# Patient Record
Sex: FEMALE | Race: WHITE | NOT HISPANIC OR LATINO | Employment: STUDENT | URBAN - METROPOLITAN AREA
[De-identification: names, ages, dates, MRNs, and addresses within clinical notes are randomized per-mention and may not be internally consistent; named-entity substitution may affect disease eponyms.]

---
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
## 2018-01-03 ENCOUNTER — OFFICE VISIT (OUTPATIENT)
Dept: GASTROENTEROLOGY | Facility: CLINIC | Age: 18
End: 2018-01-03
Payer: COMMERCIAL

## 2018-01-03 VITALS
SYSTOLIC BLOOD PRESSURE: 126 MMHG | DIASTOLIC BLOOD PRESSURE: 85 MMHG | HEART RATE: 73 BPM | WEIGHT: 157.63 LBS | BODY MASS INDEX: 26.26 KG/M2 | HEIGHT: 65 IN

## 2018-01-03 DIAGNOSIS — K52.9 PROCTOCOLITIS WITHOUT COMPLICATION: Primary | ICD-10-CM

## 2018-01-03 PROCEDURE — 99214 OFFICE O/P EST MOD 30 MIN: CPT | Mod: S$GLB,,, | Performed by: INTERNAL MEDICINE

## 2018-01-03 PROCEDURE — 99999 PR PBB SHADOW E&M-EST. PATIENT-LVL III: CPT | Mod: PBBFAC,,, | Performed by: INTERNAL MEDICINE

## 2018-01-03 NOTE — PATIENT INSTRUCTIONS
Ulcerative Colitis  You have been diagnosed with ulcerative colitis. Ulcerative colitis is a chronic condition that causes inflammation and ulcers in the rectum and colon. It is a form of inflammatory bowel disease (IBD). The disease is usually diagnosed by a special procedure called a colonoscopy. The symptoms usually develop over time. There is no medicine that can cure ulcerative colitis. The goal of treatment is to reduce the symptoms, and cause a remission.  Symptoms of ulcerative colitis include:  · Abdominal cramps and pain  · Diarrhea, usually bloody  · Rectal bleeding  · Rectal pain  · Fever  · Decreased appetite and weight loss  · Low energy  · Inflammation outside of the colon can occur and can cause pain or swelling in places like the eyes, skin, and joints  Home care  No one knows what exactly causes IBD. The goal is to control and relieve the symptoms, and prevent complications, so you can lead a full and active life. No medicine can cure the disease, but in some cases, surgery to remove the whole colon can be curative. However, surgery causes other side effects and so medicines are often preferred. Discuss your options with your healthcare provider.  Diet  Your diet did not cause your condition, but it can affect it. Unfortunately, no one diet that works for everyone, so you have to experiment. Below are some recommendations, but what works for you may be different. Keep a food log to figure out what you are sensitive to.  · Eat more slowly. Eat smaller amounts at a time, but more often. Remember, you can always eat more, but can't eat less once you've eaten too much.  · High-fiber foods are complicated. While they may help constipation, they can make bloating, cramping, gas, and diarrhea worse.  · Eat less sugar.  · Try avoiding dairy products if you feel you are sensitive to lactose.  · Try cutting out foods that are high in fat and fatty meats.  · You can control bloating and passing excess gas.  "Be careful with "gassy" vegetables and fruits like beans, cabbage, broccoli, and cauliflower.  · Be careful of carbonated beverages and fruit juices. They can make bloating and diarrhea worse.  · Caffeine, alcohol, and stimulants may make symptoms worse.  Lifestyle  Although stress doesn't cause IBD, it is a factor in flare-ups, and how you feel and react to your condition.  · Look for things that seem to make your symptoms worse, such as stress and emotions.  · Counseling can help you deal with stress. So can self-help measure like exercise, yoga, and meditation.  · Depression can be a part of this illness and antidepressant medicine may be prescribed. This may actually help with diarrhea, constipation, and cramping, as well as symptoms of depression.  · Smoking can make symptoms worse.  · Lack of sleep can make symptoms seem worse.  · Alcohol use can make symptoms worse.  Medicines  Your healthcare provider may prescribe medicines. Take them as directed. In most situations, lifelong medicine is necessary. For acute flares, additional prescription medicines can be prescribed. Call your provider if you need these.  · Ask your healthcare provider before taking any medicines for diarrhea.  · Avoid anti-inflammatory medicines like ibuprofen or naproxen.  · Consider nutritional supplements. This is especially true if the diarrhea is prolonged, or you aren't eating or are losing weight.  Follow-up care  Follow up with your healthcare provider, or as advised. Tell your provider if you lose more than 5 pounds over 3 to 6 months, and you aren't trying to lose weight.  If a stool sample was taken, or cultures were done, you will be told if they are positive, or if your treatment needs to be changed. You can call as directed for results.  If X-rays were done, a radiologist will look at them. You will be told if you need a change in treatment  It is very important to tell your doctor if you intend to get pregnant, or find out " you are pregnant. You will need to discuss your disease, medicines, and plan as early as possible and preferably before you conceive.  Call 911  Call 911 if any of these occur:  · Trouble breathing  · Confusion  · Very drowsy or trouble awakening  · Fainting or loss of consciousness  · Rapid heart rate  · Chest pain  When to seek medical advice  Call your healthcare provider right away if any of these occur:  · Bleeding from your rectum  · Frequent diarrhea or abdominal pain that's not controlled by your medicine  · Bloody diarrhea  · Fever of 100.4ºF (38ºC) or higher, or as directed by your health care provider  · Persistent nausea or repeated vomiting   Date Last Reviewed: 12/30/2015  © 1137-3492 [employer]. [address] [place] [address], [place] [address]. All rights reserved. This information is not intended as a substitute for professional medical care. Always follow your healthcare professional's instructions.

## 2018-01-03 NOTE — PROGRESS NOTES
Subjective:      Patient ID: Elvia Carr is a 17 y.o. female.    Chief Complaint: Ulcerative Colitis    HPI:   17-year-old  returns for GI follow-up.   Prior history of distal ulcerative colitis in April 2017.  Was initially treated with Rowasa in a preserved.  Symptoms have resolved completely.  Is now taking a presumptive only.  I advised her she could take Rowasa on an as needed basis if she has flares.  She has no digestive complaints.  Back in May she had follow-up labs drawn through [facility].  Doesn't do not appear to be available in Epic at this time.  However just prior to her diagnosis CBC was normal.  Advised her that we will provide refills on an as-needed basis.  She is currently in graduate school in [place].        Review of patient's allergies indicates:  No Known Allergies  Past Medical History:   Diagnosis Date    Asthma     Systemic lupus erythematosus     UC (ulcerative colitis) 04/2017     Past Surgical History:   Procedure Laterality Date    COLONOSCOPY N/A 4/17/2017    Procedure: COLONOSCOPY;  Surgeon: Drake Arnold MD;  Location: [facility];  Service: Endoscopy;  Laterality: N/A;    NONE       Family History   Problem Relation Age of Onset    Osteoarthritis Mother     Migraines Mother     Heart failure Maternal Grandfather     Breast cancer Neg Hx     Colon cancer Neg Hx     Ovarian cancer Neg Hx      Social History     Social History    Marital status: Single     Spouse name: N/A    Number of children: 0    Years of education: N/A     Occupational History    Not on file.     Social History Main Topics    Smoking status: Never Smoker    Smokeless tobacco: Never Used    Alcohol use Yes    Drug use: No    Sexual activity: Yes     Partners: Male     Birth control/ protection: None, Implant     Other Topics Concern    Not on file     Social History Narrative    Exercises regularly.  Plays basketball/volleyball.      Diet is fair/good.      Satisfied  "with weight.    She does drink at least 1/2 gallon water daily.    She drinks 0 coffee/tea/caffeine-containing soft drinks daily.    Total sleep time at night is 7 hours.    She does wear seat belts.    Hobbies include none.           Review of Systems:  Constitutional: Negative for appetite change.   HENT: Negative for trouble swallowing.   Eyes: Negative for photophobia.   Respiratory: Negative for cough and shortness of breath.   Cardiovascular: Negative for palpitations.   Gastrointestinal: See HPI for details.  Genitourinary: Negative for frequency and hematuria.   Skin: Negative for rash.   Neurological: Negative for weakness and headaches.   Hematological: Negative.   Psychiatric/Behavioral: Negative for suicidal ideas and behavioral problems.     Objective:     /85 (BP Location: Right arm, Patient Position: Sitting)   Pulse 73   Ht 5' 5" (1.651 m)   Wt 71.5 kg (157 lb 10.1 oz)   BMI 26.23 kg/m²     Physical Exam:  Eyes: Pupils are equal, round, and reactive to light.   Neck: Supple. No mass  Cardiovascular: Regular rhythm . No murmur   Pulmonary/Chest: Lungs clear   Abdominal: Soft. No mass palpated. Nontender, no guarding. Positive bowel sounds   Musculoskeletal: No deformity. No edema.   Psychiatric: Alert and oriented    Assessment:     1. Proctocolitis without complication      Plan:     Elvia was seen today for ulcerative colitis.    Diagnoses and all orders for this visit:    Proctocolitis without complication      Plan:  Continue current medications  Routine follow-up    "

## 2018-03-15 ENCOUNTER — TELEPHONE (OUTPATIENT)
Dept: GASTROENTEROLOGY | Facility: CLINIC | Age: 18
End: 2018-03-15

## 2018-03-15 RX ORDER — MESALAMINE 1.2 G/1
2.4 TABLET, DELAYED RELEASE ORAL ONCE
Qty: 60 TABLET | Refills: 6 | Status: SHIPPED | OUTPATIENT
Start: 2018-03-15 | End: 2018-03-15

## 2018-03-15 NOTE — TELEPHONE ENCOUNTER
We can try Lialda and I have sent this in.  I also have a rx card for apriso if she wants to try that first.  We could mail or she could .     I will send the Lialda and she can see which was is most affordable. ( I do not have any cards for the lialda)

## 2018-03-15 NOTE — TELEPHONE ENCOUNTER
----- Message from Kristine Roblero MA sent at 3/15/2018  3:24 PM CDT -----  Contact: [phone]/self  Can you please call in a different medicine for this patient? Dr. Choi is out until Monday. Thanks  ----- Message -----  From: Claribel Napier  Sent: 3/15/2018   3:19 PM  To: Steph Guy Staff ([place])    Patient states her insurance no longer covers APRISO 0.375 gram Cp24 so she needs an alternative. [address] Please advise.

## 2018-03-15 NOTE — TELEPHONE ENCOUNTER
----- Message from Claribel Napier sent at 3/15/2018  3:19 PM CDT -----  Contact: [phone]/self  Patient states her insurance no longer covers APRISO 0.375 gram Cp24 so she needs an alternative. [address] Please advise.

## 2018-03-15 NOTE — TELEPHONE ENCOUNTER
Spoke with patient in regards to NP message. Patient states that she will come by the office tomorrow to  and savings card.

## 2018-03-19 RX ORDER — ALBUTEROL SULFATE 90 UG/1
2 AEROSOL, METERED RESPIRATORY (INHALATION) EVERY 6 HOURS PRN
Qty: 36 G | Refills: 5 | Status: SHIPPED | OUTPATIENT
Start: 2018-03-19 | End: 2021-04-20

## 2018-06-08 ENCOUNTER — PATIENT MESSAGE (OUTPATIENT)
Dept: OBSTETRICS AND GYNECOLOGY | Facility: CLINIC | Age: 18
End: 2018-06-08

## 2018-06-08 ENCOUNTER — PATIENT MESSAGE (OUTPATIENT)
Dept: FAMILY MEDICINE | Facility: CLINIC | Age: 18
End: 2018-06-08

## 2018-06-15 ENCOUNTER — PATIENT MESSAGE (OUTPATIENT)
Dept: FAMILY MEDICINE | Facility: CLINIC | Age: 18
End: 2018-06-15

## 2018-06-15 ENCOUNTER — LAB VISIT (OUTPATIENT)
Dept: LAB | Facility: HOSPITAL | Age: 18
End: 2018-06-15
Attending: FAMILY MEDICINE
Payer: COMMERCIAL

## 2018-06-15 ENCOUNTER — OFFICE VISIT (OUTPATIENT)
Dept: FAMILY MEDICINE | Facility: CLINIC | Age: 18
End: 2018-06-15
Attending: FAMILY MEDICINE
Payer: COMMERCIAL

## 2018-06-15 VITALS
BODY MASS INDEX: 27.51 KG/M2 | SYSTOLIC BLOOD PRESSURE: 110 MMHG | HEIGHT: 65 IN | DIASTOLIC BLOOD PRESSURE: 80 MMHG | WEIGHT: 165.13 LBS | OXYGEN SATURATION: 97 % | HEART RATE: 80 BPM

## 2018-06-15 DIAGNOSIS — R63.5 WEIGHT GAIN, ABNORMAL: Primary | ICD-10-CM

## 2018-06-15 DIAGNOSIS — R63.5 WEIGHT GAIN, ABNORMAL: ICD-10-CM

## 2018-06-15 DIAGNOSIS — Z30.017 INSERTION OF IMPLANTABLE SUBDERMAL CONTRACEPTIVE: ICD-10-CM

## 2018-06-15 LAB
ALBUMIN SERPL BCP-MCNC: 4.3 G/DL
ALP SERPL-CCNC: 45 U/L
ALT SERPL W/O P-5'-P-CCNC: 17 U/L
ANION GAP SERPL CALC-SCNC: 10 MMOL/L
AST SERPL-CCNC: 21 U/L
BASOPHILS # BLD AUTO: 0.05 K/UL
BASOPHILS NFR BLD: 0.7 %
BILIRUB SERPL-MCNC: 0.6 MG/DL
BUN SERPL-MCNC: 6 MG/DL
CALCIUM SERPL-MCNC: 9.7 MG/DL
CHLORIDE SERPL-SCNC: 110 MMOL/L
CHOLEST SERPL-MCNC: 132 MG/DL
CHOLEST/HDLC SERPL: 2.7 {RATIO}
CO2 SERPL-SCNC: 21 MMOL/L
CREAT SERPL-MCNC: 0.7 MG/DL
DIFFERENTIAL METHOD: ABNORMAL
EOSINOPHIL # BLD AUTO: 0.7 K/UL
EOSINOPHIL NFR BLD: 9.2 %
ERYTHROCYTE [DISTWIDTH] IN BLOOD BY AUTOMATED COUNT: 12.1 %
EST. GFR  (AFRICAN AMERICAN): >60 ML/MIN/1.73 M^2
EST. GFR  (NON AFRICAN AMERICAN): >60 ML/MIN/1.73 M^2
GLUCOSE SERPL-MCNC: 91 MG/DL
HCT VFR BLD AUTO: 39.9 %
HDLC SERPL-MCNC: 49 MG/DL
HDLC SERPL: 37.1 %
HGB BLD-MCNC: 13.3 G/DL
IMM GRANULOCYTES # BLD AUTO: 0.02 K/UL
IMM GRANULOCYTES NFR BLD AUTO: 0.3 %
INSULIN COLLECTION INTERVAL: NORMAL
INSULIN SERPL-ACNC: 13.2 UU/ML
LDLC SERPL CALC-MCNC: 71.2 MG/DL
LYMPHOCYTES # BLD AUTO: 2.6 K/UL
LYMPHOCYTES NFR BLD: 36.3 %
MCH RBC QN AUTO: 29.4 PG
MCHC RBC AUTO-ENTMCNC: 33.3 G/DL
MCV RBC AUTO: 88 FL
MONOCYTES # BLD AUTO: 0.4 K/UL
MONOCYTES NFR BLD: 6.1 %
NEUTROPHILS # BLD AUTO: 3.4 K/UL
NEUTROPHILS NFR BLD: 47.4 %
NONHDLC SERPL-MCNC: 83 MG/DL
NRBC BLD-RTO: 0 /100 WBC
PLATELET # BLD AUTO: 236 K/UL
PMV BLD AUTO: 9.9 FL
POTASSIUM SERPL-SCNC: 4.5 MMOL/L
PROT SERPL-MCNC: 7 G/DL
RBC # BLD AUTO: 4.52 M/UL
SODIUM SERPL-SCNC: 141 MMOL/L
T4 FREE SERPL-MCNC: 0.87 NG/DL
TRIGL SERPL-MCNC: 59 MG/DL
TSH SERPL DL<=0.005 MIU/L-ACNC: 1.54 UIU/ML
WBC # BLD AUTO: 7.17 K/UL

## 2018-06-15 PROCEDURE — 84439 ASSAY OF FREE THYROXINE: CPT

## 2018-06-15 PROCEDURE — 84443 ASSAY THYROID STIM HORMONE: CPT

## 2018-06-15 PROCEDURE — 83525 ASSAY OF INSULIN: CPT

## 2018-06-15 PROCEDURE — 80061 LIPID PANEL: CPT

## 2018-06-15 PROCEDURE — 80053 COMPREHEN METABOLIC PANEL: CPT

## 2018-06-15 PROCEDURE — 99214 OFFICE O/P EST MOD 30 MIN: CPT | Mod: S$GLB,,, | Performed by: FAMILY MEDICINE

## 2018-06-15 PROCEDURE — 3008F BODY MASS INDEX DOCD: CPT | Mod: CPTII,S$GLB,, | Performed by: FAMILY MEDICINE

## 2018-06-15 PROCEDURE — 85025 COMPLETE CBC W/AUTO DIFF WBC: CPT

## 2018-06-15 PROCEDURE — 36415 COLL VENOUS BLD VENIPUNCTURE: CPT | Mod: PO

## 2018-06-15 PROCEDURE — 99999 PR PBB SHADOW E&M-EST. PATIENT-LVL III: CPT | Mod: PBBFAC,,, | Performed by: FAMILY MEDICINE

## 2018-06-15 NOTE — PROGRESS NOTES
"Subjective:       Patient ID: Elvia Carr is a 17 y.o. female.    Chief Complaint: Follow-up (thyroid testing )    HPI    The patient presents with concern about ~ 25# weight gain over the past 2 years.  This coincides with beginning contraceptive use with Nexplanon.  She is considering discontinuing.     She exercises 3-5 times weekly; her diet is "pretty good."  She is asking for a "thyroid check."      Patient Active Problem List   Diagnosis    Insertion of implantable subdermal contraceptive    Weight gain, abnormal    Ulcerative proctitis with rectal bleeding    Proctocolitis without complication       Current Outpatient Prescriptions:     albuterol (PROAIR HFA) 90 mcg/actuation inhaler, Inhale 2 puffs into the lungs every 6 (six) hours as needed for Wheezing., Disp: 36 g, Rfl: 5    APRISO 0.375 gram Cp24, TK 4 CS PO ONCE D, Disp: 120 capsule, Rfl: 6    citalopram (CELEXA) 40 MG tablet, Take 1 tablet (40 mg total) by mouth once daily., Disp: 30 tablet, Rfl: 5    estradiol (ESTRACE) 2 MG tablet, Take 1 tablet (2 mg total) by mouth once daily., Disp: 30 tablet, Rfl: 2    etonogestrel (NEXPLANON) 68 mg Impl, by Subdermal route., Disp: , Rfl:     multivitamin capsule, Take 1 capsule by mouth once daily., Disp: , Rfl:     The following portions of the patient's history were reviewed and updated as appropriate: allergies, past family history, past medical history, past social history and past surgical history    Review of Systems   Constitutional: Positive for unexpected weight change. Negative for activity change.   HENT: Negative for hearing loss, rhinorrhea and trouble swallowing.    Eyes: Negative for discharge and visual disturbance.   Respiratory: Negative for chest tightness and wheezing.    Cardiovascular: Negative for chest pain and palpitations.   Gastrointestinal: Negative for blood in stool, constipation, diarrhea and vomiting.   Endocrine: Negative for polydipsia and polyuria. "   Genitourinary: Positive for menstrual problem. Negative for difficulty urinating, dysuria and hematuria.   Musculoskeletal: Positive for arthralgias. Negative for joint swelling and neck pain.   Neurological: Negative for weakness and headaches.   Psychiatric/Behavioral: Positive for dysphoric mood. Negative for confusion.       Objective:      Physical Exam   Constitutional: She is oriented to person, place, and time. She appears well-developed and well-nourished. She is cooperative.   HENT:   Head: Normocephalic and atraumatic.   Nose: Nose normal.   Mouth/Throat: Oropharynx is clear and moist and mucous membranes are normal.   Eyes: Conjunctivae are normal. No scleral icterus.   Neck: Neck supple. No JVD present. Carotid bruit is not present. No thyromegaly present.   Cardiovascular: Normal rate, regular rhythm, normal heart sounds and normal pulses.  Exam reveals no gallop and no friction rub.    No murmur heard.  Pulmonary/Chest: Effort normal and breath sounds normal. She has no wheezes. She has no rhonchi. She has no rales.   Abdominal: Soft. Bowel sounds are normal. She exhibits no distension and no mass. There is no splenomegaly or hepatomegaly. There is no tenderness.   Musculoskeletal: Normal range of motion. She exhibits no edema or tenderness.   Lymphadenopathy:     She has no cervical adenopathy.     She has no axillary adenopathy.   Neurological: She is alert and oriented to person, place, and time. She has normal strength and normal reflexes. No cranial nerve deficit or sensory deficit.   Skin: Skin is warm and dry.   Psychiatric: She has a normal mood and affect. Her speech is normal.   Vitals reviewed.        Assessment:       1. Weight gain, abnormal    2. Insertion of implantable subdermal contraceptive          Plan:       Labs (see Orders).  Reassured patient.  Continue vegetarian diet and exercise regimen.  We will call the patient with results & make further recommendations at that  "time.      "This note will not be shared with the patient."  "

## 2018-06-18 ENCOUNTER — PATIENT MESSAGE (OUTPATIENT)
Dept: FAMILY MEDICINE | Facility: CLINIC | Age: 18
End: 2018-06-18

## 2018-06-18 ENCOUNTER — OFFICE VISIT (OUTPATIENT)
Dept: OBSTETRICS AND GYNECOLOGY | Facility: CLINIC | Age: 18
End: 2018-06-18
Payer: COMMERCIAL

## 2018-06-18 VITALS — HEIGHT: 64 IN | WEIGHT: 165.38 LBS | BODY MASS INDEX: 28.24 KG/M2

## 2018-06-18 DIAGNOSIS — Z30.46 ENCOUNTER FOR NEXPLANON REMOVAL: Primary | ICD-10-CM

## 2018-06-18 PROCEDURE — 99999 PR PBB SHADOW E&M-EST. PATIENT-LVL II: CPT | Mod: PBBFAC,,, | Performed by: OBSTETRICS & GYNECOLOGY

## 2018-06-18 PROCEDURE — 99499 UNLISTED E&M SERVICE: CPT | Mod: S$GLB,,, | Performed by: OBSTETRICS & GYNECOLOGY

## 2018-06-18 PROCEDURE — 88300 SURGICAL PATH GROSS: CPT | Performed by: PATHOLOGY

## 2018-06-18 PROCEDURE — 88300 SURGICAL PATH GROSS: CPT | Mod: 26,,, | Performed by: PATHOLOGY

## 2018-06-18 PROCEDURE — 11982 REMOVE DRUG IMPLANT DEVICE: CPT | Mod: S$GLB,,, | Performed by: OBSTETRICS & GYNECOLOGY

## 2018-06-18 NOTE — PROGRESS NOTES
NEXPLANON REMOVAL:    Elvia Carr is a 17 y.o. female  presents for Nexplanon removal because is unappy with side effects. WILL RX FRANCES INSTEAD FOR GOOD CYCLE CONTROL   LAST VISIT 10/2017:  TOO EARLY PAP.  HAS HAD BTB WITH NEXPLANON EVERY 8 DAYS LASTING 5 DAYS OVER THE PAST 2 MONTHS COINCIDENT WITH STARTING COLLEGE AT [employer], PRE-VET, AND ++ STRESS. PRIOR TO THIS HAS HAD MINIMAL BLEEDING AND CRAMPING HAS RESOLVED WITH NEXPLANON UP TO THIS POINT; UC WAS DIAGNOSED IN April, BUT HAS NOT HAD SIGNIFICANT INCREASE IN GI SYMPTOMS.   TEMPORARY INCREASE IN CELEXA DOSAGE OVER THE NEXT 5-6 MONTHS AND ADVISED PT TO DISCUSS WITH DR ALLEN PCP.  DISCUSSED STRESS MANAGEMENT TECHNIQUES, AND WILL RX ESTRADIOL 2 MG DAILY IN ADDITION TO HER NEXPLANON IN ATTEMPT TO CONTROL SX.    LAST VISIT :  OTHER THAN BTB - WANTS ANOTHER CONTRACEPTIVE, INTERESTED IN NEXPLANON.  MENSES JUST ENDED.  ALSO HAS CONTINUED RLQ PAIN, WORSE WITH MENSES - WILL F/U USG TO CHECK.  WANTS GARDASIL - ONLY HAD ONE OF PRIOR ORDERED  LAST VISIT 3/2015:  CONT RLQ PAIN, SHE THINKS IS ASSOCIATED WITH HER RIGHT OVARY, WANTS CHECKED - HAS HAPPENED MOST MONTHS OVER THE PAST YEAR, USUALLY WITH MENSES. ALSO INTERESTED IN OCP; WOULD LIKE STD SCREENFOR [facility] OF MIND  LAST VISIT 2014:  NEW PATIENT, NEEDS GARDASIL AND REF BY PEDS. MENARCHE AGE 9; MENSES LAST 5 DAYS, 3 ARE HEAVY. OCCAS RIGHT LQ PAIN WITH MENSES, BETTER WITH ADVIL, MIDOL - DISCUSSED STD SCREENING     PRE-NEXPLANON REMOVAL COUNSELING:  The patient was advised of minimal risks of bleeding and pain and she agrees to proceed.    EXAM:  Nexplanon palpable by palpation or imaging.  The end closest to the elbow was marked.    PROCEDURE:  A time out was performed.  The patient was placed in same position as for insertion.  The area was prepped with antiseptic.  1 cc of 1% Xylocaine with epinephrine was injected just underneath end of implant closest to the elbow.  Downward pressure was placed  on the end of the implant closest to the axilla.  Using sterile technique, a 2 mm incision was made in longitudinal direction of arm at tip of the implant closest to the elbow.  The implant was gently pushed toward the incision until tip was visible.  The implant was grasped with a sterile mosquito forceps and gently removed.  The encapsulated implant was gently removed from the capsule with sharp or blunt dissection.  Tip of implant was still not visible after gently pushing it towards incision, so sterile forceps were gently inserted into the incision and the implant was grasped and removed.  Forceps were used to flip the implant over and a second pair of sterile forceps were  used to localize the implant and dissect the fibrotic capsule and remove it.  The entire 4 cm implant was removed.  The incision site was closed with steri strips and a small adhesive bandage and then pressure bandage was placed over insertion site.    The patient tolerated the procedure well.    ASSESSMENT:  Contraceptive Management / Removal Nexplanon    POST IMPLANON REMOVAL COUNSELING:  Expect period-like flow to occur after Implanon removal and periods to return to pre-Implanon  pattern.  Manage post Nexplanon arm pain with Tylenol or Rx per MedCard.  Keep arm elevated, apply intermittent ice packs to decrease pain and bruising for 24 hrs.  May remove bandage in 24 hours.  Nexplanon removal danger signs (worsening pain at incision site, bleeding through  bandage, redness and/or pus drainage at incision site).    POST NEXPLANON REMOVAL CONTRACEPTION:    FOLLOW-UP: with me for annual gyn exam or prn.

## 2018-06-19 ENCOUNTER — PATIENT MESSAGE (OUTPATIENT)
Dept: OBSTETRICS AND GYNECOLOGY | Facility: CLINIC | Age: 18
End: 2018-06-19

## 2018-06-19 ENCOUNTER — TELEPHONE (OUTPATIENT)
Dept: OBSTETRICS AND GYNECOLOGY | Facility: CLINIC | Age: 18
End: 2018-06-19

## 2018-06-19 DIAGNOSIS — Z30.017 INSERTION OF IMPLANTABLE SUBDERMAL CONTRACEPTIVE: ICD-10-CM

## 2018-06-19 RX ORDER — DROSPIRENONE AND ETHINYL ESTRADIOL 0.03MG-3MG
1 KIT ORAL DAILY
Qty: 90 TABLET | Refills: 3 | Status: SHIPPED | OUTPATIENT
Start: 2018-06-19 | End: 2019-05-06

## 2018-06-19 NOTE — TELEPHONE ENCOUNTER
MESSAGE TO PATIENT:  I just sent a script for Jenifer to your pharmacy - it has a great side-effect profile, with mild, regular bleeding, good acne control, and few negative effects such as weight gain or breast tenderness.  Recent commercials allude to suits brought in regard to the risk of blood clots while taking Jenifer, but the best studies do not show risks that are greater than other similar pills.  Risks are small, with an extra blood clot in about 1/10,000 patients.  You can start the pills today, or the Sunday after your next period begins.  You won't be covered for birth control until you've taken a full month of pills, but as always, I recommend condoms to prevent STDs.  Please contact us if you have any questions; I've sent a prescription to your pharmacy on file.

## 2018-06-19 NOTE — TELEPHONE ENCOUNTER
Patient has changed her mind and would like some type of birth control called in .. Pharmacy has bee updated.

## 2019-01-03 ENCOUNTER — OFFICE VISIT (OUTPATIENT)
Dept: GASTROENTEROLOGY | Facility: CLINIC | Age: 19
End: 2019-01-03
Payer: COMMERCIAL

## 2019-01-03 ENCOUNTER — LAB VISIT (OUTPATIENT)
Dept: LAB | Facility: HOSPITAL | Age: 19
End: 2019-01-03
Attending: INTERNAL MEDICINE
Payer: COMMERCIAL

## 2019-01-03 VITALS
HEART RATE: 77 BPM | HEIGHT: 64 IN | DIASTOLIC BLOOD PRESSURE: 84 MMHG | WEIGHT: 147 LBS | BODY MASS INDEX: 25.1 KG/M2 | SYSTOLIC BLOOD PRESSURE: 122 MMHG

## 2019-01-03 DIAGNOSIS — R10.84 ABDOMINAL PAIN, GENERALIZED: ICD-10-CM

## 2019-01-03 DIAGNOSIS — K51.211 ULCERATIVE PROCTITIS WITH RECTAL BLEEDING: Chronic | ICD-10-CM

## 2019-01-03 DIAGNOSIS — K51.211 ULCERATIVE PROCTITIS WITH RECTAL BLEEDING: Primary | Chronic | ICD-10-CM

## 2019-01-03 PROBLEM — R63.5 WEIGHT GAIN, ABNORMAL: Status: RESOLVED | Noted: 2017-04-03 | Resolved: 2019-01-03

## 2019-01-03 PROBLEM — K52.9: Chronic | Status: RESOLVED | Noted: 2017-04-28 | Resolved: 2019-01-03

## 2019-01-03 LAB
ALBUMIN SERPL BCP-MCNC: 4.1 G/DL
ALP SERPL-CCNC: 41 U/L
ALT SERPL W/O P-5'-P-CCNC: 22 U/L
ANION GAP SERPL CALC-SCNC: 8 MMOL/L
AST SERPL-CCNC: 20 U/L
BASOPHILS # BLD AUTO: 0.02 K/UL
BASOPHILS NFR BLD: 0.2 %
BILIRUB SERPL-MCNC: 0.4 MG/DL
BUN SERPL-MCNC: 10 MG/DL
CALCIUM SERPL-MCNC: 9.6 MG/DL
CHLORIDE SERPL-SCNC: 106 MMOL/L
CO2 SERPL-SCNC: 24 MMOL/L
CREAT SERPL-MCNC: 0.7 MG/DL
CRP SERPL-MCNC: 1.1 MG/L
DIFFERENTIAL METHOD: NORMAL
EOSINOPHIL # BLD AUTO: 0.1 K/UL
EOSINOPHIL NFR BLD: 1 %
ERYTHROCYTE [DISTWIDTH] IN BLOOD BY AUTOMATED COUNT: 12 %
ERYTHROCYTE [SEDIMENTATION RATE] IN BLOOD BY WESTERGREN METHOD: 8 MM/HR
EST. GFR  (AFRICAN AMERICAN): >60 ML/MIN/1.73 M^2
EST. GFR  (NON AFRICAN AMERICAN): >60 ML/MIN/1.73 M^2
GLUCOSE SERPL-MCNC: 98 MG/DL
HCT VFR BLD AUTO: 39.9 %
HGB BLD-MCNC: 13.2 G/DL
LYMPHOCYTES # BLD AUTO: 2.7 K/UL
LYMPHOCYTES NFR BLD: 31.6 %
MCH RBC QN AUTO: 29.1 PG
MCHC RBC AUTO-ENTMCNC: 33.1 G/DL
MCV RBC AUTO: 88 FL
MONOCYTES # BLD AUTO: 0.6 K/UL
MONOCYTES NFR BLD: 6.5 %
NEUTROPHILS # BLD AUTO: 5.3 K/UL
NEUTROPHILS NFR BLD: 60.6 %
PLATELET # BLD AUTO: 246 K/UL
PMV BLD AUTO: 9.6 FL
POTASSIUM SERPL-SCNC: 4 MMOL/L
PROT SERPL-MCNC: 7.5 G/DL
RBC # BLD AUTO: 4.54 M/UL
SODIUM SERPL-SCNC: 138 MMOL/L
WBC # BLD AUTO: 8.68 K/UL

## 2019-01-03 PROCEDURE — 86706 HEP B SURFACE ANTIBODY: CPT

## 2019-01-03 PROCEDURE — 99999 PR PBB SHADOW E&M-EST. PATIENT-LVL III: CPT | Mod: PBBFAC,,, | Performed by: INTERNAL MEDICINE

## 2019-01-03 PROCEDURE — 85652 RBC SED RATE AUTOMATED: CPT

## 2019-01-03 PROCEDURE — 86140 C-REACTIVE PROTEIN: CPT

## 2019-01-03 PROCEDURE — 80053 COMPREHEN METABOLIC PANEL: CPT

## 2019-01-03 PROCEDURE — 87340 HEPATITIS B SURFACE AG IA: CPT

## 2019-01-03 PROCEDURE — 99999 PR PBB SHADOW E&M-EST. PATIENT-LVL III: ICD-10-PCS | Mod: PBBFAC,,, | Performed by: INTERNAL MEDICINE

## 2019-01-03 PROCEDURE — 85025 COMPLETE CBC W/AUTO DIFF WBC: CPT

## 2019-01-03 PROCEDURE — 99214 PR OFFICE/OUTPT VISIT, EST, LEVL IV, 30-39 MIN: ICD-10-PCS | Mod: S$GLB,,, | Performed by: INTERNAL MEDICINE

## 2019-01-03 PROCEDURE — 3008F PR BODY MASS INDEX (BMI) DOCUMENTED: ICD-10-PCS | Mod: CPTII,S$GLB,, | Performed by: INTERNAL MEDICINE

## 2019-01-03 PROCEDURE — 86704 HEP B CORE ANTIBODY TOTAL: CPT

## 2019-01-03 PROCEDURE — 99214 OFFICE O/P EST MOD 30 MIN: CPT | Mod: S$GLB,,, | Performed by: INTERNAL MEDICINE

## 2019-01-03 PROCEDURE — 3008F BODY MASS INDEX DOCD: CPT | Mod: CPTII,S$GLB,, | Performed by: INTERNAL MEDICINE

## 2019-01-03 RX ORDER — POLYETHYLENE GLYCOL 3350, SODIUM SULFATE ANHYDROUS, SODIUM BICARBONATE, SODIUM CHLORIDE, POTASSIUM CHLORIDE 236; 22.74; 6.74; 5.86; 2.97 G/4L; G/4L; G/4L; G/4L; G/4L
POWDER, FOR SOLUTION ORAL ONCE
Qty: 4000 ML | Refills: 0 | Status: SHIPPED | OUTPATIENT
Start: 2019-01-03 | End: 2019-01-04

## 2019-01-03 NOTE — PROGRESS NOTES
"Subjective:       Patient ID: Elvia Carr is a 18 y.o. female.    Chief Complaint: Abdominal Pain and Change in bowel habits    19 yo F dx with UC by Dr. Arnold 4/2017 here for f/u.  She has been taking Apriso as directed since diagnosis.  She states that she was doing well until 9/2018 when she began to again have cramping abdominal pain, fecal urgency, mucoid stools consisting of 3-4 small BM per day.  She denies nocturnal symptoms or fever.  She states she has lost 20# since July, but she has been trying to lose weight.  She is transferring from a college in [place] to [employer] which starts 1/9/18.  She had symptoms for 6 months prior to diagnosis.  She has not had meds other than Apriso.      Review of Systems   Constitutional: Negative for appetite change.   Respiratory: Negative for chest tightness, shortness of breath and wheezing.    Cardiovascular: Negative for chest pain, palpitations and leg swelling.       Objective:       /84 (BP Location: Right arm, Patient Position: Sitting)   Pulse 77   Ht 5' 4" (1.626 m)   Wt 66.7 kg (147 lb)   BMI 25.23 kg/m²     Physical Exam   Constitutional: She appears well-developed and well-nourished.   Abdominal: Soft. Bowel sounds are normal. She exhibits no distension. There is no tenderness. There is no guarding.       Assessment:       1. Ulcerative proctitis with rectal bleeding    2. Abdominal pain, generalized        Plan:       Ulcerative proctitis with rectal bleeding; Abdominal pain, generalized  -     Calprotectin; Future; Expected date: 01/03/2019  -     CBC auto differential; Future; Expected date: 01/03/2019  -     Comprehensive metabolic panel; Future; Expected date: 01/03/2019  -     C-reactive protein; Future; Expected date: 01/03/2019  -     Sedimentation rate; Future; Expected date: 01/03/2019  -     Quantiferon Gold TB; Future; Expected date: 01/03/2019  -     Hepatitis B surface antigen; Future; Expected date: 01/03/2019  -     " Hepatitis B core antibody, total; Future; Expected date: 01/03/2019  -     Hepatitis B surface antibody; Future; Expected date: 01/03/2019  -     Case request GI: COLONOSCOPY  -     sod picosulf-mag ox-citric ac (PREPOPIK) 10 mg-3.5 gram-12 gram PwPk; Take 1 Package by mouth once. Use as directed on prep instructions given to you from the office for 1 dose  Dispense: 2 each; Refill: 0  -     Clostridium difficile EIA; Future; Expected date: 01/03/2019  -     Continue Apriso for now  -     Will plan for colonoscopy when she is out of school on MLK day, but I will also get her the name and contact information for [facility] in  as well since she will be in school there.

## 2019-01-03 NOTE — PATIENT INSTRUCTIONS
PREPOPIK Instructions    You are scheduled for a colonoscopy with Dr. Cook on Monday, January 21 at [facility] located at [address] [place] [address].  Check in at the admit desk, first floor of the hospital (which is the building on the left).     You will receive a call 2-3 days before your colonoscopy to tell you the time to arrive.  If you have not received a call by the day before your procedure, call the Endoscopy Lab at [phone].    To ensure that your test is accurate and complete, you MUST follow these instructions listed below.  If you have any questions, please call our office at [phone].  Plan on being at the hospital for your procedure for 3-4 hours.    1.  Follow a CLEAR LIQUID DIET for the entire day before your scheduled colonoscopy.  This means no solid food the entire day starting when you wake.  You may have as much of the clear liquids as you want throughout the day.   CLEAR LIQUID DIET:   - Avoid Red, Orange, Purple, and/or Blue food coloring   - NO DAIRY   - You can have:  Coffee with sugar (no creamer), tea, water, soda, apple or white grape juice, chicken or beef broth/bouillon (no meat, noodles, or veggies), green/yellow popsicles, green/yellow Jell-O, lemonade.    2.  AT 5 pm the evening before your colonoscopy, FILL THE DOSING CUP (provided inside the Prepopik box) WITH COOL WATER TO THE LOWER LINE. POUR PACKET #1 INTO CONTAINER AND STIR FOR 3 MINUTES TO MIX WELL. (it is normal for the cup to feel warm as the medicine dissolves). DRINK THE ENTIRE MIXTURE. THEN DRINK FIVE (5) DOSING CUPS OF WATER TO THE UPPER LINE OVER THE NEXT FIVE (5) HOURS.     3.  The endoscopy department will call you 2 days before your colonoscopy to tell you the exact time to arrive, AND to tell you the exact time to drink the 2nd portion of your prep (which will be FIVE HOURS BEFORE YOUR ARRIVAL TIME).  At this time given to you, FILL THE DOSING CUP (provided inside the Prepopik box) WITH COOL  WATER TO THE LOWER LINE. POUR PACKET #1 INTO CONTAINER AND STIR FOR 3 MINUTES TO MIX WELL. (it is normal for the cup to feel warm as the medicine dissolves). DRINK THE ENTIRE MIXTURE. THEN DRINK THREE (3) DOSING CUPS OF WATER TO THE UPPER LINE OVER THE NEXT ONE (1) HOUR. Once this is complete, you may not have ANYTHING else by mouth!    4.  You must have someone with you to DRIVE YOU HOME since you will be receiving IV sedation for the colonoscopy.    5.  It is ok to take your heart, blood pressure, and seizure medications in the morning of your test with a SIP of water.  Hold other medications until after your procedure.  Do NOT have anything else to eat or drink the morning of your colonoscopy.  It is ok to brush your teeth.    6.  If you are on blood thinners THAT YOU HAVE BEEN INSTRUCTED TO HOLD BY YOUR DOCTOR FOR THIS PROCEDURE, then do NOT take this the morning of your colonoscopy.  Do NOT stop these medications on your own, they must be approved to be held by your doctor.  Your colonoscopy can NOT be done if you are on these medications.  Examples of blood thinners include: Coumadin, Aggrenox, Plavix, Pradaxa, Reapro, Pletal, Xarelto, Ticagrelor, Brilinta, Eliquis, and high dose aspirin (325 mg).  You do not have to stop baby aspirin 81 mg.    7.  IF YOU ARE DIABETIC:  NO INSULIN OR ORAL MEDICATIONS THE MORNING OF THE COLONOSCOPY.  TAKE ONLY HALF THE DOSE OF YOUR INSULIN THE DAY BEFORE THE COLONOSCOPY.  DO NOT TAKE ANY ORAL DIABETIC MEDICATIONS THE DAY BEFORE THE COLONOSCOPY.  IF YOU ARE AN INSULIN DEPENDENT DIABETIC WITH UNSTABLE BLOOD SUGARDS, NOTIFY YOUR PRIMARY CARE PHYSICIAN FOR INSTRUCTIONS.

## 2019-01-04 ENCOUNTER — LAB VISIT (OUTPATIENT)
Dept: LAB | Facility: HOSPITAL | Age: 19
End: 2019-01-04
Attending: INTERNAL MEDICINE
Payer: COMMERCIAL

## 2019-01-04 DIAGNOSIS — K51.211 ULCERATIVE PROCTITIS WITH RECTAL BLEEDING: Chronic | ICD-10-CM

## 2019-01-04 LAB
C DIFF GDH STL QL: NEGATIVE
C DIFF TOX A+B STL QL IA: NEGATIVE
HBV CORE AB SERPL QL IA: NEGATIVE
HBV SURFACE AB SER-ACNC: NEGATIVE M[IU]/ML
HBV SURFACE AG SERPL QL IA: NEGATIVE

## 2019-01-04 PROCEDURE — 87449 NOS EACH ORGANISM AG IA: CPT

## 2019-01-04 PROCEDURE — 83993 ASSAY FOR CALPROTECTIN FECAL: CPT

## 2019-01-10 LAB — CALPROTECTIN STL-MCNT: <15.6 MCG/G

## 2019-01-17 ENCOUNTER — TELEPHONE (OUTPATIENT)
Dept: ENDOSCOPY | Facility: HOSPITAL | Age: 19
End: 2019-01-17

## 2019-01-17 NOTE — TELEPHONE ENCOUNTER
Left message instructing patient to call dept @ [phone] between 8am-4pm.    Arrival time  @1330  (2nd portion of prep @)0730  (NPO @ 0830)

## 2019-01-18 ENCOUNTER — PATIENT MESSAGE (OUTPATIENT)
Dept: GASTROENTEROLOGY | Facility: CLINIC | Age: 19
End: 2019-01-18

## 2019-01-21 ENCOUNTER — PATIENT MESSAGE (OUTPATIENT)
Dept: GASTROENTEROLOGY | Facility: CLINIC | Age: 19
End: 2019-01-21

## 2019-01-25 ENCOUNTER — OFFICE VISIT (OUTPATIENT)
Dept: GASTROENTEROLOGY | Facility: CLINIC | Age: 19
End: 2019-01-25
Payer: COMMERCIAL

## 2019-01-25 VITALS
HEART RATE: 82 BPM | SYSTOLIC BLOOD PRESSURE: 114 MMHG | WEIGHT: 146.38 LBS | DIASTOLIC BLOOD PRESSURE: 70 MMHG | HEIGHT: 64 IN | BODY MASS INDEX: 24.99 KG/M2

## 2019-01-25 DIAGNOSIS — K51.00 ULCERATIVE PANCOLITIS WITHOUT COMPLICATION: Primary | ICD-10-CM

## 2019-01-25 PROCEDURE — 99999 PR PBB SHADOW E&M-EST. PATIENT-LVL III: CPT | Mod: PBBFAC,,, | Performed by: NURSE PRACTITIONER

## 2019-01-25 PROCEDURE — 99999 PR PBB SHADOW E&M-EST. PATIENT-LVL III: ICD-10-PCS | Mod: PBBFAC,,, | Performed by: NURSE PRACTITIONER

## 2019-01-25 PROCEDURE — 99213 PR OFFICE/OUTPT VISIT, EST, LEVL III, 20-29 MIN: ICD-10-PCS | Mod: S$GLB,,, | Performed by: NURSE PRACTITIONER

## 2019-01-25 PROCEDURE — 3008F PR BODY MASS INDEX (BMI) DOCUMENTED: ICD-10-PCS | Mod: CPTII,S$GLB,, | Performed by: NURSE PRACTITIONER

## 2019-01-25 PROCEDURE — 99213 OFFICE O/P EST LOW 20 MIN: CPT | Mod: S$GLB,,, | Performed by: NURSE PRACTITIONER

## 2019-01-25 PROCEDURE — 3008F BODY MASS INDEX DOCD: CPT | Mod: CPTII,S$GLB,, | Performed by: NURSE PRACTITIONER

## 2019-01-27 NOTE — PROGRESS NOTES
Clinic Consult:  [facility] Consultation Note    Reason for Consult:  The encounter diagnosis was Ulcerative pancolitis without complication.    PCP: Jad Parnell Jr       HPI:  This is a 18 y.o. female here for discussion of above  Pt states that she was diagnosed with UC in 2017 and managed by Dr. Christensen in [place].  She has been on oral therapy without any significant improvement in her symptoms.    She continues with abdominal pain and diarrhea.    She was seen by Dr. Cook in early January and a colonoscopy was ordered however, pt is unable to complete in  due to school requirements.  She is requesting the colonoscopy be completed here in  but she wishes to continue her medical management with Dr. Cook.       Review of Systems   Constitutional: Negative for chills, fever, malaise/fatigue and weight loss.   Respiratory: Negative for cough.    Cardiovascular: Negative for chest pain.   Gastrointestinal:        Per HPI   Musculoskeletal: Negative for myalgias.   Skin: Negative for itching and rash.   Neurological: Negative for headaches.   Psychiatric/Behavioral: The patient is not nervous/anxious.        Medical History:   Past Medical History:   Diagnosis Date    Asthma     Systemic lupus erythematosus     UC (ulcerative colitis) 04/2017       Surgical History:  Past Surgical History:   Procedure Laterality Date    COLONOSCOPY N/A 4/17/2017    [facility]    INSERTION OF CONTRACEPTIVE CAPSULE  05/2016    NONE         Family History:   Family History   Problem Relation Age of Onset    Osteoarthritis Mother     Migraines Mother     Heart failure Maternal Grandfather     Breast cancer Neg Hx     Colon cancer Neg Hx     Ovarian cancer Neg Hx        Social History:   Social History     Tobacco Use    Smoking status: Never Smoker    Smokeless tobacco: Never Used   Substance Use Topics    Alcohol use: Yes     Alcohol/week: 0.6 - 1.2 oz  "    Types: 1 - 2 Shots of liquor per week    Drug use: No       Allergies: Reviewed    Home Medications:   Current Outpatient Medications on File Prior to Visit   Medication Sig Dispense Refill    albuterol (PROAIR HFA) 90 mcg/actuation inhaler Inhale 2 puffs into the lungs every 6 (six) hours as needed for Wheezing. 36 g 5    APRISO 0.375 gram Cp24 TK 4 CS PO ONCE D 120 capsule 6    drospirenone-ethinyl estradiol (FRANCES, 28,) 3-0.03 mg per tablet Take 1 tablet by mouth once daily. 90 tablet 3    multivitamin capsule Take 1 capsule by mouth once daily.       No current facility-administered medications on file prior to visit.        Physical Exam:  Vital Signs:  /70   Pulse 82   Ht 5' 4" (1.626 m)   Wt 66.4 kg (146 lb 6.2 oz)   BMI 25.13 kg/m²   Body mass index is 25.13 kg/m².  Physical Exam   Constitutional: She is oriented to person, place, and time. She appears well-developed and well-nourished.   Eyes: No scleral icterus.   Neck: Normal range of motion.   Cardiovascular: Normal rate and regular rhythm.   Pulmonary/Chest: Effort normal and breath sounds normal.   Abdominal: Soft. Bowel sounds are normal. She exhibits no distension. There is no tenderness.   Musculoskeletal: Normal range of motion.   Neurological: She is alert and oriented to person, place, and time.   Skin: Skin is warm and dry.   Psychiatric: She has a normal mood and affect.   Vitals reviewed.      Labs: Pertinent labs reviewed.    Assessment:  1. Ulcerative pancolitis without complication         Recommendations:  Ulcerative pancolitis without complication  - Will plan for colonoscopy with Golytely prep that she previously received.   - Further management to be completed in [place] by Dr. Cook  - pt to continue with current medications.  -     Case request GI: COLONOSCOPY          Follow up to be determined by results of procedure.        Thank you so much for allowing me to participate in the care of Elvia Gill" Lilly Jolley, FNP-C

## 2019-01-29 ENCOUNTER — TELEPHONE (OUTPATIENT)
Dept: GASTROENTEROLOGY | Facility: CLINIC | Age: 19
End: 2019-01-29

## 2019-01-29 ENCOUNTER — PATIENT MESSAGE (OUTPATIENT)
Dept: GASTROENTEROLOGY | Facility: CLINIC | Age: 19
End: 2019-01-29

## 2019-01-29 NOTE — TELEPHONE ENCOUNTER
Duplicate. Patient saw Roberta Jolley and will have colonoscopy in [place]. Please schedule a follow up with Dr. Paredes in March. Thanks.

## 2019-02-07 DIAGNOSIS — K51.30 ULCERATIVE RECTOSIGMOIDITIS WITHOUT COMPLICATION: ICD-10-CM

## 2019-02-07 RX ORDER — MESALAMINE 375 MG/1
CAPSULE, EXTENDED RELEASE ORAL
Qty: 120 CAPSULE | Refills: 0 | OUTPATIENT
Start: 2019-02-07

## 2019-05-06 ENCOUNTER — OFFICE VISIT (OUTPATIENT)
Dept: OBSTETRICS AND GYNECOLOGY | Facility: CLINIC | Age: 19
End: 2019-05-06
Payer: COMMERCIAL

## 2019-05-06 VITALS
SYSTOLIC BLOOD PRESSURE: 116 MMHG | WEIGHT: 144.81 LBS | DIASTOLIC BLOOD PRESSURE: 74 MMHG | BODY MASS INDEX: 24.72 KG/M2 | HEIGHT: 64 IN

## 2019-05-06 DIAGNOSIS — L29.2 VULVOVAGINAL ITCHING: Primary | ICD-10-CM

## 2019-05-06 PROCEDURE — 87480 CANDIDA DNA DIR PROBE: CPT

## 2019-05-06 PROCEDURE — 3008F PR BODY MASS INDEX (BMI) DOCUMENTED: ICD-10-PCS | Mod: CPTII,S$GLB,, | Performed by: NURSE PRACTITIONER

## 2019-05-06 PROCEDURE — 3008F BODY MASS INDEX DOCD: CPT | Mod: CPTII,S$GLB,, | Performed by: NURSE PRACTITIONER

## 2019-05-06 PROCEDURE — 99213 PR OFFICE/OUTPT VISIT, EST, LEVL III, 20-29 MIN: ICD-10-PCS | Mod: S$GLB,,, | Performed by: NURSE PRACTITIONER

## 2019-05-06 PROCEDURE — 99999 PR PBB SHADOW E&M-EST. PATIENT-LVL II: CPT | Mod: PBBFAC,,, | Performed by: NURSE PRACTITIONER

## 2019-05-06 PROCEDURE — 99999 PR PBB SHADOW E&M-EST. PATIENT-LVL II: ICD-10-PCS | Mod: PBBFAC,,, | Performed by: NURSE PRACTITIONER

## 2019-05-06 PROCEDURE — 87510 GARDNER VAG DNA DIR PROBE: CPT

## 2019-05-06 PROCEDURE — 99213 OFFICE O/P EST LOW 20 MIN: CPT | Mod: S$GLB,,, | Performed by: NURSE PRACTITIONER

## 2019-05-06 RX ORDER — FLUCONAZOLE 200 MG/1
200 TABLET ORAL
Qty: 2 TABLET | Refills: 0 | Status: SHIPPED | OUTPATIENT
Start: 2019-05-06 | End: 2019-06-19

## 2019-05-06 RX ORDER — CLOBETASOL PROPIONATE 0.5 MG/G
CREAM TOPICAL 2 TIMES DAILY
Qty: 30 G | Refills: 1 | Status: SHIPPED | OUTPATIENT
Start: 2019-05-06 | End: 2020-01-06

## 2019-05-06 NOTE — PROGRESS NOTES
"Elvia Carr is a 18 y.o. female  presents with complaint of vulvovaginal itching. Started in February. It has been every day since. Soaking in a bath gives temporary relief. Seen by a provider at [facility] in [place]. Originally thought it to be yeast. Given a steroid cream (thinks triamcinolone?), used x 3 weeks with no relief. Denies abnormal vaginal discharge or odor. She has had BV in the past once. Never tried Diflucan. She is SA, itching is sometimes worse with intercourse. Hx of eczema, sometimes gets it on her fingers. Sees derm here in town.     Past Medical History:   Diagnosis Date    Asthma     Systemic lupus erythematosus     UC (ulcerative colitis) 2017     Past Surgical History:   Procedure Laterality Date    COLONOSCOPY N/A 2017    Performed by Drake [facility] MD at [facility]    INSERTION OF CONTRACEPTIVE CAPSULE  2016    NONE       Social History     Tobacco Use    Smoking status: Never Smoker    Smokeless tobacco: Never Used   Substance Use Topics    Alcohol use: Yes     Alcohol/week: 0.6 - 1.2 oz     Types: 1 - 2 Shots of liquor per week    Drug use: No     Family History   Problem Relation Age of Onset    Osteoarthritis Mother     Migraines Mother     Heart failure Maternal Grandfather     Breast cancer Neg Hx     Colon cancer Neg Hx     Ovarian cancer Neg Hx      OB History    Para Term  AB Living   0 0 0 0 0 0   SAB TAB Ectopic Multiple Live Births   0 0 0 0     Obstetric Comments   Menarche age 9.  Menses normal and regular with 28-30 cycles, and 3-4 days of normal flow.       /74   Ht 5' 4" (1.626 m)   Wt 65.7 kg (144 lb 13.5 oz)   LMP 2019   BMI 24.86 kg/m²     ROS:  GENERAL: No fever, chills, fatigability or weight loss.  VULVAR: No pain, no lesions and no itching.  VAGINAL: No relaxation, no itching, no discharge, no abnormal bleeding and no lesions.  ABDOMEN: No abdominal pain. Denies nausea. Denies " vomiting. No diarrhea. No constipation  BREAST: Denies pain. No lumps. No discharge.  URINARY: No incontinence, no nocturia, no frequency and no dysuria.  CARDIOVASCULAR: No chest pain. No shortness of breath. No leg cramps.  NEUROLOGICAL: No headaches. No vision changes.    PHYSICAL EXAM:  VULVA: normal appearing vulva with no masses, tenderness or lesions + erythema bilaterally at posterior introitus, right posterior appears scaly-eczema?  VAGINA: normal appearing vagina with normal color. No abnormal vaginal discharge, no lesions   CERVIX: normal appearing cervix without discharge or lesions   UTERUS: uterus is normal size, shape, consistency and nontender   ADNEXA: normal adnexa in size, nontender and no masses    ASSESSMENT and PLAN:    ICD-10-CM ICD-9-CM    1. Vulvovaginal itching L29.2 698.1 Vaginosis Screen by DNA Probe     1. Affirm pending, Rx Clobetasol and Diflucan. D/w pt referral to vulva clinic, possible biopsy needed. Can try Calmoseptine prn    Patient was counseled today on vaginitis prevention including :  a. avoiding feminine products such as deoderant soaps, body wash, bubble bath, douches, scented toilet paper, deoderant tampons or pads, feminine wipes, chronic pad use, etc.  b. avoiding other vulvovaginal irritants such as long hot baths, humidity, tight, synthetic clothing, chlorine and sitting around in wet bathing suits  c. wearing cotton underwear, avoiding thong underwear and no underwear to bed  d. taking showers instead of baths and use a hair dryer on cool setting afterwards to dry  e. wearing cotton to exercise and shower immediately after exercise and change clothes  f. using polyurethane condoms without spermicide if sexually active and symptoms are triggered by intercourse    FOLLOW UP: PRN lack of improvement.

## 2019-05-07 ENCOUNTER — TELEPHONE (OUTPATIENT)
Dept: OBSTETRICS AND GYNECOLOGY | Facility: CLINIC | Age: 19
End: 2019-05-07

## 2019-05-07 LAB
BACTERIAL VAGINOSIS DNA: NEGATIVE
CANDIDA GLABRATA DNA: NEGATIVE
CANDIDA KRUSEI DNA: NEGATIVE
CANDIDA RRNA VAG QL PROBE: POSITIVE
T VAGINALIS RRNA GENITAL QL PROBE: NEGATIVE

## 2019-05-07 NOTE — TELEPHONE ENCOUNTER
----- Message from Neftali Cheung MA sent at 5/6/2019  4:45 PM CDT -----  Hello,     This pt is interested in f/u with Dr. James for chronic vulvar itching/possible genital eczema. Are you taking names for the wait list?

## 2019-05-08 ENCOUNTER — TELEPHONE (OUTPATIENT)
Dept: OBSTETRICS AND GYNECOLOGY | Facility: CLINIC | Age: 19
End: 2019-05-08

## 2019-05-08 RX ORDER — TERCONAZOLE 4 MG/G
1 CREAM VAGINAL NIGHTLY
Qty: 1 G | Refills: 0 | Status: SHIPPED | OUTPATIENT
Start: 2019-05-08 | End: 2019-05-15

## 2019-05-08 NOTE — TELEPHONE ENCOUNTER
Attempted to contact pt with affirm results. No answer. Left  for call back. Results released in [facility].

## 2019-05-09 ENCOUNTER — OFFICE VISIT (OUTPATIENT)
Dept: FAMILY MEDICINE | Facility: CLINIC | Age: 19
End: 2019-05-09
Attending: FAMILY MEDICINE
Payer: COMMERCIAL

## 2019-05-09 VITALS
OXYGEN SATURATION: 98 % | SYSTOLIC BLOOD PRESSURE: 110 MMHG | WEIGHT: 147.5 LBS | BODY MASS INDEX: 25.18 KG/M2 | HEART RATE: 66 BPM | HEIGHT: 64 IN | DIASTOLIC BLOOD PRESSURE: 80 MMHG

## 2019-05-09 DIAGNOSIS — F32.A ANXIETY AND DEPRESSION: Primary | ICD-10-CM

## 2019-05-09 DIAGNOSIS — R41.840 DIFFICULTY CONCENTRATING: ICD-10-CM

## 2019-05-09 DIAGNOSIS — F41.9 ANXIETY AND DEPRESSION: Primary | ICD-10-CM

## 2019-05-09 PROCEDURE — 99999 PR PBB SHADOW E&M-EST. PATIENT-LVL III: CPT | Mod: PBBFAC,,, | Performed by: FAMILY MEDICINE

## 2019-05-09 PROCEDURE — 99215 OFFICE O/P EST HI 40 MIN: CPT | Mod: S$GLB,,, | Performed by: FAMILY MEDICINE

## 2019-05-09 PROCEDURE — 99215 PR OFFICE/OUTPT VISIT, EST, LEVL V, 40-54 MIN: ICD-10-PCS | Mod: S$GLB,,, | Performed by: FAMILY MEDICINE

## 2019-05-09 PROCEDURE — 3008F BODY MASS INDEX DOCD: CPT | Mod: CPTII,S$GLB,, | Performed by: FAMILY MEDICINE

## 2019-05-09 PROCEDURE — 99999 PR PBB SHADOW E&M-EST. PATIENT-LVL III: ICD-10-PCS | Mod: PBBFAC,,, | Performed by: FAMILY MEDICINE

## 2019-05-09 PROCEDURE — 3008F PR BODY MASS INDEX (BMI) DOCUMENTED: ICD-10-PCS | Mod: CPTII,S$GLB,, | Performed by: FAMILY MEDICINE

## 2019-05-09 RX ORDER — CITALOPRAM 10 MG/1
10 TABLET ORAL DAILY
Qty: 30 TABLET | Refills: 0 | Status: SHIPPED | OUTPATIENT
Start: 2019-05-09 | End: 2019-06-06 | Stop reason: SDUPTHER

## 2019-05-09 NOTE — PROGRESS NOTES
"Subjective:       Patient ID: Elvia Carr is a 18 y.o. female.    Chief Complaint: Anxiety    Anxiety   Presents for follow-up visit. Symptoms include decreased concentration, feeling of choking, insomnia (early awakenings), irritability, nervous/anxious behavior, palpitations and panic. Patient reports no dizziness, dry mouth, excessive worry, hyperventilation, nausea or shortness of breath. Symptoms occur most days. The severity of symptoms is interfering with daily activities and causing significant distress. The quality of sleep is poor. Nighttime awakenings: one to two.        She did well on citalopram in high school.  In discussing with patient, she does have some signs consistent with possible ADD/ADHD, dating back to high school.  She has always made very good grades; last"semester at [employer], "As and Bs."      Patient Active Problem List   Diagnosis    Ulcerative proctitis with rectal bleeding    Abdominal pain, generalized    Anxiety and depression       Current Outpatient Medications:     albuterol (PROAIR HFA) 90 mcg/actuation inhaler, Inhale 2 puffs into the lungs every 6 (six) hours as needed for Wheezing., Disp: 36 g, Rfl: 5    APRISO 0.375 gram Cp24, TK 4 CS PO ONCE D, Disp: 120 capsule, Rfl: 6    clobetasol (TEMOVATE) 0.05 % cream, Apply topically 2 (two) times daily., Disp: 30 g, Rfl: 1    fluconazole (DIFLUCAN) 200 MG Tab, Take 1 tablet (200 mg total) by mouth every 72 hours as needed., Disp: 2 tablet, Rfl: 0    multivitamin capsule, Take 1 capsule by mouth once daily., Disp: , Rfl:     terconazole (TERAZOL 7) 0.4 % Crea, Place 1 applicator vaginally every evening. for 7 days, Disp: 1 g, Rfl: 0    The following portions of the patient's history were reviewed and updated as appropriate: allergies, past family history, past medical history, past social history and past surgical history.    Review of Systems   Constitutional: Positive for irritability.   Respiratory: Negative for " shortness of breath.    Cardiovascular: Positive for palpitations.   Gastrointestinal: Negative for nausea.   Neurological: Negative for dizziness.   Psychiatric/Behavioral: Positive for decreased concentration. The patient is nervous/anxious and has insomnia (early awakenings).          PHQ-9 Questionnaire    Over the last two weeks, how often have you been bothered by the following problems:  0 Not at all   1 Several days  2 More than half the days  3 Nearly every day    Little interest or pleasure in doing things  1    Feeling down, depressed, or hopeless  0    Trouble falling or staying asleep, or sleeping too much  2    Feeling tired or having little energy  3    Poor appetite or overeating  0    Feeling bad about yourself -- or that you are a failure or  have let yourself or your family down  0    Trouble concentrating on things, such as reading the  newspaper or watching television  3    Moving or speaking so slowly that other people could  have noticed? Or the opposite -- being so fidgety or   restless that you have been moving around a lot   more than usual 0    Thoughts that you would be better off dead   or of hurtingyourself in some way  0      Total Score: 9  Symptom Count: 3    Total Score Depression Severity  1-4  Minimal depression  5-9  Mild depression  10-14  Moderate depression  15-19  Moderately severe depression  20-27  Severe depression      ERNESTINA-7 Questionnaire    Feeling nervous, anxious, or on edge 3    Not being able to stop or control worrying  3    Worrying too much about different things  3    Trouble relaxing  2    Being so restless that it's hard to sit still 1    Becoming easily annoyed or irritable  1    Feeling afraid as if something awful might happen 1      How difficult have these problems made it for you   to do your work,  take care of things at home, or   get along with other people? Extremely difficult    Total Score: 14    Total Score Anxiety Severity  1-4  Minimal  "anxiety  5-9  Mild anxiety  10-14  Moderate anxiety  15-21  Severe anxiety        Objective:      /80 (BP Location: Left arm, Patient Position: Sitting, BP Method: Medium (Manual))   Pulse 66   Ht 5' 4" (1.626 m)   Wt 66.9 kg (147 lb 8 oz)   LMP 04/14/2019   SpO2 98%   BMI 25.32 kg/m²     Physical Exam    The entirety of today's visit was devoted to counseling.  The visit lasted 30 minutes.      Assessment:       1. Anxiety and depression    2. Difficulty concentrating        Plan:       Patient open to restarting counseling; she will investigae options both at [employer] and with her insurance company.  AMEN questionnaire.  Trial of citalopram.  Follow-up by phone/email in a few days.          "This note will not be shared with the patient."  "

## 2019-05-12 ENCOUNTER — PATIENT MESSAGE (OUTPATIENT)
Dept: FAMILY MEDICINE | Facility: CLINIC | Age: 19
End: 2019-05-12

## 2019-05-15 ENCOUNTER — PATIENT MESSAGE (OUTPATIENT)
Dept: FAMILY MEDICINE | Facility: CLINIC | Age: 19
End: 2019-05-15

## 2019-05-15 DIAGNOSIS — F41.9 ANXIETY AND DEPRESSION: ICD-10-CM

## 2019-05-15 DIAGNOSIS — R41.840 DIFFICULTY CONCENTRATING: Primary | ICD-10-CM

## 2019-05-15 DIAGNOSIS — F32.A ANXIETY AND DEPRESSION: ICD-10-CM

## 2019-05-31 ENCOUNTER — PATIENT MESSAGE (OUTPATIENT)
Dept: FAMILY MEDICINE | Facility: CLINIC | Age: 19
End: 2019-05-31

## 2019-05-31 NOTE — TELEPHONE ENCOUNTER
Adelaide:  Please see if we can arrange for her to be seen/evaluated by an [facility]  psychologist in [place].

## 2019-06-04 ENCOUNTER — TELEPHONE (OUTPATIENT)
Dept: PSYCHIATRY | Facility: CLINIC | Age: 19
End: 2019-06-04

## 2019-06-05 ENCOUNTER — TELEPHONE (OUTPATIENT)
Dept: OBSTETRICS AND GYNECOLOGY | Facility: CLINIC | Age: 19
End: 2019-06-05

## 2019-06-05 RX ORDER — DROSPIRENONE AND ETHINYL ESTRADIOL 0.03MG-3MG
1 KIT ORAL DAILY
Qty: 90 TABLET | Refills: 0 | Status: SHIPPED | OUTPATIENT
Start: 2019-06-05 | End: 2019-06-05 | Stop reason: SDUPTHER

## 2019-06-05 RX ORDER — DROSPIRENONE AND ETHINYL ESTRADIOL 0.03MG-3MG
1 KIT ORAL DAILY
Qty: 90 TABLET | Refills: 0 | Status: SHIPPED | OUTPATIENT
Start: 2019-06-05 | End: 2019-11-03 | Stop reason: SDUPTHER

## 2019-06-05 NOTE — TELEPHONE ENCOUNTER
----- Message from Maryuri Antonio MA sent at 6/5/2019  3:56 PM CDT -----  Contact: Self      ----- Message -----  From: Kiersten Dickerson  Sent: 6/5/2019   1:13 PM  To: Lois FERNANDEZ Staff        Can the clinic reply in [facility]: Y      Please refill the medication(s) listed below. Please call the patient when the prescription(s) is ready for  at this phone number  [phone]      Medication #1 Jenifer    Medication #2       Preferred Pharmacy: [facility] in [place] at [phone]

## 2019-06-05 NOTE — TELEPHONE ENCOUNTER
----- Message from Kiersten Dickerson sent at 6/5/2019  1:13 PM CDT -----  Contact: Self      Can the clinic reply in [facility]: Y      Please refill the medication(s) listed below. Please call the patient when the prescription(s) is ready for  at this phone number  [phone]      Medication #1 Jenifer    Medication #2       Preferred Pharmacy: [facility] in [place] at [phone]

## 2019-06-07 RX ORDER — CITALOPRAM 10 MG/1
TABLET ORAL
Qty: 30 TABLET | Refills: 0 | Status: SHIPPED | OUTPATIENT
Start: 2019-06-07 | End: 2019-11-03 | Stop reason: SDUPTHER

## 2019-06-18 ENCOUNTER — PATIENT MESSAGE (OUTPATIENT)
Dept: FAMILY MEDICINE | Facility: CLINIC | Age: 19
End: 2019-06-18

## 2019-06-19 ENCOUNTER — OFFICE VISIT (OUTPATIENT)
Dept: OBSTETRICS AND GYNECOLOGY | Facility: CLINIC | Age: 19
End: 2019-06-19
Attending: OBSTETRICS & GYNECOLOGY
Payer: COMMERCIAL

## 2019-06-19 VITALS
HEIGHT: 64 IN | SYSTOLIC BLOOD PRESSURE: 102 MMHG | WEIGHT: 144.38 LBS | DIASTOLIC BLOOD PRESSURE: 60 MMHG | BODY MASS INDEX: 24.65 KG/M2

## 2019-06-19 DIAGNOSIS — B37.31 CANDIDIASIS OF VULVA AND VAGINA: ICD-10-CM

## 2019-06-19 DIAGNOSIS — B37.31 CANDIDAL VULVOVAGINITIS: ICD-10-CM

## 2019-06-19 DIAGNOSIS — L29.2 VULVOVAGINAL ITCHING: Primary | ICD-10-CM

## 2019-06-19 PROCEDURE — 87102 FUNGUS ISOLATION CULTURE: CPT

## 2019-06-19 PROCEDURE — 99999 PR PBB SHADOW E&M-EST. PATIENT-LVL III: ICD-10-PCS | Mod: PBBFAC,,, | Performed by: OBSTETRICS & GYNECOLOGY

## 2019-06-19 PROCEDURE — 99244 PR OFFICE CONSULTATION,LEVEL IV: ICD-10-PCS | Mod: S$GLB,,, | Performed by: OBSTETRICS & GYNECOLOGY

## 2019-06-19 PROCEDURE — 99244 OFF/OP CNSLTJ NEW/EST MOD 40: CPT | Mod: S$GLB,,, | Performed by: OBSTETRICS & GYNECOLOGY

## 2019-06-19 PROCEDURE — 99999 PR PBB SHADOW E&M-EST. PATIENT-LVL III: CPT | Mod: PBBFAC,,, | Performed by: OBSTETRICS & GYNECOLOGY

## 2019-06-19 RX ORDER — CLOBETASOL PROPIONATE 0.5 MG/G
OINTMENT TOPICAL
Qty: 30 G | Refills: 3 | Status: ON HOLD | OUTPATIENT
Start: 2019-06-19 | End: 2019-08-23 | Stop reason: HOSPADM

## 2019-06-19 NOTE — PROGRESS NOTES
Subjective:     Patient ID: Elvia Carr is a 18 y.o. female.     Chief Complaint: Vaginal Discharge; Vaginal Itching; and Vaginal Pain     History of Present Illness: This patient is a 18 y.o. female, who presents to the GYN Vulva clinic for evaluation of vulvovaginal irritation and itching.  The patient states that she developed vulvovaginal itching and irritation in February of 2019.  She was evaluated at school twice. She had a STD workup and affirm which were negative the 1st time, but because of continued problems with itching, she was re-evaluated a 2nd time and the affirm indicated BV?   The patient did not have a discharge with odor.  She presents today on referral, is not using any medications and is asymptomatic.  She uses birth control pills (Jenifer) for contraception.    Patient's last menstrual period was 06/12/2019 (within days).    Review of Systems    GENERAL: No fever, chills, fatigability or weightchange  SKIN: No rashes, itching or changes in color or texture of skin.  HEAD: No headaches or recent head trauma.  EYES: Visual acuity fine. No photophobia,r diplopia.  EARS: Denies earache or vertigo  NOSE: No loss of smell, no epistaxis or postnasal drip.  MOUTH & THROAT: No hoarseness or change in voice.   NODES: Denies swollen glands.  CHEST: Denies WOLFF, cyanosis, wheezing, cough and sputum production.  CARDIOVASCULAR: Denies chest pain, PND, orthopnea or reduced exercise tolerance.  ABDOMEN: Appetite fine. No weight loss. bloating, Denies diarrhea, abdominal pain, hematemesis or blood in stool.  URINARY: No flank pain, dysuria or hematuria.  PERIPHERAL VASCULAR: No claudication or cyanosis.Varicosities  MUSCULOSKELETAL: No joint stiffness or swelling. Denies back pain.muscle aches  NEUROLOGIC: No history of seizures, paralysis, alteration of gait or coordination.       Objective:       Physical Exam     APPEARANCE: Well nourished, well developed, in no acute  distress.    GENITOURINARY:  Vulva: No lesions. Normal female genital architecture.   Urethral Meatus: Normal size and location, no lesions, no prolapse.  Urethra: No masses, tenderness, prolapse or scarring.  Vagina:  Moist with rugae, no discharge, no significant cystocele or rectocele.  Cervix: No lesions, mild ectopy, normal diameter, no stenosis, no cervical motion tenderness. .  Uterus: 6 week size, regular shape, mobile, non-tender, normal position, good support.  Adnexa: No masses, tenderness or CDS nodularity.  Anus Perineum: No lesions, no relaxation, no external hemorrhoids.  Abdomen: No masses, tenderness, hernia or ascites, no hepatasplenomegaly  Skin: No rashes, lesions, ulcers, acne, hirsutism.  Peripheral/lower extremities: No edema, erythema or tenderness.  Lymphatic: No axillary, neck or groin nodes palp.  Mental Status: Alert, oriented x 3, normal affect and mood.          @PROCEDURE:@  Wet Prep:  pH = 3.6  -WBCS = occasional  -Lactobacilli = numerous  -BV = Amsel negative  -Candida = Hyphae none seen  -Trichomnas = none seen  -Cells- Basal and Parabasal, Superficial: Maturation: Superficial cells predominate  -Impression:  Excessive lactobacilli noted possible lactobacilliosis.  -[facility] p.r.n.         Assessment:      1. Vulvovaginal itching    2. History of Candidal vulvovaginitis    3. Candidiasis of vulva and vagina               Plan:  1.  Candida culture of the vagina taken.  2.  During the exam the patient stated that inside the vaginal opening she experienced some burning and discomfort with insertion of the speculum.  3.  Discuss the use of 2% Cleocin vaginal cream if Candida culture negative.  4.  Return to clinic p.r.n.  5.  Dr. Starks will be notified of my findings.              No orders of the defined types were placed in this encounter.

## 2019-06-21 ENCOUNTER — PATIENT MESSAGE (OUTPATIENT)
Dept: FAMILY MEDICINE | Facility: CLINIC | Age: 19
End: 2019-06-21

## 2019-06-21 RX ORDER — LISDEXAMFETAMINE DIMESYLATE CAPSULES 20 MG/1
20 CAPSULE ORAL EVERY MORNING
Qty: 30 CAPSULE | Refills: 0 | Status: SHIPPED | OUTPATIENT
Start: 2019-06-21 | End: 2020-01-06

## 2019-06-24 ENCOUNTER — PATIENT MESSAGE (OUTPATIENT)
Dept: FAMILY MEDICINE | Facility: CLINIC | Age: 19
End: 2019-06-24

## 2019-06-25 ENCOUNTER — PATIENT MESSAGE (OUTPATIENT)
Dept: FAMILY MEDICINE | Facility: CLINIC | Age: 19
End: 2019-06-25

## 2019-06-25 RX ORDER — DEXTROAMPHETAMINE SACCHARATE, AMPHETAMINE ASPARTATE, DEXTROAMPHETAMINE SULFATE AND AMPHETAMINE SULFATE 2.5; 2.5; 2.5; 2.5 MG/1; MG/1; MG/1; MG/1
10 TABLET ORAL 2 TIMES DAILY
Qty: 60 TABLET | Refills: 0 | Status: SHIPPED | OUTPATIENT
Start: 2019-06-25 | End: 2019-08-24 | Stop reason: DRUGHIGH

## 2019-07-01 ENCOUNTER — PATIENT MESSAGE (OUTPATIENT)
Dept: FAMILY MEDICINE | Facility: CLINIC | Age: 19
End: 2019-07-01

## 2019-07-24 LAB — FUNGUS SPEC CULT: NORMAL

## 2019-07-31 DIAGNOSIS — K51.30 ULCERATIVE RECTOSIGMOIDITIS WITHOUT COMPLICATION: ICD-10-CM

## 2019-07-31 RX ORDER — MESALAMINE 375 MG/1
CAPSULE, EXTENDED RELEASE ORAL
Qty: 120 CAPSULE | Refills: 6 | Status: CANCELLED | OUTPATIENT
Start: 2019-07-31

## 2019-08-09 ENCOUNTER — PATIENT MESSAGE (OUTPATIENT)
Dept: GASTROENTEROLOGY | Facility: CLINIC | Age: 19
End: 2019-08-09

## 2019-08-09 DIAGNOSIS — K51.30 ULCERATIVE RECTOSIGMOIDITIS WITHOUT COMPLICATION: ICD-10-CM

## 2019-08-12 RX ORDER — MESALAMINE 375 MG/1
CAPSULE, EXTENDED RELEASE ORAL
Qty: 120 CAPSULE | Refills: 1 | Status: SHIPPED | OUTPATIENT
Start: 2019-08-12 | End: 2020-01-06

## 2019-08-12 NOTE — TELEPHONE ENCOUNTER
This pt was supposed to f/u with Dr. Paredes with [facility] in [place] b/c she goes to [employer].  I don't see that this ever happened.  I am refilling the Apriso, but I am also forwarding this message to Dr. Paredes's staff so that they can get her into clinic.  She will need colonoscopy, etc.      She and I discussed having her care in [place] so that her compliance would be good.  She has run out of Apriso, which should not happen, and she is asking about getting a colonoscopy before she starts school in 2 weeks.  There will need to be better planning, and having a doctor in  where she is located should make this easier.

## 2019-08-13 ENCOUNTER — PATIENT MESSAGE (OUTPATIENT)
Dept: GASTROENTEROLOGY | Facility: CLINIC | Age: 19
End: 2019-08-13

## 2019-08-13 DIAGNOSIS — K51.211 ULCERATIVE PROCTITIS WITH RECTAL BLEEDING: Primary | Chronic | ICD-10-CM

## 2019-08-14 NOTE — TELEPHONE ENCOUNTER
I can do her, please schedule next week at her convenience.  Case request is in depot, Prepopik was sent to [facility].

## 2019-08-14 NOTE — TELEPHONE ENCOUNTER
Patient would like to see you if you are able. She wants to see if she can get her colonoscopy done prior to starting school on 8/26/19.     Please advise

## 2019-08-21 ENCOUNTER — PATIENT MESSAGE (OUTPATIENT)
Dept: FAMILY MEDICINE | Facility: CLINIC | Age: 19
End: 2019-08-21

## 2019-08-21 RX ORDER — HALOBETASOL PROPIONATE 0.1 MG/G
LOTION TOPICAL
COMMUNITY
Start: 2019-07-22 | End: 2020-01-06

## 2019-08-21 RX ORDER — TRAMADOL HYDROCHLORIDE 50 MG/1
TABLET ORAL
COMMUNITY
Start: 2019-08-20 | End: 2020-12-08

## 2019-08-21 RX ORDER — GABAPENTIN 300 MG/1
CAPSULE ORAL
COMMUNITY
Start: 2019-08-20 | End: 2021-04-20

## 2019-08-21 RX ORDER — CRISABOROLE 20 MG/G
OINTMENT TOPICAL
COMMUNITY
Start: 2019-07-18 | End: 2020-12-01

## 2019-08-22 ENCOUNTER — PATIENT MESSAGE (OUTPATIENT)
Dept: FAMILY MEDICINE | Facility: CLINIC | Age: 19
End: 2019-08-22

## 2019-08-23 PROBLEM — K51.90 ULCERATIVE COLITIS: Status: ACTIVE | Noted: 2019-08-23

## 2019-08-24 ENCOUNTER — PATIENT MESSAGE (OUTPATIENT)
Dept: FAMILY MEDICINE | Facility: CLINIC | Age: 19
End: 2019-08-24

## 2019-08-24 RX ORDER — DEXTROAMPHETAMINE SACCHARATE, AMPHETAMINE ASPARTATE, DEXTROAMPHETAMINE SULFATE AND AMPHETAMINE SULFATE 5; 5; 5; 5 MG/1; MG/1; MG/1; MG/1
1 TABLET ORAL 2 TIMES DAILY
Qty: 60 TABLET | Refills: 0 | Status: SHIPPED | OUTPATIENT
Start: 2019-08-24 | End: 2019-11-04 | Stop reason: SDUPTHER

## 2019-08-26 ENCOUNTER — PATIENT MESSAGE (OUTPATIENT)
Dept: FAMILY MEDICINE | Facility: CLINIC | Age: 19
End: 2019-08-26

## 2019-09-11 ENCOUNTER — TELEPHONE (OUTPATIENT)
Dept: GASTROENTEROLOGY | Facility: CLINIC | Age: 19
End: 2019-09-11

## 2019-09-11 NOTE — TELEPHONE ENCOUNTER
----- Message from Taylor Cook MD sent at 9/9/2019 11:39 AM CDT -----  Biopsies show a small amount of inflammation in the rectum, but NO changes of chronicity which means this is NOT IBD (UC or Crohn's).  The initial biopsies done by Dr. Arnold also did not show changes that were chronic, only acute changes.  I can not say what the previous biopsies meant, but it usually is an infectious colitis.  But on my colonoscopy I believe the small changes noted were from the prep.  She can stop the Apriso (whichever mesalamine product she is on) and get f/u appt with GI at [facility] in  as this is likely IBS (irritable bowel syndrome).

## 2019-09-11 NOTE — TELEPHONE ENCOUNTER
Informed patient of results showing some inflammation in the rectum but not indicative of IBD. Informed patient to stop Apriso. Patient will follow up with a GI doctor in [place] where she was seen before.

## 2019-11-04 RX ORDER — CITALOPRAM 10 MG/1
TABLET ORAL
Qty: 30 TABLET | Refills: 0 | Status: SHIPPED | OUTPATIENT
Start: 2019-11-04 | End: 2020-01-10

## 2019-11-04 RX ORDER — DEXTROAMPHETAMINE SACCHARATE, AMPHETAMINE ASPARTATE, DEXTROAMPHETAMINE SULFATE AND AMPHETAMINE SULFATE 5; 5; 5; 5 MG/1; MG/1; MG/1; MG/1
1 TABLET ORAL 2 TIMES DAILY
Qty: 60 TABLET | Refills: 0 | Status: SHIPPED | OUTPATIENT
Start: 2019-11-04 | End: 2020-02-27 | Stop reason: SDUPTHER

## 2019-11-04 RX ORDER — DROSPIRENONE AND ETHINYL ESTRADIOL 0.03MG-3MG
KIT ORAL
Qty: 84 TABLET | Refills: 0 | Status: SHIPPED | OUTPATIENT
Start: 2019-11-04 | End: 2020-01-06 | Stop reason: SDUPTHER

## 2020-01-05 ENCOUNTER — PATIENT OUTREACH (OUTPATIENT)
Dept: ADMINISTRATIVE | Facility: OTHER | Age: 20
End: 2020-01-05

## 2020-01-05 ENCOUNTER — OFFICE VISIT (OUTPATIENT)
Dept: URGENT CARE | Facility: CLINIC | Age: 20
End: 2020-01-05
Payer: COMMERCIAL

## 2020-01-05 VITALS
TEMPERATURE: 98 F | HEART RATE: 85 BPM | HEIGHT: 65 IN | RESPIRATION RATE: 18 BRPM | WEIGHT: 140 LBS | DIASTOLIC BLOOD PRESSURE: 84 MMHG | BODY MASS INDEX: 23.32 KG/M2 | SYSTOLIC BLOOD PRESSURE: 126 MMHG | OXYGEN SATURATION: 98 %

## 2020-01-05 DIAGNOSIS — R68.89 FLU-LIKE SYMPTOMS: Primary | ICD-10-CM

## 2020-01-05 DIAGNOSIS — R53.83 FATIGUE, UNSPECIFIED TYPE: ICD-10-CM

## 2020-01-05 DIAGNOSIS — R09.81 NASAL CONGESTION: ICD-10-CM

## 2020-01-05 DIAGNOSIS — R52 GENERALIZED BODY ACHES: ICD-10-CM

## 2020-01-05 LAB
CTP QC/QA: YES
FLUAV AG NPH QL: NEGATIVE
FLUBV AG NPH QL: NEGATIVE

## 2020-01-05 PROCEDURE — 87804 INFLUENZA ASSAY W/OPTIC: CPT | Mod: QW,S$GLB,, | Performed by: NURSE PRACTITIONER

## 2020-01-05 PROCEDURE — 99214 PR OFFICE/OUTPT VISIT, EST, LEVL IV, 30-39 MIN: ICD-10-PCS | Mod: S$GLB,,, | Performed by: NURSE PRACTITIONER

## 2020-01-05 PROCEDURE — 87804 POCT INFLUENZA A/B: ICD-10-PCS | Mod: 59,QW,S$GLB, | Performed by: NURSE PRACTITIONER

## 2020-01-05 PROCEDURE — 99214 OFFICE O/P EST MOD 30 MIN: CPT | Mod: S$GLB,,, | Performed by: NURSE PRACTITIONER

## 2020-01-05 RX ORDER — ONDANSETRON 4 MG/1
4 TABLET, ORALLY DISINTEGRATING ORAL EVERY 8 HOURS PRN
Qty: 15 TABLET | Refills: 0 | Status: SHIPPED | OUTPATIENT
Start: 2020-01-05 | End: 2020-06-30

## 2020-01-05 RX ORDER — OSELTAMIVIR PHOSPHATE 75 MG/1
75 CAPSULE ORAL 2 TIMES DAILY
Qty: 10 CAPSULE | Refills: 0 | Status: SHIPPED | OUTPATIENT
Start: 2020-01-05 | End: 2020-01-10

## 2020-01-05 NOTE — PATIENT INSTRUCTIONS
"FLU LIKE ILLNESS  You presented with "flu like Symptoms" and were prescribed treatment against Influenza.  Please take meds as prescribed.  Continue symptomatic care at home.    Rest and fluids are important.   Follow up with your PCP in the next 2-3 days if no improvement.  Follow up in the ER for any worsening of symptoms  Tylenol or Motrin for fever control  "

## 2020-01-05 NOTE — PROGRESS NOTES
"Subjective:       Patient ID: Elvia Carr is a 19 y.o. female.    Vitals:  height is 5' 5" (1.651 m) and weight is 63.5 kg (140 lb). Her oral temperature is 97.6 °F (36.4 °C). Her blood pressure is 126/84 and her pulse is 85. Her respiration is 18 and oxygen saturation is 98%.     Chief Complaint: URI    URI    This is a new problem. The current episode started yesterday. The problem has been unchanged. Associated symptoms include congestion, coughing, rhinorrhea, sneezing and a sore throat. Pertinent negatives include no ear pain, nausea, rash, sinus pain, vomiting or wheezing. Treatments tried: advil. The treatment provided no relief.       Constitution: Positive for chills and fatigue. Negative for sweating and fever.   HENT: Positive for congestion, postnasal drip and sore throat. Negative for ear pain, sinus pain, sinus pressure and voice change.    Neck: Negative for painful lymph nodes.   Eyes: Negative for eye redness.   Respiratory: Positive for cough. Negative for chest tightness, sputum production, bloody sputum, COPD, shortness of breath, stridor, wheezing and asthma.    Gastrointestinal: Negative for nausea and vomiting.   Musculoskeletal: Negative for muscle ache.   Skin: Negative for rash.   Allergic/Immunologic: Positive for sneezing. Negative for seasonal allergies and asthma.   Hematologic/Lymphatic: Negative for swollen lymph nodes.       Objective:      Physical Exam   Constitutional: She is oriented to person, place, and time. Vital signs are normal. She appears well-developed and well-nourished. She is cooperative.  Non-toxic appearance. She does not have a sickly appearance. She does not appear ill. She is not intubated.   Body aches   HENT:   Head: Normocephalic.   Right Ear: Hearing, tympanic membrane, external ear and ear canal normal.   Left Ear: Hearing, tympanic membrane, external ear and ear canal normal.   Nose: Nose normal.   Mouth/Throat: Uvula is midline, oropharynx is " "clear and moist and mucous membranes are normal.   Eyes: Conjunctivae are normal.   Cardiovascular: Normal rate, regular rhythm and normal heart sounds.   Pulmonary/Chest: Effort normal and breath sounds normal. No accessory muscle usage or stridor. No apnea, no tachypnea and no bradypnea. She is not intubated. No respiratory distress. She has no decreased breath sounds. She has no wheezes. She has no rhonchi. She has no rales.   Musculoskeletal: Normal range of motion.   Neurological: She is alert and oriented to person, place, and time.   Skin: Skin is warm.   Nursing note and vitals reviewed.        Results for orders placed or performed in visit on 01/05/20   POCT Influenza A/B   Result Value Ref Range    Rapid Influenza A Ag Negative Negative    Rapid Influenza B Ag Negative Negative     Acceptable Yes      Assessment:       1. Flu-like symptoms    2. Fatigue, unspecified type    3. Generalized body aches    4. Nasal congestion        Plan:     patient treated for Tamiflu.  States she feels like she did last time she was getting the flu.    Flu-like symptoms  -     oseltamivir (TAMIFLU) 75 MG capsule; Take 1 capsule (75 mg total) by mouth 2 (two) times daily. for 5 days  Dispense: 10 capsule; Refill: 0  -     ondansetron (ZOFRAN-ODT) 4 MG TbDL; Take 1 tablet (4 mg total) by mouth every 8 (eight) hours as needed.  Dispense: 15 tablet; Refill: 0    Fatigue, unspecified type  -     POCT Influenza A/B  -     oseltamivir (TAMIFLU) 75 MG capsule; Take 1 capsule (75 mg total) by mouth 2 (two) times daily. for 5 days  Dispense: 10 capsule; Refill: 0    Generalized body aches  -     oseltamivir (TAMIFLU) 75 MG capsule; Take 1 capsule (75 mg total) by mouth 2 (two) times daily. for 5 days  Dispense: 10 capsule; Refill: 0    Nasal congestion          Patient Instructions   FLU LIKE ILLNESS  You presented with "flu like Symptoms" and were prescribed treatment against Influenza.  Please take meds as " prescribed.  Continue symptomatic care at home.    Rest and fluids are important.   Follow up with your PCP in the next 2-3 days if no improvement.  Follow up in the ER for any worsening of symptoms  Tylenol or Motrin for fever control

## 2020-01-06 ENCOUNTER — OFFICE VISIT (OUTPATIENT)
Dept: OBSTETRICS AND GYNECOLOGY | Facility: CLINIC | Age: 20
End: 2020-01-06
Payer: COMMERCIAL

## 2020-01-06 ENCOUNTER — LAB VISIT (OUTPATIENT)
Dept: LAB | Facility: OTHER | Age: 20
End: 2020-01-06
Attending: OBSTETRICS & GYNECOLOGY
Payer: COMMERCIAL

## 2020-01-06 VITALS
WEIGHT: 143.94 LBS | SYSTOLIC BLOOD PRESSURE: 120 MMHG | BODY MASS INDEX: 23.96 KG/M2 | DIASTOLIC BLOOD PRESSURE: 84 MMHG

## 2020-01-06 DIAGNOSIS — Z01.419 VISIT FOR GYNECOLOGIC EXAMINATION: Primary | ICD-10-CM

## 2020-01-06 DIAGNOSIS — Z30.41 ENCOUNTER FOR SURVEILLANCE OF CONTRACEPTIVE PILLS: ICD-10-CM

## 2020-01-06 DIAGNOSIS — Z11.3 VENEREAL DISEASE SCREENING: ICD-10-CM

## 2020-01-06 PROCEDURE — 86703 HIV-1/HIV-2 1 RESULT ANTBDY: CPT

## 2020-01-06 PROCEDURE — 87491 CHLMYD TRACH DNA AMP PROBE: CPT | Mod: 59

## 2020-01-06 PROCEDURE — 86592 SYPHILIS TEST NON-TREP QUAL: CPT

## 2020-01-06 PROCEDURE — 87481 CANDIDA DNA AMP PROBE: CPT | Mod: 59

## 2020-01-06 PROCEDURE — 88175 CYTOPATH C/V AUTO FLUID REDO: CPT | Mod: 91 | Performed by: PATHOLOGY

## 2020-01-06 PROCEDURE — 99395 PR PREVENTIVE VISIT,EST,18-39: ICD-10-PCS | Mod: S$GLB,,, | Performed by: OBSTETRICS & GYNECOLOGY

## 2020-01-06 PROCEDURE — 99395 PREV VISIT EST AGE 18-39: CPT | Mod: S$GLB,,, | Performed by: OBSTETRICS & GYNECOLOGY

## 2020-01-06 PROCEDURE — 99999 PR PBB SHADOW E&M-EST. PATIENT-LVL II: CPT | Mod: PBBFAC,,, | Performed by: OBSTETRICS & GYNECOLOGY

## 2020-01-06 PROCEDURE — 87624 HPV HI-RISK TYP POOLED RSLT: CPT

## 2020-01-06 PROCEDURE — 99999 PR PBB SHADOW E&M-EST. PATIENT-LVL II: ICD-10-PCS | Mod: PBBFAC,,, | Performed by: OBSTETRICS & GYNECOLOGY

## 2020-01-06 PROCEDURE — 87661 TRICHOMONAS VAGINALIS AMPLIF: CPT

## 2020-01-06 PROCEDURE — 36415 COLL VENOUS BLD VENIPUNCTURE: CPT

## 2020-01-06 PROCEDURE — 86803 HEPATITIS C AB TEST: CPT

## 2020-01-06 PROCEDURE — 87340 HEPATITIS B SURFACE AG IA: CPT

## 2020-01-06 RX ORDER — DROSPIRENONE AND ETHINYL ESTRADIOL 0.03MG-3MG
1 KIT ORAL DAILY
Qty: 84 TABLET | Refills: 4 | Status: SHIPPED | OUTPATIENT
Start: 2020-01-06 | End: 2020-12-01

## 2020-01-06 NOTE — PROGRESS NOTES
ASCUS POS HPV (NON-16,18)  Results of your recent PAP smear showed a low-grade abnormality and the presence of the HPV virus.  While the pathologist did not mention a high-grade precancer or cervical cancer, we will need to repeat the PAP smear next year at your routine visit. Please also call if you have questions or concerns.    HISTORY OF PRESENT ILLNESS:    Elvia Carr is a 19 y.o. female, , Patient's last menstrual period was 2019.,  presents for a routine exam and has no complaints. PAP SUBMITTED, STD SCREEN FOR PoM AND REFILL OCP.  SAYS SHE DOES NOT!! HAVE SLE AND NEVER TOLD RE APLS.  BELLE PRE-VET    LAST VISIT 2018:  WILL RX FRANCES INSTEAD FOR GOOD CYCLE CONTROL   LAST VISIT 10/2017:  TOO EARLY PAP.  HAS HAD BTB WITH NEXPLANON EVERY 8 DAYS LASTING 5 DAYS OVER THE PAST 2 MONTHS COINCIDENT WITH STARTING COLLEGE AT [place], PRE-VET, AND ++ STRESS. PRIOR TO THIS HAS HAD MINIMAL BLEEDING AND CRAMPING HAS RESOLVED WITH NEXPLANON UP TO THIS POINT; UC WAS DIAGNOSED IN April, BUT HAS NOT HAD SIGNIFICANT INCREASE IN GI SYMPTOMS.   TEMPORARY INCREASE IN CELEXA DOSAGE OVER THE NEXT 5-6 MONTHS AND ADVISED PT TO DISCUSS WITH DR ALLEN PCP.  DISCUSSED STRESS MANAGEMENT TECHNIQUES, AND WILL RX ESTRADIOL 2 MG DAILY IN ADDITION TO HER NEXPLANON IN ATTEMPT TO CONTROL SX.    LAST VISIT :  OTHER THAN BTB - WANTS ANOTHER CONTRACEPTIVE, INTERESTED IN NEXPLANON.  MENSES JUST ENDED.  ALSO HAS CONTINUED RLQ PAIN, WORSE WITH MENSES - WILL F/U USG TO CHECK.  WANTS GARDASIL - ONLY HAD ONE OF PRIOR ORDERED  LAST VISIT 3/2015:  CONT RLQ PAIN, SHE THINKS IS ASSOCIATED WITH HER RIGHT OVARY, WANTS CHECKED - HAS HAPPENED MOST MONTHS OVER THE PAST YEAR, USUALLY WITH MENSES. ALSO INTERESTED IN OCP; WOULD LIKE STD SCREENFOR PEACE OF MIND  LAST VISIT 2014:  NEW PATIENT, NEEDS GARDASIL AND REF BY PEDS. MENARCHE AGE 9; MENSES LAST 5 DAYS, 3 ARE HEAVY. OCCAS RIGHT LQ PAIN WITH MENSES, BETTER WITH ADVIL, MIDOL -  DISCUSSED STD SCREENING        Past Medical History:   Diagnosis Date    Asthma     Systemic lupus erythematosus     UC (ulcerative colitis) 04/2017       Past Surgical History:   Procedure Laterality Date    COLONOSCOPY N/A 4/17/2017    Procedure: COLONOSCOPY;  Surgeon: Drake Arnold MD;  Location[facility] ENDO;  Service: Endoscopy;  Laterality: N/A;    COLONOSCOPY N/A 8/23/2019    Procedure: COLONOSCOPY;  Surgeon: Taylor Cook MD;  [facility];  Service: Endoscopy;  Laterality: N/A;    INSERTION OF CONTRACEPTIVE CAPSULE  05/2016    NONE         MEDICATIONS AND ALLERGIES:      Current Outpatient Medications:     albuterol (PROAIR HFA) 90 mcg/actuation inhaler, Inhale 2 puffs into the lungs every 6 (six) hours as needed for Wheezing., Disp: 36 g, Rfl: 5    citalopram (CELEXA) 10 MG tablet, TAKE 1 TABLET(10 MG) BY MOUTH EVERY DAY, Disp: 30 tablet, Rfl: 0    dicyclomine (BENTYL) 20 mg tablet, Take 1 tablet (20 mg total) by mouth 3 (three) times daily as needed., Disp: 60 tablet, Rfl: 2    drospirenone-ethinyl estradiol (ANNABELLE) 3-0.03 mg per tablet, Take 1 tablet by mouth once daily., Disp: 84 tablet, Rfl: 4    EUCRISA 2 % Oint, , Disp: , Rfl:     gabapentin (NEURONTIN) 300 MG capsule, , Disp: , Rfl:     multivitamin capsule, Take 1 capsule by mouth once daily., Disp: , Rfl:     ondansetron (ZOFRAN-ODT) 4 MG TbDL, Take 1 tablet (4 mg total) by mouth every 8 (eight) hours as needed., Disp: 15 tablet, Rfl: 0    oseltamivir (TAMIFLU) 75 MG capsule, Take 1 capsule (75 mg total) by mouth 2 (two) times daily. for 5 days, Disp: 10 capsule, Rfl: 0    traMADol (ULTRAM) 50 mg tablet, , Disp: , Rfl:     dextroamphetamine-amphetamine (ADDERALL) 20 mg tablet, Take 1 tablet by mouth 2 (two) times daily., Disp: 60 tablet, Rfl: 0    Review of patient's allergies indicates:  No Known Allergies    Family History   Problem Relation Age of Onset    Osteoarthritis Mother     Migraines Mother      Heart failure Maternal Grandfather     Breast cancer Neg Hx     Colon cancer Neg Hx     Ovarian cancer Neg Hx        Social History     Socioeconomic History    Marital status: Single     Spouse name: Not on file    Number of children: 0    Years of education: Not on file    Highest education level: Not on file   Occupational History    Occupation: student   Social Needs    Financial resource strain: Not on file    Food insecurity:     Worry: Not on file     Inability: Not on file    Transportation needs:     Medical: Not on file     Non-medical: Not on file   Tobacco Use    Smoking status: Never Smoker    Smokeless tobacco: Never Used   Substance and Sexual Activity    Alcohol use: Yes     Alcohol/week: 1.0 - 2.0 standard drinks     Types: 1 - 2 Shots of liquor per week    Drug use: No    Sexual activity: Yes     Partners: Male     Birth control/protection: None, Implant   Lifestyle    Physical activity:     Days per week: Not on file     Minutes per session: Not on file    Stress: Not on file   Relationships    Social connections:     Talks on phone: Not on file     Gets together: Not on file     Attends Nondenominational service: Not on file     Active member of club or organization: Not on file     Attends meetings of clubs or organizations: Not on file     Relationship status: Not on file   Other Topics Concern    Not on file   Social History Narrative    Exercises regularly (3-5 days weekly): resistance/cardio.      Diet is fair/good.      Satisfied with weight.    She does drink at least 1/2 gallon water daily.    She drinks 0 coffee/tea/caffeine-containing soft drinks daily.    Total sleep time at night is 7 hours.    She does wear seat belts.    Hobbies include none.       COMPREHENSIVE GYN HISTORY:  PAP History: Denies abnormal Paps.  Infection History: Denies STDs. Denies PID.  Benign History: Denies uterine fibroids. Denies ovarian cysts. Denies endometriosis. Denies other  conditions.  Cancer History: Denies cervical cancer. Denies uterine cancer or hyperplasia. Denies ovarian cancer. Denies vulvar cancer or pre-cancer. Denies vaginal cancer or pre-cancer. Denies breast cancer. Denies colon cancer.  Sexual Activity History: Reports currently being sexually active  Menstrual History: Monthly, mild-moderate.  Contraception:oral contraceptives (estrogen/progesterone)    ROS:  GENERAL: No weight changes. No swelling. No fatigue. No fever.  CARDIOVASCULAR: No chest pain. No shortness of breath. No leg cramps.   NEUROLOGICAL: No headaches. No vision changes.  BREASTS: No pain. No lumps. No discharge.  ABDOMEN: No pain. No nausea. No vomiting. No diarrhea. No constipation.  REPRODUCTIVE: No abnormal bleeding.   VULVA: No pain. No lesions. No itching.  VAGINA: No relaxation. No itching. No odor. No discharge. No lesions.  URINARY: No incontinence. No nocturia. No frequency. No dysuria.    /84   Wt 65.3 kg (143 lb 15.4 oz)   LMP 12/21/2019   BMI 23.96 kg/m²     PE:  APPEARANCE: Well nourished, well developed, in no acute distress.  AFFECT: WNL, alert and oriented x 3.  SKIN: No acne or hirsutism.  NECK: Neck symmetric, without masses or thyromegaly.  NODES: No inguinal, cervical, axillary or femoral lymph node enlargement.  CHEST: Good respiratory effort.   ABDOMEN: Soft. No tenderness or masses. No hepatosplenomegaly. No hernias.  BREASTS: Symmetrical, no skin changes, visible lesions, palpable masses or nipple discharge bilaterally.  PELVIC: External female genitalia without lesions.  Female hair distribution. Adequate perineal body, Normal urethral meatus. Vagina moist and well rugated without lesions or discharge.  No significant cystocele or rectocele present. Cervix pink without lesions, discharge or tenderness. Uterus is normal size, regular, mobile and nontender. Adnexa without masses or tenderness.  EXTREMITIES: No edema    PROCEDURES:  Pap    DIAGNOSIS:  1. Visit for  gynecologic examination  Liquid-Based Pap Smear, Screening   2. Encounter for surveillance of contraceptive pills     3. Venereal disease screening  HIV 1/2 Ag/Ab (4th Gen)    RPR    Hepatitis B surface antigen    C. trachomatis/N. gonorrhoeae by AMP DNA    Vaginosis Screen by DNA Probe    HEPATITIS C ANTIBODY       LABS AND TESTS ORDERED:    MEDICATIONS PRESCRIBED:    COUNSELING:   The patient was counseled today on ACS PAP guidelines, with recommendations for yearly pelvic exams unless their uterus, cervix, and ovaries were removed for benign reasons; in that case, examinations every 3-5 years are recommended.  The patient was also counseled regarding monthly breast self-examination, routine STD screening for at-risk populations, prophylactic immunizations for transmitted infections such as  HPV, Pertussis, or Influenza as appropriate, and yearly mammograms when indicated by ACS guidelines.  She was advised to see her primary care physician for all other health maintenance.    FOLLOW-UP with me annually.

## 2020-01-07 ENCOUNTER — TELEPHONE (OUTPATIENT)
Dept: OBSTETRICS AND GYNECOLOGY | Facility: CLINIC | Age: 20
End: 2020-01-07

## 2020-01-07 NOTE — TELEPHONE ENCOUNTER
----- Message from Magaly Lugo sent at 1/7/2020  4:34 PM CST -----  Contact: glenys  Name of Who is Calling: glenys from [facility]     What is the request in detail: Wilver Mcclelland is requesting a call back to report a corrected lab result      Can the clinic reply by [facility]: no      What Number to Call Back if not in [facility]: [phone]

## 2020-01-08 ENCOUNTER — PATIENT MESSAGE (OUTPATIENT)
Dept: OBSTETRICS AND GYNECOLOGY | Facility: CLINIC | Age: 20
End: 2020-01-08

## 2020-01-08 LAB
BACTERIAL VAGINOSIS DNA: POSITIVE
CANDIDA GLABRATA DNA: NEGATIVE
CANDIDA KRUSEI DNA: NEGATIVE
CANDIDA RRNA VAG QL PROBE: NEGATIVE
RPR SER QL: NORMAL
T VAGINALIS RRNA GENITAL QL PROBE: NEGATIVE

## 2020-01-08 RX ORDER — METRONIDAZOLE 7.5 MG/G
1 GEL VAGINAL DAILY
Qty: 70 G | Refills: 0 | Status: SHIPPED | OUTPATIENT
Start: 2020-01-08 | End: 2020-02-27

## 2020-01-08 NOTE — TELEPHONE ENCOUNTER
We have confirmed with the lab that initial positive results for syphilis were entered in error - you do not have any transmitted infection.  Results of your cervical culture shows bacterial inflammation (BV) within the vagina, which is NOT a transmitted infection.  It will require treatment only if you have symptoms of vaginal irritation or burning, and your partner will not need treatment.  It is associated frequently with exposure to a perfume or additive to soaps, so I recommend you choose unscented products   I will send a prescription for Metrogel to your pharmacy on file.  Please contact me at the office if you have any questions or concerns.

## 2020-01-10 RX ORDER — CITALOPRAM 10 MG/1
TABLET ORAL
Qty: 30 TABLET | Refills: 0 | Status: SHIPPED | OUTPATIENT
Start: 2020-01-10 | End: 2020-06-30

## 2020-01-22 LAB
FINAL PATHOLOGIC DIAGNOSIS: ABNORMAL
Lab: ABNORMAL

## 2020-01-27 LAB
HPV HR 12 DNA SPEC QL NAA+PROBE: POSITIVE
HPV16 AG SPEC QL: NEGATIVE
HPV18 DNA SPEC QL NAA+PROBE: NEGATIVE

## 2020-01-28 ENCOUNTER — PATIENT MESSAGE (OUTPATIENT)
Dept: OBSTETRICS AND GYNECOLOGY | Facility: CLINIC | Age: 20
End: 2020-01-28

## 2020-01-28 ENCOUNTER — TELEPHONE (OUTPATIENT)
Dept: OBSTETRICS AND GYNECOLOGY | Facility: CLINIC | Age: 20
End: 2020-01-28

## 2020-01-28 NOTE — TELEPHONE ENCOUNTER
----- Message from Michelle Cintron sent at 1/28/2020 12:19 PM CST -----  Contact: KEVIN DUARTE [[id number]]   Name of Who is Calling:KEVIN DUARTE [[id number]]       What is the request in detail:KEVIN DUARTE [[id number]]  Is requesting a call back in regards to test results ...  Please contact to further discuss and advise      Can the clinic reply by [facility]: yes     [facility]:  [phone]

## 2020-02-27 RX ORDER — DEXTROAMPHETAMINE SACCHARATE, AMPHETAMINE ASPARTATE, DEXTROAMPHETAMINE SULFATE AND AMPHETAMINE SULFATE 5; 5; 5; 5 MG/1; MG/1; MG/1; MG/1
1 TABLET ORAL 2 TIMES DAILY
Qty: 60 TABLET | Refills: 0 | Status: SHIPPED | OUTPATIENT
Start: 2020-02-27 | End: 2020-06-30

## 2020-02-27 RX ORDER — METRONIDAZOLE 7.5 MG/G
1 GEL VAGINAL DAILY
Qty: 70 G | Refills: 0 | Status: SHIPPED | OUTPATIENT
Start: 2020-02-27 | End: 2020-03-03

## 2020-02-27 NOTE — TELEPHONE ENCOUNTER
Patient has not been seen in the office in 9 months.  Medication refilled, for 30 days only.  Last prescription was filled in November, 2019.    Please contact patient to schedule return visit with me as soon as possible.

## 2020-04-06 ENCOUNTER — PATIENT MESSAGE (OUTPATIENT)
Dept: GASTROENTEROLOGY | Facility: CLINIC | Age: 20
End: 2020-04-06

## 2020-04-17 ENCOUNTER — PATIENT MESSAGE (OUTPATIENT)
Dept: OBSTETRICS AND GYNECOLOGY | Facility: CLINIC | Age: 20
End: 2020-04-17

## 2020-04-21 ENCOUNTER — PATIENT MESSAGE (OUTPATIENT)
Dept: OBSTETRICS AND GYNECOLOGY | Facility: CLINIC | Age: 20
End: 2020-04-21

## 2020-04-21 ENCOUNTER — PATIENT OUTREACH (OUTPATIENT)
Dept: ADMINISTRATIVE | Facility: OTHER | Age: 20
End: 2020-04-21

## 2020-04-21 ENCOUNTER — OFFICE VISIT (OUTPATIENT)
Dept: OBSTETRICS AND GYNECOLOGY | Facility: CLINIC | Age: 20
End: 2020-04-21
Payer: COMMERCIAL

## 2020-04-21 DIAGNOSIS — N94.10 DYSPAREUNIA IN FEMALE: Primary | ICD-10-CM

## 2020-04-21 DIAGNOSIS — N76.0 RECURRENT VAGINITIS: ICD-10-CM

## 2020-04-21 PROCEDURE — 99213 OFFICE O/P EST LOW 20 MIN: CPT | Mod: 95,,, | Performed by: OBSTETRICS & GYNECOLOGY

## 2020-04-21 PROCEDURE — 99213 PR OFFICE/OUTPT VISIT, EST, LEVL III, 20-29 MIN: ICD-10-PCS | Mod: 95,,, | Performed by: OBSTETRICS & GYNECOLOGY

## 2020-04-21 RX ORDER — METRONIDAZOLE 500 MG/1
500 TABLET ORAL 2 TIMES DAILY
Qty: 10 TABLET | Refills: 1 | Status: SHIPPED | OUTPATIENT
Start: 2020-04-21 | End: 2020-04-26

## 2020-04-21 NOTE — PROGRESS NOTES
The patient location is: HOME  The chief complaint leading to consultation is: DYSPAREUNIA, RECURRENT BV  Visit type: audiovisual  Total time spent with patient: 10 MIN  Each patient to whom he or she provides medical services by telemedicine is:  (1) informed of the relationship between the physician and patient and the respective role of any other health care provider with respect to management of the patient; and (2) notified that he or she may decline to receive medical services by telemedicine and may withdraw from such care at any time.    HISTORY OF PRESENT ILLNESS:    Elvia Carr is a 19 y.o. female, , No LMP recorded.,  presents for a problem visit, complaining of PAINFUL INTERCOURSE OVER THE PAST 8 WEEKS THAT OCCURS WITH INITIAL PENETRATION, AND RECURRENT BV OVER THE PAST 6 MO.  HAS BEEN SEEN BY NP AT [facility] AND REPORTS NEG STD SCREENING.  REVIEWED TOPICAL ALLERGENS AND MAY BE ABLE TO CHANGE SOAP, TAMPON TYPE, LUBRICANT TO MORE HYPOALLERGENIC PRODUCTS.  SX WORSE AFTER MENSES, RESOLVE WITH METROGEL WITHOUT IRRITATION BUT RECUR SOON AFTERWARDS.  IF SX PERSIST AFTER COURSE OF ORAL METRONIDAZOLE, WILL NEED TO BE SEEN PROBLEM VISIT  SENT LIST OF TOPICAL ALLERGENS AND VAGINITIS PREVENTION TO SEE IF SHE CAN IDENTIFY OTHER POSSIBLE IRRITATING CONTACTS.  ONLINE STUDIES DUE TO COVID19 OUTBREAK AND QUARANTINE     LAST 2020:  PAP SUBMITTED, STD SCREEN FOR PoM AND REFILL OCP.  SAYS SHE DOES NOT!! HAVE SLE AND NEVER TOLD RE APLS.  BELLE PRE-VET  LAST VISIT 2018:  WILL RX FRANCES INSTEAD FOR GOOD CYCLE CONTROL   LAST VISIT 10/2017:  TOO EARLY PAP.  HAS HAD BTB WITH NEXPLANON EVERY 8 DAYS LASTING 5 DAYS OVER THE PAST 2 MONTHS COINCIDENT WITH STARTING COLLEGE AT [place], PRE-VET, AND ++ STRESS. PRIOR TO THIS HAS HAD MINIMAL BLEEDING AND CRAMPING HAS RESOLVED WITH NEXPLANON UP TO THIS POINT; UC WAS DIAGNOSED IN April, BUT HAS NOT HAD SIGNIFICANT INCREASE IN GI SYMPTOMS.   TEMPORARY  INCREASE IN CELEXA DOSAGE OVER THE NEXT 5-6 MONTHS AND ADVISED PT TO DISCUSS WITH DR ALLEN PCP.  DISCUSSED STRESS MANAGEMENT TECHNIQUES, AND WILL RX ESTRADIOL 2 MG DAILY IN ADDITION TO HER NEXPLANON IN ATTEMPT TO CONTROL SX.    LAST VISIT 2016:  OTHER THAN BTB - WANTS ANOTHER CONTRACEPTIVE, INTERESTED IN NEXPLANON.  MENSES JUST ENDED.  ALSO HAS CONTINUED RLQ PAIN, WORSE WITH MENSES - WILL F/U USG TO CHECK.  WANTS GARDASIL - ONLY HAD ONE OF PRIOR ORDERED  LAST VISIT 3/2015:  CONT RLQ PAIN, SHE THINKS IS ASSOCIATED WITH HER RIGHT OVARY, WANTS CHECKED - HAS HAPPENED MOST MONTHS OVER THE PAST YEAR, USUALLY WITH MENSES. ALSO INTERESTED IN OCP; WOULD LIKE STD SCREENFOR PEACE OF MIND  LAST VISIT 8/2014:  NEW PATIENT, NEEDS GARDASIL AND REF BY PEDS. MENARCHE AGE 9; MENSES LAST 5 DAYS, 3 ARE HEAVY. OCCAS RIGHT LQ PAIN WITH MENSES, BETTER WITH ADVIL, MIDOL - DISCUSSED STD SCREENING     Past Medical History:   Diagnosis Date    Asthma     UC (ulcerative colitis) 04/2017       Past Surgical History:   Procedure Laterality Date    COLONOSCOPY N/A 4/17/2017    Procedure: COLONOSCOPY;  Surgeon: Drake Arnold MD;  [facility];  Service: Endoscopy;  Laterality: N/A;    COLONOSCOPY N/A 8/23/2019    Procedure: COLONOSCOPY;  Surgeon: Taylor Cook MD;  Location: [facility];  Service: Endoscopy;  Laterality: N/A;    INSERTION OF CONTRACEPTIVE CAPSULE  05/2016    NONE         MEDICATIONS AND ALLERGIES:      Current Outpatient Medications:     albuterol (PROAIR HFA) 90 mcg/actuation inhaler, Inhale 2 puffs into the lungs every 6 (six) hours as needed for Wheezing., Disp: 36 g, Rfl: 5    citalopram (CELEXA) 10 MG tablet, TAKE 1 TABLET(10 MG) BY MOUTH EVERY DAY, Disp: 30 tablet, Rfl: 0    dextroamphetamine-amphetamine (ADDERALL) 20 mg tablet, Take 1 tablet by mouth 2 (two) times daily., Disp: 60 tablet, Rfl: 0    dicyclomine (BENTYL) 20 mg tablet, Take 1 tablet (20 mg total) by mouth 3 (three) times  daily as needed., Disp: 60 tablet, Rfl: 2    drospirenone-ethinyl estradiol (ANNABELLE) 3-0.03 mg per tablet, Take 1 tablet by mouth once daily., Disp: 84 tablet, Rfl: 4    EUCRISA 2 % Oint, , Disp: , Rfl:     gabapentin (NEURONTIN) 300 MG capsule, , Disp: , Rfl:     metroNIDAZOLE (FLAGYL) 500 MG tablet, Take 1 tablet (500 mg total) by mouth 2 (two) times daily. for 5 days, Disp: 10 tablet, Rfl: 1    multivitamin capsule, Take 1 capsule by mouth once daily., Disp: , Rfl:     ondansetron (ZOFRAN-ODT) 4 MG TbDL, Take 1 tablet (4 mg total) by mouth every 8 (eight) hours as needed., Disp: 15 tablet, Rfl: 0    traMADol (ULTRAM) 50 mg tablet, , Disp: , Rfl:     Review of patient's allergies indicates:  No Known Allergies    Family History   Problem Relation Age of Onset    Osteoarthritis Mother     Migraines Mother     Heart failure Maternal Grandfather     Breast cancer Neg Hx     Colon cancer Neg Hx     Ovarian cancer Neg Hx        Social History     Socioeconomic History    Marital status: Single     Spouse name: Not on file    Number of children: 0    Years of education: Not on file    Highest education level: Not on file   Occupational History    Occupation: student   Social Needs    Financial resource strain: Not on file    Food insecurity:     Worry: Not on file     Inability: Not on file    Transportation needs:     Medical: Not on file     Non-medical: Not on file   Tobacco Use    Smoking status: Never Smoker    Smokeless tobacco: Never Used   Substance and Sexual Activity    Alcohol use: Yes     Alcohol/week: 1.0 - 2.0 standard drinks     Types: 1 - 2 Shots of liquor per week    Drug use: No    Sexual activity: Yes     Partners: Male     Birth control/protection: None, Implant   Lifestyle    Physical activity:     Days per week: Not on file     Minutes per session: Not on file    Stress: Not on file   Relationships    Social connections:     Talks on phone: Not on file     Gets together:  Not on file     Attends Jehovah's witness service: Not on file     Active member of club or organization: Not on file     Attends meetings of clubs or organizations: Not on file     Relationship status: Not on file   Other Topics Concern    Not on file   Social History Narrative    Exercises regularly (3-5 days weekly): resistance/cardio.      Diet is fair/good.      Satisfied with weight.    She does drink at least 1/2 gallon water daily.    She drinks 0 coffee/tea/caffeine-containing soft drinks daily.    Total sleep time at night is 7 hours.    She does wear seat belts.    Hobbies include none.       COMPREHENSIVE GYN HISTORY:  PAP History: Denies abnormal Paps.  Infection History: Denies STDs. Denies PID.  Benign History: Denies uterine fibroids. Denies ovarian cysts. Denies endometriosis. Denies other conditions.  Cancer History: Denies cervical cancer. Denies uterine cancer or hyperplasia. Denies ovarian cancer. Denies vulvar cancer or pre-cancer. Denies vaginal cancer or pre-cancer. Denies breast cancer. Denies colon cancer.    ROS:  GENERAL: No weight changes. No swelling. No fatigue. No fever.  CARDIOVASCULAR: No chest pain. No shortness of breath. No leg cramps.   NEUROLOGICAL: No headaches. No vision changes.  BREASTS: No pain. No lumps. No discharge.  ABDOMEN: No pain. No nausea. No vomiting. No diarrhea. No constipation.  REPRODUCTIVE: No abnormal bleeding.   VULVA:  No lesions. No itching.  URINARY: No incontinence. No nocturia. No frequency. No dysuria.    There were no vitals taken for this visit.    PE:  APPEARANCE: Well nourished, well developed, in no acute distress.  AFFECT APPROPRIATE  BREATHING UNLABORED AND APPEARS WELL    PROCEDURES:    DIAGNOSIS:  1. Dyspareunia in female     2. Recurrent vaginitis         LABS AND TESTS ORDERED:    MEDICATIONS PRESCRIBED:    COUNSELING:  The patient was counseled on steps that she can take to avoid vulvar irritation and reduce contact with topical  allergens:    Clothing and Laundry  ? Wear all-white cotton underwear.   ? Do not wear pantyhose (wear thigh high or knee high hose instead).   ? Wear loose-fitting pants or skirts.   ? Remove wet bathing suits and exercise clothing promptly.   ? Use hypoallergenic, dermatologically approved detergent such as Tide Free, or All Free and Clear.  ? Double-rinse underwear and any other clothing that comes into contact with the vulva.   ? Do not use fabric softener on undergarments.  Hygiene  ? Use soft, white, unscented toilet paper.   ? Use lukewarm or cool sitz baths to relieve burning and irritation.   ? Avoid getting shampoo on the vulvar area.   ? Do not use bubble bath, feminine hygiene products, or any perfumed creams or soaps.   ? Wash the vulva with cool to lukewarm water only.  ? Rinse the vulva with water after urination.  ? Urinate before the bladder is full.   ? Prevent constipation by adding fiber to your diet (if necessary, use a psyllium product such as Metamucil) and drinking at least 8 glasses of water daily.  ? Use unscented menstrual pads and tampons. Do not wear panty liners daily.  Sexual intercourse  ? Use a lubricant that is water soluble, e.g., Astroglide or KY and unscented.  ? Ask your physician for a prescription for a topical anesthestic, e.g., Lidocaine gel 5%. (This may sting for the first 3-5 minutes after application.)  ? Apply ice or a frozen blue gel pack (lunch box size) wrapped in one layer of a hand towel to relieve burning after intercourse.   ? Urinate (to prevent infection) and rinse vulva with cool water after sexual intercourse.  ? Do not use contraceptive creams or spermicides.  Physical Activities  ? Avoid exercises that put direct pressure on the vulva such as bicycle riding and horseback riding.  ? Limit intense exercises that create a lot of friction in the vulvar area (try lower intensity exercises such as walking).  ? Use a frozen gel pack wrapped in a towel to relieve  symptoms after exercise.   ? Enroll in an exercise class such as yoga to learn stretching and relaxation exercises.  ? Don't swim in highly chlorinated pools.   ? Avoid the use of hot tubs.      FOLLOW-UP with me routine visit.

## 2020-05-04 ENCOUNTER — PATIENT MESSAGE (OUTPATIENT)
Dept: OBSTETRICS AND GYNECOLOGY | Facility: CLINIC | Age: 20
End: 2020-05-04

## 2020-05-19 ENCOUNTER — PATIENT OUTREACH (OUTPATIENT)
Dept: ADMINISTRATIVE | Facility: OTHER | Age: 20
End: 2020-05-19

## 2020-05-20 ENCOUNTER — OFFICE VISIT (OUTPATIENT)
Dept: OBSTETRICS AND GYNECOLOGY | Facility: CLINIC | Age: 20
End: 2020-05-20
Payer: COMMERCIAL

## 2020-05-20 VITALS
SYSTOLIC BLOOD PRESSURE: 122 MMHG | BODY MASS INDEX: 24.36 KG/M2 | WEIGHT: 146.19 LBS | HEIGHT: 65 IN | DIASTOLIC BLOOD PRESSURE: 82 MMHG

## 2020-05-20 DIAGNOSIS — N76.0 RECURRENT VAGINITIS: Primary | ICD-10-CM

## 2020-05-20 DIAGNOSIS — N90.89 FISSURE OF VULVA: ICD-10-CM

## 2020-05-20 PROCEDURE — 99213 OFFICE O/P EST LOW 20 MIN: CPT | Mod: S$GLB,,, | Performed by: OBSTETRICS & GYNECOLOGY

## 2020-05-20 PROCEDURE — 87661 TRICHOMONAS VAGINALIS AMPLIF: CPT

## 2020-05-20 PROCEDURE — 3008F BODY MASS INDEX DOCD: CPT | Mod: CPTII,S$GLB,, | Performed by: OBSTETRICS & GYNECOLOGY

## 2020-05-20 PROCEDURE — 99213 PR OFFICE/OUTPT VISIT, EST, LEVL III, 20-29 MIN: ICD-10-PCS | Mod: S$GLB,,, | Performed by: OBSTETRICS & GYNECOLOGY

## 2020-05-20 PROCEDURE — 99999 PR PBB SHADOW E&M-EST. PATIENT-LVL III: CPT | Mod: PBBFAC,,, | Performed by: OBSTETRICS & GYNECOLOGY

## 2020-05-20 PROCEDURE — 87481 CANDIDA DNA AMP PROBE: CPT | Mod: 59

## 2020-05-20 PROCEDURE — 3008F PR BODY MASS INDEX (BMI) DOCUMENTED: ICD-10-PCS | Mod: CPTII,S$GLB,, | Performed by: OBSTETRICS & GYNECOLOGY

## 2020-05-20 PROCEDURE — 99999 PR PBB SHADOW E&M-EST. PATIENT-LVL III: ICD-10-PCS | Mod: PBBFAC,,, | Performed by: OBSTETRICS & GYNECOLOGY

## 2020-05-20 RX ORDER — BETAMETHASONE VALERATE 1.2 MG/G
CREAM TOPICAL NIGHTLY
Qty: 45 G | Refills: 1 | Status: SHIPPED | OUTPATIENT
Start: 2020-05-20 | End: 2020-06-30

## 2020-05-20 NOTE — PROGRESS NOTES
HISTORY OF PRESENT ILLNESS:    Elvia Carr is a 19 y.o. female, , No LMP recorded.,  presents for a problem visit, complaining of DYSPAREUNIA, HAD RELIEF OF ITCHING S/P RECENT COURSE OF METRONIDAZOLE AND ELIMINATION OF TOPICAL ALLERGENS, BUT HAS SHARP PAIN WITH PENETRATION IN OPENING OF THE VAGINA THAT REMAINS.  ON EXAM SMALL FISSURE IN THE SKIN POSTERIORLY WITH SOME SURROUNDING ERYTHEMA, AND SOME WHITENING LATERALLY WITHIN THE VESTIBULE.  AFFIRM SUBMITTED AND WILL RX 2 WEEKS NIGHTLY VALISONE, APPLYING ZINC OXIDE TO AREAS THAT ARE PARTICULARLY SENSITIVE WHEN TOUCHED   HX OF RECURRENT BACTERIAL INFECTIONS, 2019 WAS SEEN BY [facility], NEG FUNGAL CULTURE AND RXN CLEOCIN VAG CREAM    LAST VISIT VIRTUAL 2020:  complaining of PAINFUL INTERCOURSE OVER THE PAST 8 WEEKS THAT OCCURS WITH INITIAL PENETRATION, AND RECURRENT BV OVER THE PAST 6 MO.  HAS BEEN SEEN BY NP AT [facility] AND REPORTS NEG STD SCREENING.  REVIEWED TOPICAL ALLERGENS AND MAY BE ABLE TO CHANGE SOAP, TAMPON TYPE, LUBRICANT TO MORE HYPOALLERGENIC PRODUCTS.  SX WORSE AFTER MENSES, RESOLVE WITH METROGEL WITHOUT IRRITATION BUT RECUR SOON AFTERWARDS.  IF SX PERSIST AFTER COURSE OF ORAL METRONIDAZOLE, WILL NEED TO BE SEEN PROBLEM VISIT  SENT LIST OF TOPICAL ALLERGENS AND VAGINITIS PREVENTION TO SEE IF SHE CAN IDENTIFY OTHER POSSIBLE IRRITATING CONTACTS.  ONLINE STUDIES DUE TO COVID19 OUTBREAK AND QUARANTINE   LAST 2020:  PAP SUBMITTED, STD SCREEN FOR PoM AND REFILL OCP.  SAYS SHE DOES NOT!! HAVE SLE AND NEVER TOLD RE APLS.  BELLE PRE-VET  LAST VISIT 2018:  WILL RX FRANCES INSTEAD FOR GOOD CYCLE CONTROL   LAST VISIT 10/2017:  TOO EARLY PAP.  HAS HAD BTB WITH NEXPLANON EVERY 8 DAYS LASTING 5 DAYS OVER THE PAST 2 MONTHS COINCIDENT WITH STARTING COLLEGE AT [employer], PRE-VET, AND ++ STRESS. PRIOR TO THIS HAS HAD MINIMAL BLEEDING AND CRAMPING HAS RESOLVED WITH NEXPLANON UP TO THIS POINT; UC WAS DIAGNOSED IN April, BUT HAS NOT  HAD SIGNIFICANT INCREASE IN GI SYMPTOMS.   TEMPORARY INCREASE IN CELEXA DOSAGE OVER THE NEXT 5-6 MONTHS AND ADVISED PT TO DISCUSS WITH DR ALLEN PCP.  DISCUSSED STRESS MANAGEMENT TECHNIQUES, AND WILL RX ESTRADIOL 2 MG DAILY IN ADDITION TO HER NEXPLANON IN ATTEMPT TO CONTROL SX.    LAST VISIT 2016:  OTHER THAN BTB - WANTS ANOTHER CONTRACEPTIVE, INTERESTED IN NEXPLANON.  MENSES JUST ENDED.  ALSO HAS CONTINUED RLQ PAIN, WORSE WITH MENSES - WILL F/U USG TO CHECK.  WANTS GARDASIL - ONLY HAD ONE OF PRIOR ORDERED  LAST VISIT 3/2015:  CONT RLQ PAIN, SHE THINKS IS ASSOCIATED WITH HER RIGHT OVARY, WANTS CHECKED - HAS HAPPENED MOST MONTHS OVER THE PAST YEAR, USUALLY WITH MENSES. ALSO INTERESTED IN OCP; WOULD LIKE STD SCREENFOR PEACE OF MIND  LAST VISIT 8/2014:  NEW PATIENT, NEEDS GARDASIL AND REF BY PEDS. MENARCHE AGE 9; MENSES LAST 5 DAYS, 3 ARE HEAVY. OCCAS RIGHT LQ PAIN WITH MENSES, BETTER WITH ADVIL, MIDOL - DISCUSSED STD SCREENING     Past Medical History:   Diagnosis Date    Asthma     UC (ulcerative colitis) 04/2017       Past Surgical History:   Procedure Laterality Date    COLONOSCOPY N/A 4/17/2017    Procedure: COLONOSCOPY;  Surgeon: Drake Arnold MD;  Location: [facility] ENDO;  Service: Endoscopy;  Laterality: N/A;    COLONOSCOPY N/A 8/23/2019    Procedure: COLONOSCOPY;  Surgeon: Taylor Cook MD;  Location: [facility] ENDO;  Service: Endoscopy;  Laterality: N/A;    INSERTION OF CONTRACEPTIVE CAPSULE  05/2016    NONE         MEDICATIONS AND ALLERGIES:      Current Outpatient Medications:     albuterol (PROAIR HFA) 90 mcg/actuation inhaler, Inhale 2 puffs into the lungs every 6 (six) hours as needed for Wheezing., Disp: 36 g, Rfl: 5    citalopram (CELEXA) 10 MG tablet, TAKE 1 TABLET(10 MG) BY MOUTH EVERY DAY, Disp: 30 tablet, Rfl: 0    dextroamphetamine-amphetamine (ADDERALL) 20 mg tablet, Take 1 tablet by mouth 2 (two) times daily., Disp: 60 tablet, Rfl: 0    dicyclomine (BENTYL) 20 mg tablet,  Take 1 tablet (20 mg total) by mouth 3 (three) times daily as needed., Disp: 60 tablet, Rfl: 2    drospirenone-ethinyl estradiol (ANNABELLE) 3-0.03 mg per tablet, Take 1 tablet by mouth once daily., Disp: 84 tablet, Rfl: 4    EUCRISA 2 % Oint, , Disp: , Rfl:     gabapentin (NEURONTIN) 300 MG capsule, , Disp: , Rfl:     multivitamin capsule, Take 1 capsule by mouth once daily., Disp: , Rfl:     ondansetron (ZOFRAN-ODT) 4 MG TbDL, Take 1 tablet (4 mg total) by mouth every 8 (eight) hours as needed., Disp: 15 tablet, Rfl: 0    traMADol (ULTRAM) 50 mg tablet, , Disp: , Rfl:     Review of patient's allergies indicates:  No Known Allergies    Family History   Problem Relation Age of Onset    Osteoarthritis Mother     Migraines Mother     Heart failure Maternal Grandfather     Breast cancer Neg Hx     Colon cancer Neg Hx     Ovarian cancer Neg Hx        Social History     Socioeconomic History    Marital status: Single     Spouse name: Not on file    Number of children: 0    Years of education: Not on file    Highest education level: Not on file   Occupational History    Occupation: student   Social Needs    Financial resource strain: Not on file    Food insecurity:     Worry: Not on file     Inability: Not on file    Transportation needs:     Medical: Not on file     Non-medical: Not on file   Tobacco Use    Smoking status: Never Smoker    Smokeless tobacco: Never Used   Substance and Sexual Activity    Alcohol use: Yes     Alcohol/week: 1.0 - 2.0 standard drinks     Types: 1 - 2 Shots of liquor per week    Drug use: No    Sexual activity: Yes     Partners: Male     Birth control/protection: None, Implant   Lifestyle    Physical activity:     Days per week: Not on file     Minutes per session: Not on file    Stress: Not on file   Relationships    Social connections:     Talks on phone: Not on file     Gets together: Not on file     Attends Hinduism service: Not on file     Active member of club or  organization: Not on file     Attends meetings of clubs or organizations: Not on file     Relationship status: Not on file   Other Topics Concern    Not on file   Social History Narrative    Exercises regularly (3-5 days weekly): resistance/cardio.      Diet is fair/good.      Satisfied with weight.    She does drink at least 1/2 gallon water daily.    She drinks 0 coffee/tea/caffeine-containing soft drinks daily.    Total sleep time at night is 7 hours.    She does wear seat belts.    Hobbies include none.       COMPREHENSIVE GYN HISTORY:  PAP History: Denies abnormal Paps.  Infection History: Denies STDs. Denies PID.  Benign History: Denies uterine fibroids. Denies ovarian cysts. Denies endometriosis. Denies other conditions.  Cancer History: Denies cervical cancer. Denies uterine cancer or hyperplasia. Denies ovarian cancer. Denies vulvar cancer or pre-cancer. Denies vaginal cancer or pre-cancer. Denies breast cancer. Denies colon cancer.    ROS:  GENERAL: No weight changes. No swelling. No fatigue. No fever.  CARDIOVASCULAR: No chest pain. No shortness of breath. No leg cramps.   NEUROLOGICAL: No headaches. No vision changes.  BREASTS: No pain. No lumps. No discharge.  ABDOMEN: No pain. No nausea. No vomiting. No diarrhea. No constipation.  REPRODUCTIVE: No abnormal bleeding.   VAGINA: No relaxation.   URINARY: No incontinence. No nocturia. No frequency. No dysuria.    There were no vitals taken for this visit.    PE:  APPEARANCE: Well nourished, well developed, in no acute distress.  ABDOMEN: Soft. No tenderness or masses. No hepatosplenomegaly. No hernias.  BREASTS, FUNDOSCOPIC, RECTAL DEFERRED  PELVIC: External female genitalia with SMALL FISSURE IN THE SKIN POSTERIORLY WITH SOME SURROUNDING ERYTHEMA, AND SOME WHITENING LATERALLY WITHIN THE VESTIBULE.  Female hair distribution. Adequate perineal body, Normal urethral meatus. Vagina moist and well rugated with SOME THICK YELLOW MUCUS discharge.  No  significant cystocele or rectocele present. Cervix pink without lesions, discharge or tenderness. Uterus is normal size, regular, mobile and nontender. Adnexa without masses or tenderness.  EXTREMITIES: No edema    PROCEDURES:    DIAGNOSIS:  No diagnosis found.    LABS AND TESTS ORDERED:    MEDICATIONS PRESCRIBED:    COUNSELING:   The patient was counseled AS ABOVE    FOLLOW-UP with me routine visit.       Answers for HPI/ROS submitted by the patient on 2020   Vaginal pain  Chronicity: new  Onset: more than 1 month ago  Frequency: intermittently  Progression since onset: waxing and waning  Pain severity: severe  Affected side: left  Pregnant now?: No  abdominal pain: No  anorexia: No  chills: No  discolored urine: No  dysuria: No  fever: No  frequency: No  nausea: No  painful intercourse: Yes  rash: No  urgency: No  vomiting: No  Please select the characteristics of your discharge: : normal  Aggravated by: activity, intercourse  treatments tried: antibiotics, anti-fungal cream, warm bath  Improvement on treatment: mild  Sexual activity: sexually active  Partner with STD symptoms: no  Birth control: oral contraceptives  Menstrual history: regular  STD: No  abdominal surgery: No   section: No  Ectopic pregnancy: No  Endometriosis: No  herpes simplex: No  gynecological surgery: No  menorrhagia: Yes  metrorrhagia: No  miscarriage: No  ovarian cysts: Yes  perineal abscess: No  PID: No  terminated pregnancy: No  vaginosis: Yes

## 2020-05-24 ENCOUNTER — PATIENT MESSAGE (OUTPATIENT)
Dept: OBSTETRICS AND GYNECOLOGY | Facility: CLINIC | Age: 20
End: 2020-05-24

## 2020-05-24 RX ORDER — FLUCONAZOLE 150 MG/1
150 TABLET ORAL ONCE
Qty: 1 TABLET | Refills: 1 | Status: SHIPPED | OUTPATIENT
Start: 2020-05-24 | End: 2020-05-24

## 2020-05-25 NOTE — TELEPHONE ENCOUNTER
Results of your cervical culture shows yeast within the vagina but no evidence of a transmitted infection.  It will require treatment only if you have symptoms of vaginal irritation or burning, and your partner will not need treatment.  It is associated frequently with exposure to a perfume or additive to soaps, so I recommend you continue to choose unscented products   I will send a prescription for Diflucan to your pharmacy on file, but you might consider starting with Monistat 3 suppositories for faster, more directed treatment.  Please contact me at the office if you have any questions or concerns.

## 2020-06-23 ENCOUNTER — TELEPHONE (OUTPATIENT)
Dept: OBSTETRICS AND GYNECOLOGY | Facility: CLINIC | Age: 20
End: 2020-06-23

## 2020-06-23 ENCOUNTER — PATIENT MESSAGE (OUTPATIENT)
Dept: OBSTETRICS AND GYNECOLOGY | Facility: CLINIC | Age: 20
End: 2020-06-23

## 2020-06-25 ENCOUNTER — PATIENT MESSAGE (OUTPATIENT)
Dept: FAMILY MEDICINE | Facility: CLINIC | Age: 20
End: 2020-06-25

## 2020-06-25 DIAGNOSIS — Z03.818 ENCOUNTER FOR OBSERVATION FOR SUSPECTED EXPOSURE TO OTHER BIOLOGICAL AGENTS RULED OUT: ICD-10-CM

## 2020-06-25 DIAGNOSIS — R06.7 SNEEZING: ICD-10-CM

## 2020-06-30 ENCOUNTER — OFFICE VISIT (OUTPATIENT)
Dept: OBSTETRICS AND GYNECOLOGY | Facility: CLINIC | Age: 20
End: 2020-06-30
Attending: OBSTETRICS & GYNECOLOGY
Payer: COMMERCIAL

## 2020-06-30 ENCOUNTER — LAB VISIT (OUTPATIENT)
Dept: INTERNAL MEDICINE | Facility: CLINIC | Age: 20
End: 2020-06-30
Payer: COMMERCIAL

## 2020-06-30 VITALS
WEIGHT: 151.88 LBS | DIASTOLIC BLOOD PRESSURE: 80 MMHG | BODY MASS INDEX: 25.93 KG/M2 | SYSTOLIC BLOOD PRESSURE: 120 MMHG | HEIGHT: 64 IN

## 2020-06-30 DIAGNOSIS — N76.0 RECURRENT VAGINITIS: Primary | ICD-10-CM

## 2020-06-30 DIAGNOSIS — K51.211 ULCERATIVE PROCTITIS WITH RECTAL BLEEDING: ICD-10-CM

## 2020-06-30 DIAGNOSIS — R06.7 SNEEZING: ICD-10-CM

## 2020-06-30 DIAGNOSIS — L29.2 VULVOVAGINAL ITCHING: ICD-10-CM

## 2020-06-30 DIAGNOSIS — Z03.818 ENCOUNTER FOR OBSERVATION FOR SUSPECTED EXPOSURE TO OTHER BIOLOGICAL AGENTS RULED OUT: ICD-10-CM

## 2020-06-30 DIAGNOSIS — N94.10 DYSPAREUNIA IN FEMALE: ICD-10-CM

## 2020-06-30 DIAGNOSIS — K51.013 ULCERATIVE PANCOLITIS WITH FISTULA: ICD-10-CM

## 2020-06-30 DIAGNOSIS — N90.89 FISSURE OF VULVA: ICD-10-CM

## 2020-06-30 PROCEDURE — 99999 PR PBB SHADOW E&M-EST. PATIENT-LVL III: CPT | Mod: PBBFAC,,, | Performed by: OBSTETRICS & GYNECOLOGY

## 2020-06-30 PROCEDURE — 99214 OFFICE O/P EST MOD 30 MIN: CPT | Mod: S$GLB,,, | Performed by: OBSTETRICS & GYNECOLOGY

## 2020-06-30 PROCEDURE — 99214 PR OFFICE/OUTPT VISIT, EST, LEVL IV, 30-39 MIN: ICD-10-PCS | Mod: S$GLB,,, | Performed by: OBSTETRICS & GYNECOLOGY

## 2020-06-30 PROCEDURE — 3008F PR BODY MASS INDEX (BMI) DOCUMENTED: ICD-10-PCS | Mod: CPTII,S$GLB,, | Performed by: OBSTETRICS & GYNECOLOGY

## 2020-06-30 PROCEDURE — 99999 PR PBB SHADOW E&M-EST. PATIENT-LVL III: ICD-10-PCS | Mod: PBBFAC,,, | Performed by: OBSTETRICS & GYNECOLOGY

## 2020-06-30 PROCEDURE — 3008F BODY MASS INDEX DOCD: CPT | Mod: CPTII,S$GLB,, | Performed by: OBSTETRICS & GYNECOLOGY

## 2020-06-30 PROCEDURE — 87102 FUNGUS ISOLATION CULTURE: CPT

## 2020-06-30 PROCEDURE — 87106 FUNGI IDENTIFICATION YEAST: CPT

## 2020-06-30 PROCEDURE — 87210 SMEAR WET MOUNT SALINE/INK: CPT | Mod: QW,S$GLB,, | Performed by: OBSTETRICS & GYNECOLOGY

## 2020-06-30 PROCEDURE — 87210 PR  SMEAR,STAIN,WET MNT,INTERP: ICD-10-PCS | Mod: QW,S$GLB,, | Performed by: OBSTETRICS & GYNECOLOGY

## 2020-06-30 PROCEDURE — U0003 INFECTIOUS AGENT DETECTION BY NUCLEIC ACID (DNA OR RNA); SEVERE ACUTE RESPIRATORY SYNDROME CORONAVIRUS 2 (SARS-COV-2) (CORONAVIRUS DISEASE [COVID-19]), AMPLIFIED PROBE TECHNIQUE, MAKING USE OF HIGH THROUGHPUT TECHNOLOGIES AS DESCRIBED BY CMS-2020-01-R: HCPCS

## 2020-06-30 RX ORDER — FLUOXETINE HYDROCHLORIDE 20 MG/1
CAPSULE ORAL
COMMUNITY
Start: 2020-06-26 | End: 2020-12-08

## 2020-06-30 RX ORDER — FLUCONAZOLE 200 MG/1
TABLET ORAL
Qty: 3 TABLET | Refills: 0 | Status: SHIPPED | OUTPATIENT
Start: 2020-06-30 | End: 2020-08-05

## 2020-06-30 RX ORDER — BETAMETHASONE VALERATE 1.2 MG/G
OINTMENT TOPICAL
Qty: 15 G | Refills: 1 | Status: SHIPPED | OUTPATIENT
Start: 2020-06-30 | End: 2020-07-01 | Stop reason: SDUPTHER

## 2020-06-30 NOTE — PROGRESS NOTES
Subjective:     Patient ID: Elvia Carr is a 19 y.o. female.     Chief Complaint: Vaginal Discharge, Vulvar Itch, and Dyspareunia     History of Present Illness: This patient is a 19 y.o. female, who presents to the GYN [facility] for evaluation of recurrent vulvovaginal irritation itching patient is on Jenifer for contraception her wet prep 1 year ago was negative for BV.  In May of 2020 she was treated for yeast and was given betamethasone ointment.  Recently, she has developed vulvovaginal irritation and dyspareunia dyspareunia.    Patient's last menstrual period was 06/05/2020 (approximate).    Review of Systems    GENERAL: No fever, chills, fatigability or weightchange  SKIN: No rashes, itching or changes in color or texture of skin.  HEAD: No headaches or recent head trauma.  EYES: Visual acuity fine. No photophobia,r diplopia.  EARS: Denies earache or vertigo  NOSE: No loss of smell, no epistaxis or postnasal drip.  MOUTH & THROAT: No hoarseness or change in voice.   NODES: Denies swollen glands.  CHEST: Denies WOLFF, cyanosis, wheezing, cough and sputum production.  CARDIOVASCULAR: Denies chest pain, PND, orthopnea or reduced exercise tolerance.  ABDOMEN: Appetite fine. No weight loss. bloating, Denies diarrhea, abdominal pain, hematemesis or blood in stool.  URINARY: No flank pain, dysuria or hematuria.  PERIPHERAL VASCULAR: No claudication or cyanosis.Varicosities  MUSCULOSKELETAL: No joint stiffness or swelling. Denies back pain.muscle aches  NEUROLOGIC: No history of seizures, paralysis, alteration of gait or coordination.       Objective:       Physical Exam     APPEARANCE: Well nourished, well developed, in no acute distress.    GENITOURINARY:  Vulva: No lesions. Normal female genital architecture.   Urethral Meatus: Normal size and location, no lesions, no prolapse.  Urethra: No masses, tenderness, prolapse or scarring.  Vagina:  Moist with rugae, thick, white discharge, no significant  cystocele or rectocele.  Cervix: No lesions, normal diameter, no stenosis, no cervical motion tenderness. .  Uterus: 6 week size, regular shape, mobile, non-tender, normal position, good support.  Adnexa: No masses, tenderness or CDS nodularity.  Anus Perineum: No lesions, no relaxation, no external hemorrhoids.  Abdomen: No masses, tenderness, hernia or ascites, no hepatasplenomegaly  Skin: No rashes, lesions, ulcers, acne, hirsutism.  Peripheral/lower extremities: No edema, erythema or tenderness.  Lymphatic: No axillary, neck or groin nodes palp.  Mental Status: Alert, oriented x 3, normal affect and mood.          @PROCEDURE:@  Wet Prep:  pH = 4.0  -WBCS = occasional  -Lactobacilli = present  -BV = Amsel negative  -Candida = Hyphae multiple hyphae noted.  -Trichomnas = none seen  -Cells- Basal and Parabasal, Superficial: Maturation:  Superficial cells predominate  -Impression:  Candida vulvovaginitis  -Treatment:  Betamethasone ointment, Diflucan 200 mg strength capsules 1 every 3rd day for 3 doses, boric acid 650 mg suppositories 1 nightly 3 weeks  -[facility] in 4 weeks         Assessment:      1. Recurrent vaginitis    2. Fissure of vulva    3. Dyspareunia in female    4. Vulvovaginal itching    5. Ulcerative proctitis with rectal bleeding    6. Ulcerative pancolitis               Plan:  1.  Candida vaginal culture taken today.  2.  Treatment will be Betamethasone ointment, Diflucan 200 mg strength capsules 1 every 3rd day for 3 doses, boric acid 650 mg suppositories 1 nightly 3 weeks  3.  Return to clinic in 4 weeks.                  No orders of the defined types were placed in this encounter.

## 2020-07-01 ENCOUNTER — OFFICE VISIT (OUTPATIENT)
Dept: DERMATOLOGY | Facility: CLINIC | Age: 20
End: 2020-07-01
Payer: COMMERCIAL

## 2020-07-01 DIAGNOSIS — N76.0 RECURRENT VAGINITIS: ICD-10-CM

## 2020-07-01 DIAGNOSIS — N90.89 FISSURE OF VULVA: ICD-10-CM

## 2020-07-01 DIAGNOSIS — L29.2 VULVOVAGINAL ITCHING: ICD-10-CM

## 2020-07-01 DIAGNOSIS — K51.013 ULCERATIVE PANCOLITIS WITH FISTULA: ICD-10-CM

## 2020-07-01 DIAGNOSIS — N94.10 DYSPAREUNIA IN FEMALE: ICD-10-CM

## 2020-07-01 DIAGNOSIS — K51.211 ULCERATIVE PROCTITIS WITH RECTAL BLEEDING: ICD-10-CM

## 2020-07-01 LAB — SARS-COV-2 RNA RESP QL NAA+PROBE: NOT DETECTED

## 2020-07-01 PROCEDURE — 99202 OFFICE O/P NEW SF 15 MIN: CPT | Mod: 95,,, | Performed by: STUDENT IN AN ORGANIZED HEALTH CARE EDUCATION/TRAINING PROGRAM

## 2020-07-01 PROCEDURE — 99202 PR OFFICE/OUTPT VISIT, NEW, LEVL II, 15-29 MIN: ICD-10-PCS | Mod: 95,,, | Performed by: STUDENT IN AN ORGANIZED HEALTH CARE EDUCATION/TRAINING PROGRAM

## 2020-07-01 RX ORDER — BETAMETHASONE VALERATE 1.2 MG/G
OINTMENT TOPICAL
Qty: 45 G | Refills: 1 | Status: SHIPPED | OUTPATIENT
Start: 2020-07-01 | End: 2020-12-01

## 2020-07-01 NOTE — PROGRESS NOTES
The patient location is: home  The chief complaint leading to consultation is: hand eczema    Visit type: audiovisual    Face to Face time with patient: 15mins  15 minutes of total time spent on the encounter, which includes face to face time and non-face to face time preparing to see the patient (eg, review of tests), Obtaining and/or reviewing separately obtained history, Documenting clinical information in the electronic or other health record, Independently interpreting results (not separately reported) and communicating results to the patient/family/caregiver, or Care coordination (not separately reported).         Each patient to whom he or she provides medical services by telemedicine is:  (1) informed of the relationship between the physician and patient and the respective role of any other health care provider with respect to management of the patient; and (2) notified that he or she may decline to receive medical services by telemedicine and may withdraw from such care at any time.    Notes: Patient presents for evaluation of hand eczema/dyshidrotic eczema. Previously seen by outside dermatologist and treated with Eucrisa for past 1 year. Initially symptoms were controlled, but now with persistent blisters, redness, itching, and peeling of the hands. Not currently using anything for her symptoms. Admits to frequently washing hands.     ROS: Otherwise negative     PE: Const/Neuro - AAOX3, NAD            Skin - vesicles, erythema, and fissuring noted of the lateral and medial aspects of fingers of both hands.     A/P: 1) Dyshidrotic eczema - will try a course of betamethasone 0.1% ointment to the affected areas on the hands. Can alternate with Eucrisa ointment. Counseled patient on gentle skin care regimen, including need for sensitive soaps/detergents, as well as need for frequent use of sensitize moisturizers.

## 2020-07-23 RX ORDER — DEXTROAMPHETAMINE SACCHARATE, AMPHETAMINE ASPARTATE, DEXTROAMPHETAMINE SULFATE AND AMPHETAMINE SULFATE 5; 5; 5; 5 MG/1; MG/1; MG/1; MG/1
1 TABLET ORAL 2 TIMES DAILY
Qty: 60 TABLET | Refills: 0 | OUTPATIENT
Start: 2020-07-23 | End: 2020-08-22

## 2020-07-28 ENCOUNTER — OFFICE VISIT (OUTPATIENT)
Dept: OBSTETRICS AND GYNECOLOGY | Facility: CLINIC | Age: 20
End: 2020-07-28
Attending: OBSTETRICS & GYNECOLOGY
Payer: COMMERCIAL

## 2020-07-28 VITALS
SYSTOLIC BLOOD PRESSURE: 124 MMHG | BODY MASS INDEX: 26.2 KG/M2 | WEIGHT: 153.44 LBS | DIASTOLIC BLOOD PRESSURE: 70 MMHG | HEIGHT: 64 IN

## 2020-07-28 DIAGNOSIS — K58.9 IRRITABLE BOWEL SYNDROME, UNSPECIFIED TYPE: ICD-10-CM

## 2020-07-28 DIAGNOSIS — B37.31 CANDIDAL VULVOVAGINITIS: Primary | ICD-10-CM

## 2020-07-28 DIAGNOSIS — N76.0 RECURRENT VAGINITIS: ICD-10-CM

## 2020-07-28 DIAGNOSIS — N94.10 DYSPAREUNIA IN FEMALE: ICD-10-CM

## 2020-07-28 DIAGNOSIS — L29.2 VULVOVAGINAL ITCHING: ICD-10-CM

## 2020-07-28 PROCEDURE — 87210 PR  SMEAR,STAIN,WET MNT,INTERP: ICD-10-PCS | Mod: QW,S$GLB,, | Performed by: OBSTETRICS & GYNECOLOGY

## 2020-07-28 PROCEDURE — 3008F PR BODY MASS INDEX (BMI) DOCUMENTED: ICD-10-PCS | Mod: CPTII,S$GLB,, | Performed by: OBSTETRICS & GYNECOLOGY

## 2020-07-28 PROCEDURE — 99214 PR OFFICE/OUTPT VISIT, EST, LEVL IV, 30-39 MIN: ICD-10-PCS | Mod: S$GLB,,, | Performed by: OBSTETRICS & GYNECOLOGY

## 2020-07-28 PROCEDURE — 87102 FUNGUS ISOLATION CULTURE: CPT

## 2020-07-28 PROCEDURE — 99999 PR PBB SHADOW E&M-EST. PATIENT-LVL III: CPT | Mod: PBBFAC,,, | Performed by: OBSTETRICS & GYNECOLOGY

## 2020-07-28 PROCEDURE — 87210 SMEAR WET MOUNT SALINE/INK: CPT | Mod: QW,S$GLB,, | Performed by: OBSTETRICS & GYNECOLOGY

## 2020-07-28 PROCEDURE — 99999 PR PBB SHADOW E&M-EST. PATIENT-LVL III: ICD-10-PCS | Mod: PBBFAC,,, | Performed by: OBSTETRICS & GYNECOLOGY

## 2020-07-28 PROCEDURE — 87491 CHLMYD TRACH DNA AMP PROBE: CPT

## 2020-07-28 PROCEDURE — 3008F BODY MASS INDEX DOCD: CPT | Mod: CPTII,S$GLB,, | Performed by: OBSTETRICS & GYNECOLOGY

## 2020-07-28 PROCEDURE — 99214 OFFICE O/P EST MOD 30 MIN: CPT | Mod: S$GLB,,, | Performed by: OBSTETRICS & GYNECOLOGY

## 2020-07-28 NOTE — PROGRESS NOTES
Subjective:     Patient ID: Elvia Carr is a 19 y.o. female.     Chief Complaint: Vulvar Itch (For JOSE MANUEL today, +albicans, tx diflucan and boric acid) and Vaginal Discharge     History of Present Illness: This patient is a 19 y.o. female, who presents to the GYN [facility] clinic for re-evaluation of recurrent vulvovaginal irritation and itching.  She uses Jenifer for contraception.   In May of 2020, she was treated for yeast and was given betamethasone ointment to help itching.  Recently, she developed vulvovaginal irritation and dyspareunia.  In June she was seen in the clinic and her wet prep indicated Candida the culture showed Ines albicans she was treated with Diflucan and boric acid suppositories.  Her symptoms have improved but she complains of a discharge and continues to have dyspareunia.     Patient's last menstrual period was 07/05/2020.    Review of Systems    GENERAL: No fever, chills, fatigability or weightchange  SKIN: No rashes, itching or changes in color or texture of skin.  HEAD: No headaches or recent head trauma.  EYES: Visual acuity fine. No photophobia,r diplopia.  EARS: Denies earache or vertigo  NOSE: No loss of smell, no epistaxis or postnasal drip.  MOUTH & THROAT: No hoarseness or change in voice.   NODES: Denies swollen glands.  CHEST: Denies WOLFF, cyanosis, wheezing, cough and sputum production.  CARDIOVASCULAR: Denies chest pain, PND, orthopnea or reduced exercise tolerance.  ABDOMEN: Appetite fine. No weight loss. bloating, Denies diarrhea, abdominal pain, hematemesis or blood in stool.  URINARY: No flank pain, dysuria or hematuria.  PERIPHERAL VASCULAR: No claudication or cyanosis.Varicosities  MUSCULOSKELETAL: No joint stiffness or swelling. Denies back pain.muscle aches  NEUROLOGIC: No history of seizures, paralysis, alteration of gait or coordination.       Objective:       Physical Exam     APPEARANCE: Well nourished, well developed, in no acute  distress.    GENITOURINARY:  Vulva: No lesions. Normal female genital architecture.   Urethral Meatus: Normal size and location, no lesions, no prolapse.  Urethra: No masses, tenderness, prolapse or scarring.  Vagina:  Moist with rugae, no discharge, no significant cystocele or rectocele.  Cervix: No lesions, normal diameter, no stenosis, cervical motion tenderness noted.  Uterus: 6 week size, regular shape, mobile, tender, normal position, good support.  Adnexa: No masses, tenderness or CDS nodularity.  Anus Perineum: No lesions, no relaxation, no external hemorrhoids.  Abdomen: No masses, tenderness, hernia or ascites, no hepatasplenomegaly  Skin: No rashes, lesions, ulcers, acne, hirsutism.  Peripheral/lower extremities: No edema, erythema or tenderness.  Lymphatic: No axillary, neck or groin nodes palp.  Mental Status: Alert, oriented x 3, normal affect and mood.          @PROCEDURE:@  Wet Prep:  pH = 3.6  -WBCS = present  -Lactobacilli = present  -BV = Amsel negative  -Candida = Hyphae none seen  -Trichomnas = none seen  -Cells- Basal and Parabasal, Superficial: Maturation: m superficial cells predominate  -Impression:  Negative wet prep  -Treatment:  None indicated  -[facility] in p.r.n.         Assessment:      1. History of Candidal vulvovaginitis    2. Recurrent vaginitis    3. Vulvovaginal itching    4. Dyspareunia in female    5. Irritable bowel syndrome, unspecified type               Plan:  1.  Candida culture of the vagina taken today.  2.  Cervical screen taken today.  3.  Discussed dyspareunia of the possible need of pelvic floor physical therapy pending the results of her cultures.  4.  Return to clinic p.r.n.                      No orders of the defined types were placed in this encounter.

## 2020-07-31 LAB
C TRACH DNA SPEC QL NAA+PROBE: NOT DETECTED
N GONORRHOEA DNA SPEC QL NAA+PROBE: NOT DETECTED

## 2020-08-04 ENCOUNTER — OFFICE VISIT (OUTPATIENT)
Dept: DERMATOLOGY | Facility: CLINIC | Age: 20
End: 2020-08-04
Payer: COMMERCIAL

## 2020-08-04 DIAGNOSIS — L30.9 HAND ECZEMA: Primary | ICD-10-CM

## 2020-08-04 PROCEDURE — 99213 PR OFFICE/OUTPT VISIT, EST, LEVL III, 20-29 MIN: ICD-10-PCS | Mod: 95,,, | Performed by: STUDENT IN AN ORGANIZED HEALTH CARE EDUCATION/TRAINING PROGRAM

## 2020-08-04 PROCEDURE — 99213 OFFICE O/P EST LOW 20 MIN: CPT | Mod: 95,,, | Performed by: STUDENT IN AN ORGANIZED HEALTH CARE EDUCATION/TRAINING PROGRAM

## 2020-08-04 RX ORDER — TACROLIMUS 1 MG/G
OINTMENT TOPICAL 2 TIMES DAILY
Qty: 100 G | Refills: 1 | Status: SHIPPED | OUTPATIENT
Start: 2020-08-04 | End: 2020-08-12 | Stop reason: SDUPTHER

## 2020-08-04 RX ORDER — DEXTROAMPHETAMINE SACCHARATE, AMPHETAMINE ASPARTATE, DEXTROAMPHETAMINE SULFATE AND AMPHETAMINE SULFATE 5; 5; 5; 5 MG/1; MG/1; MG/1; MG/1
1 TABLET ORAL 2 TIMES DAILY
Qty: 60 TABLET | Refills: 0 | Status: CANCELLED | OUTPATIENT
Start: 2020-08-04 | End: 2020-09-03

## 2020-08-04 NOTE — PROGRESS NOTES
The patient location is: home  The chief complaint leading to consultation is: hand eczema not improving    Visit type: audiovisual    Face to Face time with patient: 10mins  10 minutes of total time spent on the encounter, which includes face to face time and non-face to face time preparing to see the patient (eg, review of tests), Obtaining and/or reviewing separately obtained history, Documenting clinical information in the electronic or other health record, Independently interpreting results (not separately reported) and communicating results to the patient/family/caregiver, or Care coordination (not separately reported).         Each patient to whom he or she provides medical services by telemedicine is:  (1) informed of the relationship between the physician and patient and the respective role of any other health care provider with respect to management of the patient; and (2) notified that he or she may decline to receive medical services by telemedicine and may withdraw from such care at any time.    Notes: Patient presents for follow-up of hand eczema, last seen in virtual visit where she was started on betamethasone ointment in addition to the Eucrisa that she was already using. Reports no improvement in symptoms. Still with very red and itching rash and blisters on the hands. Does use moisturizer after hand washing, which is frequent. No rash elsewhere.     ROS: otherwise negative     PE: Const/Neuro - AAOx3, NAD          Skin - erythematous eczematous plaques and vesicles with fissuring on the fingers and thenar/hypothenar eminence of the hands bilaterally.     A/P: 1) Hand eczema - Patient has tried and failed Eucrisa, betamethasone 0.1% ointment. Taking precautions and using gentle skin care regimen for hands. Will add protopic 0.1% ointment to regimen. Counseled to use thick ointments after hand washing and wearing cotton gloves at night time. May have to consider use of Dupixent if no improvement.

## 2020-08-04 NOTE — TELEPHONE ENCOUNTER
Pt is asking for refill request for adderall but was last seen over a yr ago,Do you want to schedule a Virtual visit or Ov please advise med was pend by pt in portal.

## 2020-08-05 ENCOUNTER — OFFICE VISIT (OUTPATIENT)
Dept: FAMILY MEDICINE | Facility: CLINIC | Age: 20
End: 2020-08-05
Attending: FAMILY MEDICINE
Payer: COMMERCIAL

## 2020-08-05 DIAGNOSIS — F90.0 ADHD, PREDOMINANTLY INATTENTIVE TYPE: ICD-10-CM

## 2020-08-05 LAB — FUNGUS SPEC CULT: ABNORMAL

## 2020-08-05 PROCEDURE — 99213 OFFICE O/P EST LOW 20 MIN: CPT | Mod: 95,,, | Performed by: FAMILY MEDICINE

## 2020-08-05 PROCEDURE — 99213 PR OFFICE/OUTPT VISIT, EST, LEVL III, 20-29 MIN: ICD-10-PCS | Mod: 95,,, | Performed by: FAMILY MEDICINE

## 2020-08-05 RX ORDER — DEXTROAMPHETAMINE SACCHARATE, AMPHETAMINE ASPARTATE, DEXTROAMPHETAMINE SULFATE AND AMPHETAMINE SULFATE 5; 5; 5; 5 MG/1; MG/1; MG/1; MG/1
1 TABLET ORAL 2 TIMES DAILY
Qty: 60 TABLET | Refills: 0 | Status: SHIPPED | OUTPATIENT
Start: 2020-08-05 | End: 2020-10-10 | Stop reason: SDUPTHER

## 2020-08-05 NOTE — PROGRESS NOTES
The patient location is: home  The chief complaint leading to consultation is: ADD  Visit type: Virtual visit with synchronous audio and video  Total time spent with patient: 11 min  Each patient to whom he or she provides medical services by telemedicine is:  (1) informed of the relationship between the physician and patient and the respective role of any other health care provider with respect to management of the patient; and (2) notified that he or she may decline to receive medical services by telemedicine and may withdraw from such care at any time.    Subjective:       Patient ID: Elvia Carr is a 19 y.o. female.    Chief Complaint: No chief complaint on file.    HPI     The patient stops taking her medication last sprain, at the onset of the pandemic and the initiation of our mind classes at [facility].  She found that she was able to go at my own pace.  She completed her semester with no difficulty.    However, in person classes will begin at [facility] later this month.  She would like to restart her medication.  She notes significant improvement when taking the medication.  She denies any side effects.      Patient Active Problem List   Diagnosis    Ulcerative proctitis with rectal bleeding    Abdominal pain, generalized    Anxiety and depression    Ulcerative colitis    ADHD, predominantly inattentive type       Current Outpatient Medications:     albuterol (PROAIR HFA) 90 mcg/actuation inhaler, Inhale 2 puffs into the lungs every 6 (six) hours as needed for Wheezing., Disp: 36 g, Rfl: 5    betamethasone valerate 0.1% (VALISONE) 0.1 % Oint, Apply twice a day to the hands., Disp: 45 g, Rfl: 1    dextroamphetamine-amphetamine (ADDERALL) 20 mg tablet, Take 1 tablet by mouth 2 (two) times daily., Disp: 60 tablet, Rfl: 0    dicyclomine (BENTYL) 20 mg tablet, Take 1 tablet (20 mg total) by mouth 3 (three) times daily as needed., Disp: 60 tablet, Rfl: 2    drospirenone-ethinyl estradiol (ANNABELLE)  3-0.03 mg per tablet, Take 1 tablet by mouth once daily., Disp: 84 tablet, Rfl: 4    EUCRISA 2 % Oint, , Disp: , Rfl:     FLUoxetine 20 MG capsule, , Disp: , Rfl:     gabapentin (NEURONTIN) 300 MG capsule, , Disp: , Rfl:     multivitamin capsule, Take 1 capsule by mouth once daily., Disp: , Rfl:     tacrolimus (PROTOPIC) 0.1 % ointment, Apply topically 2 (two) times daily., Disp: 100 g, Rfl: 1    traMADol (ULTRAM) 50 mg tablet, , Disp: , Rfl:     The following portions of the patient's history were reviewed and updated as appropriate: allergies, past family history, past medical history, past social history and past surgical history.    Review of Systems   Constitutional: Positive for activity change and unexpected weight change.   HENT: Negative for hearing loss, rhinorrhea and trouble swallowing.    Eyes: Negative for discharge and visual disturbance.   Respiratory: Negative for chest tightness and wheezing.    Cardiovascular: Negative for chest pain and palpitations.   Gastrointestinal: Negative for blood in stool, constipation, diarrhea and vomiting.   Endocrine: Negative for polydipsia and polyuria.   Genitourinary: Negative for difficulty urinating, dysuria, hematuria and menstrual problem.   Musculoskeletal: Negative for arthralgias, joint swelling and neck pain.   Neurological: Negative for weakness and headaches.   Psychiatric/Behavioral: Positive for dysphoric mood. Negative for confusion.       Objective:      There were no vitals taken for this visit.    Physical Exam  Constitutional:       General: She is not in acute distress.     Appearance: She is well-developed.   Neurological:      Mental Status: She is alert and oriented to person, place, and time.   Psychiatric:         Behavior: Behavior normal.         Assessment:       1. ADHD, predominantly inattentive type        Plan:       Agreed to restart statin medication at previous dosage of Adderall 20 mg twice daily.  The patient will remain  "in contact with me through the patient portal, and we will adjust dosage as needed once classes begin.          "This note will not be shared with the patient."      "

## 2020-08-06 ENCOUNTER — TELEPHONE (OUTPATIENT)
Dept: DERMATOLOGY | Facility: CLINIC | Age: 20
End: 2020-08-06

## 2020-08-06 DIAGNOSIS — L30.9 DERMATITIS: Primary | ICD-10-CM

## 2020-08-06 RX ORDER — PIMECROLIMUS 10 MG/G
CREAM TOPICAL 2 TIMES DAILY
Qty: 100 G | Refills: 0 | Status: SHIPPED | OUTPATIENT
Start: 2020-08-06 | End: 2020-12-01

## 2020-08-06 NOTE — TELEPHONE ENCOUNTER
PA for tacrolimus submitted to covermymeds, insurance does prefer   FLUOCINOLONE ACET CREAM 60GM 0.01%   FLUOCINONIDE OINTMENT 30GM 0.05%   CLOBETASOL PROPIONATE OINTMEN 0.05%   BETAMETHASONE DIP AUG OINT 0.05%   BETAMETHASONE DIP AUG OINT 0.05%   HYDROCORTISONE GURINDER OINT 15GM 0.2%   HYDROCORTISONE GURINDER OINT 45GM 0.2%.    After PA response can have lisa send something else

## 2020-08-12 ENCOUNTER — PATIENT MESSAGE (OUTPATIENT)
Dept: DERMATOLOGY | Facility: CLINIC | Age: 20
End: 2020-08-12

## 2020-08-12 DIAGNOSIS — L30.9 HAND ECZEMA: ICD-10-CM

## 2020-08-12 RX ORDER — TACROLIMUS 1 MG/G
OINTMENT TOPICAL 2 TIMES DAILY
Qty: 100 G | Refills: 1 | Status: SHIPPED | OUTPATIENT
Start: 2020-08-12 | End: 2020-12-08

## 2020-08-13 ENCOUNTER — TELEPHONE (OUTPATIENT)
Dept: DERMATOLOGY | Facility: CLINIC | Age: 20
End: 2020-08-13

## 2020-08-13 NOTE — TELEPHONE ENCOUNTER
Prior Authorization was approved for medication:     Pimecrolimus    PA Case: [id number]    Approved:  Start day 8/13/2020                     End Date 8/13/2021    Caseld: [id number]      Mojgan Guillaume MA

## 2020-08-17 ENCOUNTER — TELEPHONE (OUTPATIENT)
Dept: PHARMACY | Facility: CLINIC | Age: 20
End: 2020-08-17

## 2020-08-31 ENCOUNTER — PATIENT MESSAGE (OUTPATIENT)
Dept: OBSTETRICS AND GYNECOLOGY | Facility: CLINIC | Age: 20
End: 2020-08-31

## 2020-09-03 LAB — FUNGUS SPEC CULT: NORMAL

## 2020-09-15 ENCOUNTER — PATIENT OUTREACH (OUTPATIENT)
Dept: ADMINISTRATIVE | Facility: OTHER | Age: 20
End: 2020-09-15

## 2020-09-16 ENCOUNTER — OFFICE VISIT (OUTPATIENT)
Dept: DERMATOLOGY | Facility: CLINIC | Age: 20
End: 2020-09-16
Payer: COMMERCIAL

## 2020-09-16 DIAGNOSIS — L30.9 HAND ECZEMA: Primary | ICD-10-CM

## 2020-09-16 PROCEDURE — 99213 PR OFFICE/OUTPT VISIT, EST, LEVL III, 20-29 MIN: ICD-10-PCS | Mod: 95,,, | Performed by: STUDENT IN AN ORGANIZED HEALTH CARE EDUCATION/TRAINING PROGRAM

## 2020-09-16 PROCEDURE — 99213 OFFICE O/P EST LOW 20 MIN: CPT | Mod: 95,,, | Performed by: STUDENT IN AN ORGANIZED HEALTH CARE EDUCATION/TRAINING PROGRAM

## 2020-09-16 NOTE — PROGRESS NOTES
The patient location is: home  The chief complaint leading to consultation is: follow-up hand eczema    Visit type: audiovisual    Face to Face time with patient: 10mins  10 minutes of total time spent on the encounter, which includes face to face time and non-face to face time preparing to see the patient (eg, review of tests), Obtaining and/or reviewing separately obtained history, Documenting clinical information in the electronic or other health record, Independently interpreting results (not separately reported) and communicating results to the patient/family/caregiver, or Care coordination (not separately reported).         Each patient to whom he or she provides medical services by telemedicine is:  (1) informed of the relationship between the physician and patient and the respective role of any other health care provider with respect to management of the patient; and (2) notified that he or she may decline to receive medical services by telemedicine and may withdraw from such care at any time.    Notes: Patient presents for follow-up of dyshidrotic eczema. Currently on Eucrisa, topical steroids, and elidel. Some improvement in symptoms, however, still with significant involvement of rash on the hands.     ROS: otherwise negative     PE: Const/Neuro - AAOx3, NAD          Skin - eczematous plaques on the hands bilaterally.     A/P: 1) Dyshidrotic eczema - after discussion with patient, will schedule for patch testing. Discussed Dupixent with patient and she declined. Counseled patient on gentle skin care regimen, including need for sensitive soaps/detergents, as well as need for frequent use of sensitize moisturizers. Continue topical steroids.

## 2020-09-22 ENCOUNTER — PATIENT MESSAGE (OUTPATIENT)
Dept: OBSTETRICS AND GYNECOLOGY | Facility: CLINIC | Age: 20
End: 2020-09-22

## 2020-09-22 DIAGNOSIS — N94.10 DYSPAREUNIA IN FEMALE: Primary | ICD-10-CM

## 2020-09-27 ENCOUNTER — PATIENT MESSAGE (OUTPATIENT)
Dept: DERMATOLOGY | Facility: CLINIC | Age: 20
End: 2020-09-27

## 2020-09-28 ENCOUNTER — CLINICAL SUPPORT (OUTPATIENT)
Dept: DERMATOLOGY | Facility: CLINIC | Age: 20
End: 2020-09-28
Payer: COMMERCIAL

## 2020-09-28 DIAGNOSIS — L20.9 ATOPIC DERMATITIS, UNSPECIFIED TYPE: Primary | ICD-10-CM

## 2020-09-28 PROCEDURE — 99499 NO LOS: ICD-10-PCS | Mod: S$GLB,,, | Performed by: STUDENT IN AN ORGANIZED HEALTH CARE EDUCATION/TRAINING PROGRAM

## 2020-09-28 PROCEDURE — 99999 PR PBB SHADOW E&M-EST. PATIENT-LVL III: ICD-10-PCS | Mod: PBBFAC,,,

## 2020-09-28 PROCEDURE — 99999 PR PBB SHADOW E&M-EST. PATIENT-LVL III: CPT | Mod: PBBFAC,,,

## 2020-09-28 PROCEDURE — 99499 UNLISTED E&M SERVICE: CPT | Mod: S$GLB,,, | Performed by: STUDENT IN AN ORGANIZED HEALTH CARE EDUCATION/TRAINING PROGRAM

## 2020-09-28 NOTE — PROGRESS NOTES
History of possible allergic contact dermatitis.  Here today for patch test placement.  Instructions have been reviewed with the patient.      Denies recent oral corticosteroid intake.  No involvement of back.      T.R.U.E. Test patch testing placed today (36 allergens, 3 panels).  Patient given written and verbal instructions detailing to not have areas in direct contact with any water. Patient instructed to avoid hair washing until 72-96 hours.  Pt instructed to avoid indoor/outdoor activities that could lead to increased sweating.  Will remove patches and perform read at 48 hours as nurse's visit and then again at 72-96 hours as MD visit. Patient acknowledged understanding.

## 2020-09-29 ENCOUNTER — CLINICAL SUPPORT (OUTPATIENT)
Dept: REHABILITATION | Facility: HOSPITAL | Age: 20
End: 2020-09-29
Payer: COMMERCIAL

## 2020-09-29 DIAGNOSIS — N94.10 DYSPAREUNIA IN FEMALE: ICD-10-CM

## 2020-09-29 DIAGNOSIS — R10.84 ABDOMINAL PAIN, GENERALIZED: ICD-10-CM

## 2020-09-29 DIAGNOSIS — K51.013 ULCERATIVE PANCOLITIS WITH FISTULA: Primary | ICD-10-CM

## 2020-09-29 PROCEDURE — 97161 PT EVAL LOW COMPLEX 20 MIN: CPT | Mod: PN | Performed by: PHYSICAL THERAPIST

## 2020-09-29 NOTE — PATIENT INSTRUCTIONS
Deep breathing 5 min daily- YOGA  Increase fibrous foods- avacado, berries, beans, broccoli  Food diary

## 2020-09-29 NOTE — PLAN OF CARE
[facility]  Pelvic Health Physical Therapy Initial Evaluation    Date: 9/29/2020   Name: Elvia Gill [facility] Number: [id number]  Therapy Diagnosis:   Encounter Diagnoses   Name Primary?    Dyspareunia in female     Ulcerative pancolitis with fistula Yes    Abdominal pain, generalized      Physician: Gaston James III, *    Physician Orders: PT Eval and Treat   Medical Diagnosis from Referral:   Evaluation Date: 9/29/2020  Authorization Period Expiration: 9/29/20-12/31/20  Plan of Care Expiration: 12/31/20  Visit # / Visits authorized: 1/20    Time In: 330  Time Out: 425  Total Appointment Time (timed & untimed codes): 55 min minutes    Precautions: universal    Subjective     Date of onset: IBS and abdominal cramping began 4 years ago and was dx with ulcerative colitis. Recently, Pt had bowel fistula that was medicated and now healed but she continues to have abdominal pain, urinary issues nad bowel issues. She also reports pain with intercourse    History of current condition - Elvia reports: UC in 2017- with bowel issues    OB/GYN History: painful vaginal penetration, pelvic pain and ulcerative colitis  Sexually active? Yes  Pain with vaginal exams, intercourse or tampon use? Yes    Bladder/Bowel History: trouble initiating urine stream, slow stream, trouble feeling bladder urge/fullness, trouble emptying bladder completely, recurrent bladder infections, constipation/straining for movement and straining or pushing to empty bladder   Frequency of urination:   Daytime: every 2-3           Nighttime: 3-4x night- medications helping   Difficulty initiating urine stream: Yes   Urine stream: weak, splayed and interrupted   Complete emptying: No   Bladder leakage: No   Frequency of incidents: 0   Amount leaked (urine): 0   Urinary Urgency: No, Able to delay the urge for at least 0 minute(s).   Frequency of bowel movements: once a day   Difficulty initiating BM:  Yes   Quality/Shape of BM: 1-6   Does Patient Feel Empty after BM? No   Fiber Supplements or Laxative Use? No   Colon leakage: No   Frequency of incidents: 0    Amount leaked (bowels): streaking/staining   Form of protection: none   Number of pads required in 24 hours: 0    Pain:  Location: abdomen  Current 0/10, worst 7/10, best 0/10   Description: Sharp  Aggravating Factors/Activities that cause symptoms: Vaginal exam/provocation and Bowel movement    Easing Factors: bowel movement     Medical History: Elvia  has a past medical history of Asthma and UC (ulcerative colitis) (2017).     Surgical History: Elvia Carr  has a past surgical history that includes NONE; Colonoscopy (N/A, 2017); Insertion of contraceptive capsule (2016); and Colonoscopy (N/A, 2019).    Medications: Elvia has a current medication list which includes the following prescription(s): albuterol, betamethasone valerate 0.1%, dextroamphetamine-amphetamine, dicyclomine, drospirenone-ethinyl estradiol, eucrisa, fluoxetine, gabapentin, multivitamin, pimecrolimus, tacrolimus, and tramadol.    Allergies: Review of patient's allergies indicates:  No Known Allergies     Imagin colonoscopy    Prior Therapy/Previous treatment included: yes  Social History: Pt lives roommates  Current exercise: treadmill, squats, and abd  Occupation: Pt student in animal science  Prior Level of Function: denies pain with vaginal exams and intercourse, denies bowel and bladder issues  Current Level of Function: pain with vaginal exams, bowel and bladder pains    Types of fluid intake: water and tea  Diet: cereal: honey nit cheerios and cocoa deo, almond milk; fired chicken/ fries, protein bar, stawberries  Habitus: well developed, well nourished  Abuse/Neglect: No     Pts goals: 1. To have normal sex, stop urinating at night, 3. strong urine stream and normal BM    OBJECTIVE     See EMR under MEDIA for written consent  provided 9/29/2020  Chaperone: declined    ORTHO SCREEN  Posture in sitting: slouched   Posture in standing: WNL  Pelvic alignment:    SI Joint Palpation:   Sacral spring test:  (Positive=NO spring)  Adductor Palpation:     ABDOMINAL WALL ASSESSMENT  Palpation: moderate increased tone and tenderness along ascending, transverse and descending colon  Abdominal strength: Rectus abdominus:      Transverse abdominus:   Scarring:   Pelvic Floor Muscle and Transverse Abdominus Synergy:   Diastasis:       BREATHING MECHANICS ASSESSMENT   Thorax Assessment During Quiet Respiration:   Thorax Assessment During Deep Respiration:     VAGINAL PELVIC FLOOR EXAM- to be performed next visit    EXTERNAL ASSESSMENT  Introitus:   Skin condition:   Scarring:    Sensation:    Pain:   Voluntary contraction:   Voluntary relaxation:   Involuntary contraction:   Bearing down:   Perineal descent:   Comments:       INTERNAL ASSESSMENT  Pain:    Sensation:    Vaginal vault:    Muscle Bulk:    Muscle Power: /5  Muscle Endurance:  sec  # Reps To Fatigue:     Fast Contractions in 10 seconds:      Quality of contraction:    Specificity:    Coordination:    Prolapse check:   Comments:     RECTAL PELVIC FLOOR EXAM    EXTERNAL ASSESSMENT  Anus:   Skin condition:    Scarring:   Sensation:    Pain:   Voluntary contraction:   Voluntary relaxation:   Involuntary contraction:   Bearing down:   Anal Call:   Discharge:        INTERNAL ASSESSMENT  EAS tone:    Impaction:    Pain:   Sensation:    Muscle Bulk:    Muscle Power: /5  Muscle Endurance:  sec  # Reps To Fatigue:     Fast Contractions in 10 seconds:      Quality of contraction:    Specificity:   Coordination:    Comments:     Limitation/Restriction for FOTO  Survey    Therapist reviewed FOTO scores for Elvia Carr on 9/29/2020.   FOTO documents entered into EPIC - see Media section.    Limitation Score: %       TREATMENT     Treatment Time In: 400  Treatment Time Out: 425  Total  Treatment time (time-based codes) separate from Evaluation: 25 minutes      Patient Education provided:   general anatomy/physiology of urinary/ bowel  system, benefits of treatment, risks of treatment and alternative methods of treatment was discussed with the pt. Additionally, bladder irritants, anatomy/physiology of pelvic floor, bladder retraining, diaphragmatic breathing and behavior modifications was reviewed. Also, elimination diet for possible food allergies    Home Exercises provided:  Written Home Exercises provided: yes.  Exercises were reviewed and Elvia was able to demonstrate them prior to the end of the session.    Elvia demonstrated good  understanding of the education provided.     See EMR under Patient Instructions for exercises provided 9/29/2020.    Assessment     Elvia is a 20 y.o. female referred to outpatient Physical Therapy with a medical diagnosis of dyspareunia and ulcerative colitis. Pt presents with increased nocturia, poor fluid intake, poor diet, incomplete urination, dysfunctional voiding, dysfunctional defecation and unable to co-contract or co-relax abdominal wall and pelvic floor muscles.      Pt prognosis is Good.   Pt will benefit from skilled outpatient Physical Therapy to address the deficits stated above and in the chart below, provide pt/family education, and to maximize pt's level of independence.     Plan of care discussed with patient: Yes  Pt's spiritual, cultural and educational needs considered and patient is agreeable to the plan of care and goals as stated below:     Anticipated Barriers for therapy: none    Medical Necessity is demonstrated by the following:    History  Co-morbidities and personal factors that may impact the plan of care Co-morbidities   anxiety, chronic constipation, coping style/mechanism, depression and ulcerative colitis    Personal Factors  age     moderate   Examination  Body structures and functions, activity limitations and  participation restrictions that may impact the plan of care Body Regions/Systems/Functions:  increased frequency of urination, increased nocturia, poor fluid intake, poor diet, incomplete urination, dysfunctional voiding and dysfunctional defecation     Activity Limitations:  bearing down for BM, pain with BM , initiating a BM, Initiating urine stream, pain with full bladder affecting ADL participation and/or sleep, full bladder emptying, intercourse/vaginal exam/tampon use without pain, sleep uninterrupted by excessive nocturia, Pain with ADLs and Participating in social activities and ADLs due to pelvic pressure    Participation Restrictions:  all ADLs/iADLs uninterrupted by discomfort associated with chronic constipation, social activities with friends/family, relationship with spouse/partner, well woman's exam, ADL participation affected by pain, regularly having a comfortable BM, Sleep restrictions, Pelvic pressure with ADLs.  and exercise restrictions due to pain     Activity limitations:   Learning and applying knowledge  no deficits    General Tasks and Commands  no deficits    Communication  no deficits    Mobility  no deficits    Self care  no deficits    Domestic Life  no deficits    Interactions/Relationships  no deficits    Life Areas  no deficits    Community and Social Life  no deficits       moderate   Clinical Presentation stable and uncomplicated low   Decision Making/ Complexity Score: low       Goals:  Short Term Goals: 1-4 weeks   Pt denies nocturia  Pt reports reduce abdominal pain by 50% from 8/10 to 4/10 intensity and reduced frequency  Pt reports minimal pain with vaginal exams    Long Term Goals: 5-12 weeks   Pt reports no pain with vaginal exams  Pt reports less bowel issues- having BM type 4 daily to 5x weekly without pain  Pt report minimal abdominal pains from 4/10 to 1-2/10 most days of the week    Plan     Plan of care Certification: 9/29/2020 to 12/31/20.    Outpatient Physical  Therapy 2 times weekly for 12 weeks to include the following interventions: therapeutic exercises, therapeutic activity, neuromuscular re-education, manual therapy, modalities PRN, patient/family education, dry needling, orthotic fabrication and self care/home management    Jennifer Carter, PT

## 2020-09-30 ENCOUNTER — CLINICAL SUPPORT (OUTPATIENT)
Dept: DERMATOLOGY | Facility: CLINIC | Age: 20
End: 2020-09-30
Payer: COMMERCIAL

## 2020-09-30 DIAGNOSIS — L20.9 ATOPIC DERMATITIS, UNSPECIFIED TYPE: Primary | ICD-10-CM

## 2020-09-30 PROCEDURE — 99999 PR PBB SHADOW E&M-EST. PATIENT-LVL III: CPT | Mod: PBBFAC,,,

## 2020-09-30 PROCEDURE — 99999 PR PBB SHADOW E&M-EST. PATIENT-LVL III: ICD-10-PCS | Mod: PBBFAC,,,

## 2020-09-30 NOTE — PROGRESS NOTES
History of possible allergic contact dermatitis.  Here today for 48 hour patch test read.  Instructions have been reviewed with the patient.      Denies recent oral corticosteroid intake.  No involvement of back.      T.R.U.E. Test patch testing removed today (36 allergens, 3 panels).  Patient was allowed to sit in examination for 30 minutes prior to reading.  Pt noted to be positive to the following allergens:    #6, #23 and #28    Each allergen discussed with patient and patient recommend to avoid products with these allergens.  Patient given T.R.U.E. Test handout on each positive allergen.  The patient acknowledged understanding.  Recommend patient return clinic for to 72-96 hour read.

## 2020-10-01 ENCOUNTER — OFFICE VISIT (OUTPATIENT)
Dept: DERMATOLOGY | Facility: CLINIC | Age: 20
End: 2020-10-01
Payer: COMMERCIAL

## 2020-10-01 DIAGNOSIS — L23.9 ALLERGIC CONTACT DERMATITIS, UNSPECIFIED TRIGGER: Primary | ICD-10-CM

## 2020-10-01 PROCEDURE — 99999 PR PBB SHADOW E&M-EST. PATIENT-LVL III: ICD-10-PCS | Mod: PBBFAC,,, | Performed by: STUDENT IN AN ORGANIZED HEALTH CARE EDUCATION/TRAINING PROGRAM

## 2020-10-01 PROCEDURE — 99212 PR OFFICE/OUTPT VISIT, EST, LEVL II, 10-19 MIN: ICD-10-PCS | Mod: S$GLB,,, | Performed by: STUDENT IN AN ORGANIZED HEALTH CARE EDUCATION/TRAINING PROGRAM

## 2020-10-01 PROCEDURE — 99999 PR PBB SHADOW E&M-EST. PATIENT-LVL III: CPT | Mod: PBBFAC,,, | Performed by: STUDENT IN AN ORGANIZED HEALTH CARE EDUCATION/TRAINING PROGRAM

## 2020-10-01 PROCEDURE — 99212 OFFICE O/P EST SF 10 MIN: CPT | Mod: S$GLB,,, | Performed by: STUDENT IN AN ORGANIZED HEALTH CARE EDUCATION/TRAINING PROGRAM

## 2020-10-01 NOTE — PROGRESS NOTES
Subjective:       Patient ID:  Elvia Carr is a 20 y.o. female who presents for   Chief Complaint   Patient presents with    Itching     History of Present Illness: The patient presents for follow up of patch testing final reading. No issues in the interim. Tolerated procedure well.         Review of Systems   Constitutional: Negative for fever and chills.        Objective:    Physical Exam   Constitutional: She appears well-developed and well-nourished. No distress.   Neurological: She is alert and oriented to person, place, and time. She is not disoriented.   Psychiatric: She has a normal mood and affect.   Skin:   Areas Examined (abnormalities noted in diagram):   Head / Face Inspection Performed  Neck Inspection Performed  Back Inspection Performed         Diagram Legend     Erythematous scaling macule/papule c/w actinic keratosis       Vascular papule c/w angioma      Pigmented verrucoid papule/plaque c/w seborrheic keratosis      Yellow umbilicated papule c/w sebaceous hyperplasia      Irregularly shaped tan macule c/w lentigo     1-2 mm smooth white papules consistent with Milia      Movable subcutaneous cyst with punctum c/w epidermal inclusion cyst      Subcutaneous movable cyst c/w pilar cyst      Firm pink to brown papule c/w dermatofibroma      Pedunculated fleshy papule(s) c/w skin tag(s)      Evenly pigmented macule c/w junctional nevus     Mildly variegated pigmented, slightly irregular-bordered macule c/w mildly atypical nevus      Flesh colored to evenly pigmented papule c/w intradermal nevus       Pink pearly papule/plaque c/w basal cell carcinoma      Erythematous hyperkeratotic cursted plaque c/w SCC      Surgical scar with no sign of skin cancer recurrence      Open and closed comedones      Inflammatory papules and pustules      Verrucoid papule consistent consistent with wart     Erythematous eczematous patches and plaques     Dystrophic onycholytic nail with subungual debris  c/w onychomycosis     Umbilicated papule    Erythematous-base heme-crusted tan verrucoid plaque consistent with inflamed seborrheic keratosis     Erythematous Silvery Scaling Plaque c/w Psoriasis     See annotation      Assessment / Plan:        Allergic contact dermatitis- Patch test positive for allergen to thimerosal, gold, and fragrance mix.    Counseled on avoidance of products containing above.            No follow-ups on file.

## 2020-10-02 ENCOUNTER — PATIENT MESSAGE (OUTPATIENT)
Dept: REHABILITATION | Facility: HOSPITAL | Age: 20
End: 2020-10-02

## 2020-10-02 ENCOUNTER — PATIENT MESSAGE (OUTPATIENT)
Dept: FAMILY MEDICINE | Facility: CLINIC | Age: 20
End: 2020-10-02

## 2020-10-07 ENCOUNTER — OFFICE VISIT (OUTPATIENT)
Dept: ALLERGY | Facility: CLINIC | Age: 20
End: 2020-10-07
Payer: COMMERCIAL

## 2020-10-07 ENCOUNTER — LAB VISIT (OUTPATIENT)
Dept: LAB | Facility: HOSPITAL | Age: 20
End: 2020-10-07
Attending: ALLERGY & IMMUNOLOGY
Payer: COMMERCIAL

## 2020-10-07 VITALS
SYSTOLIC BLOOD PRESSURE: 139 MMHG | HEART RATE: 103 BPM | TEMPERATURE: 98 F | BODY MASS INDEX: 26.63 KG/M2 | DIASTOLIC BLOOD PRESSURE: 99 MMHG | WEIGHT: 159.81 LBS | RESPIRATION RATE: 20 BRPM | HEIGHT: 65 IN

## 2020-10-07 DIAGNOSIS — K90.49 FOOD INTOLERANCE: ICD-10-CM

## 2020-10-07 DIAGNOSIS — L20.89 OTHER ATOPIC DERMATITIS: Primary | ICD-10-CM

## 2020-10-07 PROCEDURE — 99999 PR PBB SHADOW E&M-EST. PATIENT-LVL III: ICD-10-PCS | Mod: PBBFAC,,, | Performed by: ALLERGY & IMMUNOLOGY

## 2020-10-07 PROCEDURE — 99204 PR OFFICE/OUTPT VISIT, NEW, LEVL IV, 45-59 MIN: ICD-10-PCS | Mod: S$GLB,,, | Performed by: ALLERGY & IMMUNOLOGY

## 2020-10-07 PROCEDURE — 86003 ALLG SPEC IGE CRUDE XTRC EA: CPT | Mod: 59

## 2020-10-07 PROCEDURE — 3008F BODY MASS INDEX DOCD: CPT | Mod: CPTII,S$GLB,, | Performed by: ALLERGY & IMMUNOLOGY

## 2020-10-07 PROCEDURE — 86003 ALLG SPEC IGE CRUDE XTRC EA: CPT

## 2020-10-07 PROCEDURE — 99999 PR PBB SHADOW E&M-EST. PATIENT-LVL III: CPT | Mod: PBBFAC,,, | Performed by: ALLERGY & IMMUNOLOGY

## 2020-10-07 PROCEDURE — 86008 ALLG SPEC IGE RECOMB EA: CPT | Mod: 59

## 2020-10-07 PROCEDURE — 3008F PR BODY MASS INDEX (BMI) DOCUMENTED: ICD-10-PCS | Mod: CPTII,S$GLB,, | Performed by: ALLERGY & IMMUNOLOGY

## 2020-10-07 PROCEDURE — 36415 COLL VENOUS BLD VENIPUNCTURE: CPT

## 2020-10-07 PROCEDURE — 99204 OFFICE O/P NEW MOD 45 MIN: CPT | Mod: S$GLB,,, | Performed by: ALLERGY & IMMUNOLOGY

## 2020-10-07 RX ORDER — DULOXETIN HYDROCHLORIDE 30 MG/1
90 CAPSULE, DELAYED RELEASE ORAL DAILY
COMMUNITY
End: 2021-04-20

## 2020-10-07 NOTE — PROGRESS NOTES
"Subjective:       Patient ID: Elvia Carr is a 20 y.o. female.    Initial Visit    Chief Complaint:  Patient in for food allergy testing      HPI: 20 year old female with a history of Ulcerative Colitis, IBS and Eczema. She reports having eczema for two years. She can not identify foods that exacerbate her eczema. She denies hives with a food. She denies anaphylaxis with a food.  She reports watery eyes in the Spring. She denies runny nose or nasal congestion. She does not take eye symptoms. She was advised by her optometrist to use an over the counter lubricant.    "My pelvis  told me have testing done, because it is an internal problem".  She would like testing to dairy. She reports that whenever she drinks or has it "I feel heavy".        Past Medical History:   Diagnosis Date    Asthma     UC (ulcerative colitis) 04/2017     Family History   Problem Relation Age of Onset    Osteoarthritis Mother     Migraines Mother     Heart failure Maternal Grandfather     Breast cancer Neg Hx     Colon cancer Neg Hx     Ovarian cancer Neg Hx      Current Outpatient Medications on File Prior to Visit   Medication Sig Dispense Refill    dicyclomine (BENTYL) 20 mg tablet TAKE 1 TABLET(20 MG) BY MOUTH THREE TIMES DAILY AS NEEDED 60 tablet 2    drospirenone-ethinyl estradiol (ANNABELLE) 3-0.03 mg per tablet Take 1 tablet by mouth once daily. 84 tablet 4    DULoxetine (CYMBALTA) 30 MG capsule Take 90 mg by mouth once daily. Takes 90mg in the am      gabapentin (NEURONTIN) 300 MG capsule       multivitamin capsule Take 1 capsule by mouth once daily.      pimecrolimus (ELIDEL) 1 % cream Apply topically 2 (two) times daily. 100 g 0    albuterol (PROAIR HFA) 90 mcg/actuation inhaler Inhale 2 puffs into the lungs every 6 (six) hours as needed for Wheezing. (Patient not taking: Reported on 10/7/2020) 36 g 5    betamethasone valerate 0.1% (VALISONE) 0.1 % Oint Apply twice a day to the hands. 45 g " 1    dextroamphetamine-amphetamine (ADDERALL) 20 mg tablet Take 1 tablet by mouth 2 (two) times daily. 60 tablet 0    EUCRISA 2 % Oint       FLUoxetine 20 MG capsule       tacrolimus (PROTOPIC) 0.1 % ointment Apply topically 2 (two) times daily. 100 g 1    traMADol (ULTRAM) 50 mg tablet        No current facility-administered medications on file prior to visit.      Review of patient's allergies indicates:  No Known Allergies    Environmental History: 2 dogs She is not a smoker.  Review of Systems   Constitutional: Negative for chills and fever.   HENT: Negative for rhinorrhea.    Eyes: Negative for discharge and itching.   Respiratory: Negative for chest tightness, shortness of breath and wheezing.    Cardiovascular: Negative for chest pain and leg swelling.   Gastrointestinal: Negative for nausea and vomiting.   Skin: Positive for rash. Negative for wound.   Allergic/Immunologic: Negative for environmental allergies and food allergies.   Neurological: Negative for dizziness and light-headedness.   Hematological: Negative for adenopathy. Does not bruise/bleed easily.   Psychiatric/Behavioral: Negative for behavioral problems and suicidal ideas.        Objective:    Physical Exam  Vitals signs reviewed.   Constitutional:       General: She is not in acute distress.     Appearance: Normal appearance. She is well-developed. She is not ill-appearing, toxic-appearing or diaphoretic.   HENT:      Head: Normocephalic and atraumatic.      Right Ear: Tympanic membrane, ear canal and external ear normal. There is no impacted cerumen.      Left Ear: Tympanic membrane, ear canal and external ear normal. There is no impacted cerumen.      Nose: Nose normal. No congestion or rhinorrhea.      Mouth/Throat:      Pharynx: No oropharyngeal exudate or posterior oropharyngeal erythema.   Eyes:      General: No scleral icterus.        Right eye: No discharge.         Left eye: No discharge.      Pupils: Pupils are equal, round,  and reactive to light.   Neck:      Musculoskeletal: Normal range of motion and neck supple. No neck rigidity or muscular tenderness.      Thyroid: No thyromegaly.   Cardiovascular:      Rate and Rhythm: Normal rate and regular rhythm.      Heart sounds: Normal heart sounds. No murmur. No friction rub. No gallop.    Pulmonary:      Effort: Pulmonary effort is normal. No respiratory distress.      Breath sounds: Normal breath sounds. No stridor. No wheezing, rhonchi or rales.   Chest:      Chest wall: No tenderness.   Abdominal:      General: Bowel sounds are normal. There is no distension.      Palpations: Abdomen is soft. There is no mass.      Tenderness: There is no abdominal tenderness. There is no guarding or rebound.      Hernia: No hernia is present.   Musculoskeletal: Normal range of motion.   Lymphadenopathy:      Cervical: No cervical adenopathy.   Skin:     General: Skin is warm.      Coloration: Skin is not jaundiced or pale.      Findings: Rash present. No bruising or erythema.      Comments: Hands lichenification   Neurological:      Mental Status: She is alert and oriented to person, place, and time.      Motor: No weakness.      Gait: Gait normal.   Psychiatric:         Thought Content: Thought content normal.         Judgment: Judgment normal.       Unable to skin prick test, due to the medications that she is taking.    Assessment:       1. Other atopic dermatitis    2. Food intolerance         Plan:       Other atopic dermatitis    Food intolerance  -     Allergen, Milk Components IGE; Future; Expected date: 10/07/2020  -     Pork IgE; Future; Expected date: 10/07/2020  -     Beef IgE; Future; Expected date: 10/07/2020  -     Allergen, Spinach; Future; Expected date: 10/07/2020    Explained the difference between an IgE mediated allergy (hives, anaphylaxis) and a food intolerance (bloating with cow's milk). Recommend that she avoid all foods that cause symptoms.  She denies a history of  anaphylaxis or hives.  RTC 2 weeks or sooner, if needed.    MD,ROSE MARY

## 2020-10-12 ENCOUNTER — CLINICAL SUPPORT (OUTPATIENT)
Dept: REHABILITATION | Facility: HOSPITAL | Age: 20
End: 2020-10-12
Payer: COMMERCIAL

## 2020-10-12 DIAGNOSIS — N94.10 DYSPAREUNIA IN FEMALE: Primary | ICD-10-CM

## 2020-10-12 DIAGNOSIS — K51.013 ULCERATIVE PANCOLITIS WITH FISTULA: ICD-10-CM

## 2020-10-12 LAB
A-LACTALB IGE QN: <0.1 KU/L
B-LACTOGLOB IGE QN: <0.1 KU/L
BEEF IGE QN: <0.1 KU/L
CASEIN IGE QN: <0.1 KU/L
COW MILK IGE QN: <0.1 KU/L
DEPRECATED BEEF IGE RAST QL: NORMAL
DEPRECATED MISC ALLERGEN IGE RAST QL: NORMAL
DEPRECATED PORK IGE RAST QL: NORMAL
DEPRECATED SPINACH IGE RAST QL: NORMAL
PORK IGE QN: <0.1 KU/L
SPINACH IGE QN: <0.1 KU/L

## 2020-10-12 PROCEDURE — 97110 THERAPEUTIC EXERCISES: CPT | Mod: PN | Performed by: PHYSICAL THERAPIST

## 2020-10-12 PROCEDURE — 97140 MANUAL THERAPY 1/> REGIONS: CPT | Mod: PN | Performed by: PHYSICAL THERAPIST
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
## 2020-10-13 RX ORDER — DEXTROAMPHETAMINE SACCHARATE, AMPHETAMINE ASPARTATE, DEXTROAMPHETAMINE SULFATE AND AMPHETAMINE SULFATE 5; 5; 5; 5 MG/1; MG/1; MG/1; MG/1
1 TABLET ORAL 2 TIMES DAILY
Qty: 60 TABLET | Refills: 0 | Status: SHIPPED | OUTPATIENT
Start: 2020-10-13 | End: 2020-12-07 | Stop reason: SDUPTHER

## 2020-10-14 ENCOUNTER — PATIENT MESSAGE (OUTPATIENT)
Dept: ALLERGY | Facility: CLINIC | Age: 20
End: 2020-10-14

## 2020-10-20 ENCOUNTER — CLINICAL SUPPORT (OUTPATIENT)
Dept: REHABILITATION | Facility: HOSPITAL | Age: 20
End: 2020-10-20
Payer: COMMERCIAL

## 2020-10-20 DIAGNOSIS — N94.10 DYSPAREUNIA IN FEMALE: Primary | ICD-10-CM

## 2020-10-20 PROCEDURE — 97112 NEUROMUSCULAR REEDUCATION: CPT | Mod: PN | Performed by: PHYSICAL THERAPIST

## 2020-10-20 PROCEDURE — 97110 THERAPEUTIC EXERCISES: CPT | Mod: PN | Performed by: PHYSICAL THERAPIST

## 2020-10-20 NOTE — PATIENT INSTRUCTIONS
Continue deep breathing daily for 5-10 min. Consider guided mediation through [facility].    Continue to improve bowel with UC diet and limiting common food sensitivities    Mindful of PF mm activity     Compassion towards vagina- pretty picture and positive affirmations

## 2020-10-20 NOTE — PROGRESS NOTES
Pelvic Health Physical Therapy   Treatment Note     Name: Elvia Gill [facility] Number: [id number]    Therapy Diagnosis:   Encounter Diagnosis   Name Primary?    Dyspareunia in female Yes     Physician: Gaston James III, *    Visit Date: 10/20/2020    Physician Orders: PT Eval and Treat   Medical Diagnosis from Referral:   Evaluation Date: 9/29/2020  Authorization Period Expiration: 9/29/20-12/31/20  Plan of Care Expiration: 12/31/20  Visit # / Visits authorized: 3/20     Cancelled Visits: 0  No Show Visits: 0    Time In: 130  Time Out: 220  Total Billable Time: 50 minutes    Precautions: Standard    Subjective     Pt reports: I have been under a lot of stress since last visit with friend being dx with cancer and mid term exams right now. My vagina is annoying because it gives me pain nad yeast infections and is very sensitive  She was compliant with home exercise program.  Response to previous treatment: 0  Functional change: 0    Pain: 0/10  Location: 0      Objective       Elvia received the following manual therapy techniques: to develop flexibility, extensibility and relaxation of PF mm for 30 minutes including: soft tissue mobilization of lavator ani and superficial mm B and scar mobilization of levator ani- NT    NMR: 30 min: with SEMG external electrodes: PF mm training for contract and relaxation with VC and TC 15 min and . Deep breathing with body scan 15 min        Home Exercises Provided and Patient Education Provided     Education provided: 20 min with deep breathing exercise today  - pain science- stress from relationship as a cause for pelvic pain due to flight or flight of sympathetic nervous system. continue counceling   Discussed progression of plan of care with patient; educated pt in activity modification; reviewed HEP with pt. Pt demonstrated and verbalized understanding of all instruction and was provided with a handout of HEP (see Patient Instructions).  - deep breathing to  calm nervous system    Written Home Exercises Provided: Patient instructed to cont prior HEP.  Exercises were reviewed and Elvia was able to demonstrate them prior to the end of the session.  Elvia demonstrated good  understanding of the education provided.     See EMR under Patient Instructions for exercises provided prior visit.    Assessment     Pt demo 3-6MV resting tone of pelvic floor mm that reduced to 1-2MV after body scan mediation. Max contraction at 15 MV that increased to 30MV after relaxation mediatation. PF mm with hesitancy in relaxing after contraction that improved y the end of the SEMG session  Elvia is progressing well towards her goals.   Pt prognosis is Good.     Pt will continue to benefit from skilled outpatient physical therapy to address the deficits listed in the problem list box on initial evaluation, provide pt/family education and to maximize pt's level of independence in the home and community environment.     Pt's spiritual, cultural and educational needs considered and pt agreeable to plan of care and goals.     Anticipated barriers to physical therapy: 0    Goals: PT to have intimacy through intercourse without pain and to reduce frequency of urinating during the night    Plan     Cont POC-     Jennifer Carter, PT

## 2020-10-27 ENCOUNTER — CLINICAL SUPPORT (OUTPATIENT)
Dept: REHABILITATION | Facility: HOSPITAL | Age: 20
End: 2020-10-27
Payer: COMMERCIAL

## 2020-10-27 DIAGNOSIS — R10.84 ABDOMINAL PAIN, GENERALIZED: ICD-10-CM

## 2020-10-27 DIAGNOSIS — N94.10 DYSPAREUNIA IN FEMALE: Primary | ICD-10-CM

## 2020-10-27 PROCEDURE — 97110 THERAPEUTIC EXERCISES: CPT | Mod: PN | Performed by: PHYSICAL THERAPIST

## 2020-10-27 PROCEDURE — 97112 NEUROMUSCULAR REEDUCATION: CPT | Mod: PN | Performed by: PHYSICAL THERAPIST

## 2020-10-27 NOTE — PROGRESS NOTES
"  Pelvic Health Physical Therapy   Treatment Note     Name: Elvia Gill [facility] Number: [id number]    Therapy Diagnosis:   Encounter Diagnoses   Name Primary?    Dyspareunia in female Yes    Abdominal pain, generalized      Physician: Gaston James III, *    Visit Date: 10/27/2020    Physician Orders: PT Eval and Treat   Medical Diagnosis from Referral:   Evaluation Date: 9/29/2020  Authorization Period Expiration: 9/29/20-12/31/20  Plan of Care Expiration: 12/31/20  Visit # / Visits authorized: 4/20     Cancelled Visits: 0  No Show Visits: 0    Time In: 245  Time Out: 330  Total Billable Time: 45 minutes    Precautions: Standard    Subjective     Pt reports:" I had intercourse but still have pain. I am on my period right now"  She was compliant with home exercise program.  Response to previous treatment: 0  Functional change: 0    Pain: 0/10  Location: 0      Objective       Elvia received the following manual therapy techniques: to develop flexibility, extensibility and relaxation of PF mm for 30 minutes including: soft tissue mobilization of lavator ani and superficial mm B and scar mobilization of levator ani- NT    NMR: 30 min: with SEMG external electrodes: PF mm training for contract and relaxation with VC and TC 15 min and . Deep breathing with body scan 15 min        Home Exercises Provided and Patient Education Provided 15 min/ therex     Education provided: 15 min with deep breathing exercise today  - pain science- stress from relationship as a cause for pelvic pain due to flight or flight of sympathetic nervous system. continue counceling   Discussed progression of plan of care with patient; educated pt in activity modification; reviewed HEP with pt. Pt demonstrated and verbalized understanding of all instruction and was provided with a handout of HEP (see Patient Instructions).  - deep breathing to calm nervous system, piriformis stretch, adductor stretch and happy baby daily for PF " mm relaxation    Written Home Exercises Provided: Patient instructed to cont prior HEP.  Exercises were reviewed and Elvia was able to demonstrate them prior to the end of the session.  Elvia demonstrated good  understanding of the education provided.     See EMR under Patient Instructions for exercises provided prior visit.    Assessment     Pt demo 3-6MV resting tone of pelvic floor mm that reduced to 1-2MV after body scan mediation. Max contraction at 15 MV that increased to 30MV after relaxation mediatation. PF mm with hesitancy in relaxing after contraction that improved y the end of the SEMG session  Elvia is progressing well towards her goals.   Pt prognosis is Good.     Pt will continue to benefit from skilled outpatient physical therapy to address the deficits listed in the problem list box on initial evaluation, provide pt/family education and to maximize pt's level of independence in the home and community environment.     Pt's spiritual, cultural and educational needs considered and pt agreeable to plan of care and goals.     Anticipated barriers to physical therapy: 0    Goals: PT to have intimacy through intercourse without pain and to reduce frequency of urinating during the night    Plan     Cont POC-     Jennifer Carter, PT

## 2020-10-30 ENCOUNTER — PATIENT MESSAGE (OUTPATIENT)
Dept: REHABILITATION | Facility: HOSPITAL | Age: 20
End: 2020-10-30

## 2020-11-03 ENCOUNTER — CLINICAL SUPPORT (OUTPATIENT)
Dept: REHABILITATION | Facility: HOSPITAL | Age: 20
End: 2020-11-03
Payer: COMMERCIAL

## 2020-11-03 DIAGNOSIS — R10.84 ABDOMINAL PAIN, GENERALIZED: ICD-10-CM

## 2020-11-03 DIAGNOSIS — R10.2 PELVIC PAIN IN FEMALE: ICD-10-CM

## 2020-11-03 DIAGNOSIS — N94.10 DYSPAREUNIA IN FEMALE: Primary | ICD-10-CM

## 2020-11-03 PROCEDURE — 97112 NEUROMUSCULAR REEDUCATION: CPT | Mod: PN | Performed by: PHYSICAL THERAPIST

## 2020-11-03 PROCEDURE — 97140 MANUAL THERAPY 1/> REGIONS: CPT | Mod: PN | Performed by: PHYSICAL THERAPIST

## 2020-11-05 PROBLEM — R10.2 PELVIC PAIN IN FEMALE: Status: ACTIVE | Noted: 2020-11-05

## 2020-11-05 NOTE — PROGRESS NOTES
"  Pelvic Health Physical Therapy   Treatment Note     Name: Elvia Gill [facility] Number: [id number]    Therapy Diagnosis:   Encounter Diagnoses   Name Primary?    Dyspareunia in female Yes    Abdominal pain, generalized     Pelvic pain in female      Physician: Gaston James III, *    Visit Date: 11/3/2020    Physician Orders: PT Eval and Treat   Medical Diagnosis from Referral:   Evaluation Date: 9/29/2020  Authorization Period Expiration: 9/29/20-12/31/20  Plan of Care Expiration: 12/31/20  Visit # / Visits authorized: 5/20     Cancelled Visits: 0  No Show Visits: 0    Time In: 230  Time Out: 225  Total Billable Time: 55 minutes    Precautions: Standard    Subjective     Pt reports:" I had intercourse but still have pain.  She was compliant with home exercise program.  Response to previous treatment: 0  Functional change: 0    Pain: 0/10  Location: 0      Objective       Elvia received the following manual therapy techniques: to develop flexibility, extensibility and relaxation of PF mm for 30 minutes including: soft tissue mobilization of lavator ani and superficial mm B and scar mobilization of levator ani- NT    NMR: 25 min: with SEMG external electrodes: PF mm training for contract and relaxation with VC and TC 15 min and . Deep breathing with body scan 15 min        Home Exercises Provided and Patient Education Provided- NT    Education provided: last visit  - pain science- stress from relationship as a cause for pelvic pain due to flight or flight of sympathetic nervous system. continue counceling   Discussed progression of plan of care with patient; educated pt in activity modification; reviewed HEP with pt. Pt demonstrated and verbalized understanding of all instruction and was provided with a handout of HEP (see Patient Instructions).  - deep breathing to calm nervous system, piriformis stretch, adductor stretch and happy baby daily for PF mm relaxation    Written Home Exercises " Provided: Patient instructed to cont prior HEP.  Exercises were reviewed and Elvia was able to demonstrate them prior to the end of the session.  Elvia demonstrated good  understanding of the education provided.     See EMR under Patient Instructions for exercises provided prior visit.    Assessment     Pt demo 3-6MV resting tone of pelvic floor mm that reduced to 1-2MV after body scan mediation. Max contraction at 15 MV that increased to 30MV after relaxation mediatation. PF mm pain with contract today that reduced after manual  Elvia is progressing well towards her goals.   Pt prognosis is Good.     Pt will continue to benefit from skilled outpatient physical therapy to address the deficits listed in the problem list box on initial evaluation, provide pt/family education and to maximize pt's level of independence in the home and community environment.     Pt's spiritual, cultural and educational needs considered and pt agreeable to plan of care and goals.     Anticipated barriers to physical therapy: 0    Goals: PT to have intimacy through intercourse without pain and to reduce frequency of urinating during the night    Plan     Cont POC-     Jennifer Carter, PT

## 2020-11-10 ENCOUNTER — CLINICAL SUPPORT (OUTPATIENT)
Dept: REHABILITATION | Facility: HOSPITAL | Age: 20
End: 2020-11-10
Payer: COMMERCIAL

## 2020-11-10 DIAGNOSIS — R10.84 ABDOMINAL PAIN, GENERALIZED: ICD-10-CM

## 2020-11-10 DIAGNOSIS — R10.2 PELVIC PAIN IN FEMALE: ICD-10-CM

## 2020-11-10 DIAGNOSIS — N94.10 DYSPAREUNIA IN FEMALE: Primary | ICD-10-CM

## 2020-11-10 PROCEDURE — 97110 THERAPEUTIC EXERCISES: CPT | Mod: PN | Performed by: PHYSICAL THERAPIST

## 2020-11-10 NOTE — PROGRESS NOTES
"  Pelvic Health Physical Therapy   Treatment Note     Name: Elvia Gill [facility] Number: [id number]    Therapy Diagnosis:   Encounter Diagnoses   Name Primary?    Dyspareunia in female Yes    Abdominal pain, generalized     Pelvic pain in female      Physician: Gaston James III, *    Visit Date: 11/10/2020    Physician Orders: PT Eval and Treat   Medical Diagnosis from Referral:   Evaluation Date: 9/29/2020  Authorization Period Expiration: 9/29/20-12/31/20  Plan of Care Expiration: 12/31/20  Visit # / Visits authorized: 6/20     Cancelled Visits: 0  No Show Visits: 0    Time In: 230  Time Out: 245  Total Billable Time: 15 minutes    Precautions: Standard    Subjective     Pt reports:"I had a stomach bug nad had a lot of pain for days. I have not had intercourse since last visit and I have irritated tissue near my vagina  She was compliant with home exercise program.  Response to previous treatment: 0  Functional change: 0    Pain: 0/10  Location: 0      Objective       Elvia received the following manual therapy techniques: to develop flexibility, extensibility and relaxation of PF mm for 30 minutes including: soft tissue mobilization of lavator ani and superficial mm B and scar mobilization of levator ani- NT    NMR: 25 min: with SEMG external electrodes: PF mm training for contract and relaxation with VC and TC 15 min and . Deep breathing with body scan 15 min        Home Exercises Provided and Patient Education Provided-     Education provided: last visit  - Aquaphor on irritated skin and coconut oil on skin for healing until she can see PCP. No intercourse while tissues healing    Written Home Exercises Provided: Patient instructed to cont prior HEP.  Exercises were reviewed and Elvia was able to demonstrate them prior to the end of the session.  Elvia demonstrated good  understanding of the education provided.     See EMR under Patient Instructions for exercises provided prior " visit.    Assessment     Treatment not performed today due irritated vulva. Pt did present with her dilators which can resume next visit  Elvia is progressing well towards her goals.   Pt prognosis is Good.     Pt will continue to benefit from skilled outpatient physical therapy to address the deficits listed in the problem list box on initial evaluation, provide pt/family education and to maximize pt's level of independence in the home and community environment.     Pt's spiritual, cultural and educational needs considered and pt agreeable to plan of care and goals.     Anticipated barriers to physical therapy: 0    Goals: PT to have intimacy through intercourse without pain and to reduce frequency of urinating during the night    Plan     Cont JAMSHID-     Jennifer Carter, PT

## 2020-11-11 ENCOUNTER — PATIENT MESSAGE (OUTPATIENT)
Dept: REHABILITATION | Facility: HOSPITAL | Age: 20
End: 2020-11-11

## 2020-11-17 ENCOUNTER — CLINICAL SUPPORT (OUTPATIENT)
Dept: REHABILITATION | Facility: HOSPITAL | Age: 20
End: 2020-11-17
Payer: COMMERCIAL

## 2020-11-17 DIAGNOSIS — R10.2 PELVIC PAIN IN FEMALE: ICD-10-CM

## 2020-11-17 PROCEDURE — 97112 NEUROMUSCULAR REEDUCATION: CPT | Mod: PN | Performed by: PHYSICAL THERAPIST

## 2020-11-17 PROCEDURE — 97140 MANUAL THERAPY 1/> REGIONS: CPT | Mod: PN | Performed by: PHYSICAL THERAPIST

## 2020-11-18 NOTE — PROGRESS NOTES
"  Pelvic Health Physical Therapy   Treatment Note     Name: Elvia [facility] Number: [id number]    Therapy Diagnosis:   Encounter Diagnosis   Name Primary?    Pelvic pain in female      Physician: Gaston James III, *    Visit Date: 11/17/2020    Physician Orders: PT Eval and Treat   Medical Diagnosis from Referral:   Evaluation Date: 9/29/2020  Authorization Period Expiration: 9/29/20-12/31/20  Plan of Care Expiration: 12/31/20  Visit # / Visits authorized: 6/20     Cancelled Visits: 0  No Show Visits: 0    Time In: 230  Time Out: 325  Total Billable Time: 55 minutes    Precautions: Standard    Subjective     Pt reports:" I think my GI does not agree with chic iona A cause I always get constipated after eating it. I still have some vulva sensitivity but I had intercourse with no pain however I was drinking  She was compliant with home exercise program.  Response to previous treatment: 0  Functional change: 0    Pain: 0/10  Location: 0      Objective       Elvia received the following manual therapy techniques: to develop flexibility, extensibility and relaxation of PF mm for 30 minutes including: soft tissue mobilization of lavator ani and superficial mm B and scar mobilization of levator ani-    NMR: 25 min: with SEMG external electrodes: PF mm training for contract and relaxation with VC and TC 15 min and . Deep breathing with body scan 15 min        Home Exercises Provided and Patient Education Provided-     Education provided: last visit  - Aquaphor on irritated skin and coconut oil on skin for healing until she can see PCP. No intercourse while tissues healing    Written Home Exercises Provided: Patient instructed to cont prior HEP.  Exercises were reviewed and Elvia was able to demonstrate them prior to the end of the session.  Elvia demonstrated good  understanding of the education provided.     See EMR under Patient Instructions for exercises provided prior visit.    Assessment "     Moderate restriction in R PF superficial and mid layers today at about 7 [address] is progressing well towards her goals.   Pt prognosis is Good.     Pt will continue to benefit from skilled outpatient physical therapy to address the deficits listed in the problem list box on initial evaluation, provide pt/family education and to maximize pt's level of independence in the home and community environment.     Pt's spiritual, cultural and educational needs considered and pt agreeable to plan of care and goals.     Anticipated barriers to physical therapy: 0    Goals: PT to have intimacy through intercourse without pain and to reduce frequency of urinating during the night    Plan     Cont POC-     Jennifer Carter, PT

## 2020-11-20 ENCOUNTER — PATIENT MESSAGE (OUTPATIENT)
Dept: OBSTETRICS AND GYNECOLOGY | Facility: CLINIC | Age: 20
End: 2020-11-20

## 2020-11-30 ENCOUNTER — PATIENT OUTREACH (OUTPATIENT)
Dept: ADMINISTRATIVE | Facility: OTHER | Age: 20
End: 2020-11-30

## 2020-12-01 ENCOUNTER — OFFICE VISIT (OUTPATIENT)
Dept: OBSTETRICS AND GYNECOLOGY | Facility: CLINIC | Age: 20
End: 2020-12-01
Attending: OBSTETRICS & GYNECOLOGY
Payer: COMMERCIAL

## 2020-12-01 VITALS
DIASTOLIC BLOOD PRESSURE: 90 MMHG | HEIGHT: 65 IN | BODY MASS INDEX: 27.18 KG/M2 | SYSTOLIC BLOOD PRESSURE: 140 MMHG | WEIGHT: 163.13 LBS

## 2020-12-01 DIAGNOSIS — K58.9 IRRITABLE BOWEL SYNDROME, UNSPECIFIED TYPE: ICD-10-CM

## 2020-12-01 DIAGNOSIS — N94.10 DYSPAREUNIA IN FEMALE: ICD-10-CM

## 2020-12-01 DIAGNOSIS — B37.31 CANDIDAL VULVOVAGINITIS: Primary | ICD-10-CM

## 2020-12-01 DIAGNOSIS — N76.0 RECURRENT VAGINITIS: ICD-10-CM

## 2020-12-01 PROCEDURE — 99214 PR OFFICE/OUTPT VISIT, EST, LEVL IV, 30-39 MIN: ICD-10-PCS | Mod: S$GLB,,, | Performed by: OBSTETRICS & GYNECOLOGY

## 2020-12-01 PROCEDURE — 1126F AMNT PAIN NOTED NONE PRSNT: CPT | Mod: S$GLB,,, | Performed by: OBSTETRICS & GYNECOLOGY

## 2020-12-01 PROCEDURE — 3008F BODY MASS INDEX DOCD: CPT | Mod: CPTII,S$GLB,, | Performed by: OBSTETRICS & GYNECOLOGY

## 2020-12-01 PROCEDURE — 99214 OFFICE O/P EST MOD 30 MIN: CPT | Mod: S$GLB,,, | Performed by: OBSTETRICS & GYNECOLOGY

## 2020-12-01 PROCEDURE — 99999 PR PBB SHADOW E&M-EST. PATIENT-LVL II: CPT | Mod: PBBFAC,,, | Performed by: OBSTETRICS & GYNECOLOGY

## 2020-12-01 PROCEDURE — 87210 SMEAR WET MOUNT SALINE/INK: CPT | Mod: QW,S$GLB,, | Performed by: OBSTETRICS & GYNECOLOGY

## 2020-12-01 PROCEDURE — 87106 FUNGI IDENTIFICATION YEAST: CPT

## 2020-12-01 PROCEDURE — 1126F PR PAIN SEVERITY QUANTIFIED, NO PAIN PRESENT: ICD-10-PCS | Mod: S$GLB,,, | Performed by: OBSTETRICS & GYNECOLOGY

## 2020-12-01 PROCEDURE — 3008F PR BODY MASS INDEX (BMI) DOCUMENTED: ICD-10-PCS | Mod: CPTII,S$GLB,, | Performed by: OBSTETRICS & GYNECOLOGY

## 2020-12-01 PROCEDURE — 87210 PR  SMEAR,STAIN,WET MNT,INTERP: ICD-10-PCS | Mod: QW,S$GLB,, | Performed by: OBSTETRICS & GYNECOLOGY

## 2020-12-01 PROCEDURE — 87102 FUNGUS ISOLATION CULTURE: CPT

## 2020-12-01 PROCEDURE — 99999 PR PBB SHADOW E&M-EST. PATIENT-LVL II: ICD-10-PCS | Mod: PBBFAC,,, | Performed by: OBSTETRICS & GYNECOLOGY

## 2020-12-01 RX ORDER — DEXTROAMPHETAMINE SACCHARATE, AMPHETAMINE ASPARTATE MONOHYDRATE, DEXTROAMPHETAMINE SULFATE AND AMPHETAMINE SULFATE 5; 5; 5; 5 MG/1; MG/1; MG/1; MG/1
20 CAPSULE, EXTENDED RELEASE ORAL EVERY MORNING
COMMUNITY
End: 2020-12-08

## 2020-12-01 RX ORDER — DROSPIRENONE AND ETHINYL ESTRADIOL 0.03MG-3MG
1 KIT ORAL DAILY
COMMUNITY
End: 2021-03-25

## 2020-12-01 RX ORDER — TRAZODONE HYDROCHLORIDE 50 MG/1
TABLET ORAL
COMMUNITY
Start: 2020-11-19 | End: 2022-03-11

## 2020-12-01 NOTE — PROGRESS NOTES
Subjective:     Patient ID: Elvia Carr is a 20 y.o. female.     Chief Complaint: Vaginal Itching and Vaginal Discharge     History of Present Illness: This patient is a 20 y.o. female, who presents to the GYN Vulva clinic for evaluation of vulvar discomfort.  Has a history of Candida infection which were treated with Diflucan is a boric acid.  Her problems with dyspareunia have improved since beginning pelvic floor physical therapy.  She has IBS and whenever she gets a flare she develops vulvovaginal symptoms.  The symptoms tender go away when the IBS is controlled.      Patient's last menstrual period was 11/23/2020.    Review of Systems    GENERAL: No fever, chills, fatigability or weightchange  SKIN: No rashes, itching or changes in color or texture of skin.  HEAD: No headaches or recent head trauma.  EYES: Visual acuity fine. No photophobia,r diplopia.  EARS: Denies earache or vertigo  NOSE: No loss of smell, no epistaxis or postnasal drip.  MOUTH & THROAT: No hoarseness or change in voice.   NODES: Denies swollen glands.  CHEST: Denies WOLFF, cyanosis, wheezing, cough and sputum production.  CARDIOVASCULAR: Denies chest pain, PND, orthopnea or reduced exercise tolerance.  ABDOMEN: Appetite fine. No weight loss. bloating, Denies diarrhea, abdominal pain, hematemesis or blood in stool.  URINARY: No flank pain, dysuria or hematuria.  PERIPHERAL VASCULAR: No claudication or cyanosis.Varicosities  MUSCULOSKELETAL: No joint stiffness or swelling. Denies back pain.muscle aches  NEUROLOGIC: No history of seizures, paralysis, alteration of gait or coordination.       Objective:       Physical Exam     APPEARANCE: Well nourished, well developed, in no acute distress.    GENITOURINARY:  Vulva: No lesions. Normal female genital architecture.  Urethral Meatus: Normal size and location, no lesions, no prolapse.  Urethra: No masses, tenderness, prolapse or scarring.  Vagina:  Moist with rugae, no discharge, no  significant cystocele or rectocele.  Cervix: No lesions, normal diameter, no stenosis, no cervical motion tenderness. .  Uterus: 6 week size, regular shape, mobile, non-tender, normal position, good support.  Adnexa: No masses, tenderness or CDS nodularity.  Anus Perineum: No lesions, no relaxation, no external hemorrhoids.  Abdomen: No masses, tenderness, hernia or ascites, no hepatasplenomegaly  Skin: No rashes, lesions, ulcers, acne, hirsutism.  Peripheral/lower extremities: No edema, erythema or tenderness.  Lymphatic: No axillary, neck or groin nodes palp.  Mental Status: Alert, oriented x 3, normal affect and mood.          @PROCEDURE:@  Wet Prep:  pH = 4.0  -WBCS = occasional  -Lactobacilli = present  -BV = Amsel negative  -Candida = Hyphae none seen  -Trichomnas = none seen  -Cells- Basal and Parabasal, Superficial: Maturation:  Superficial cells predominate  -Impression:  Negative wet prep  -Treatment:  None indicated  -[facility] p.r.n..         Assessment:      1. History of Candidal vulvovaginitis    2. Recurrent vaginitis    3. Irritable bowel syndrome, unspecified type    4. Dyspareunia in female               Plan:  1.  Candida culture of the vagina taken today.  2.  The wet prep was not indicative of any vulvovaginal infection.  3.  The patient will be notified if the Candida culture is positive.  4.  Return to clinic p.r.n.                  No orders of the defined types were placed in this encounter.

## 2020-12-03 ENCOUNTER — TELEPHONE (OUTPATIENT)
Dept: OBSTETRICS AND GYNECOLOGY | Facility: CLINIC | Age: 20
End: 2020-12-03

## 2020-12-03 DIAGNOSIS — B37.31 CANDIDAL VULVOVAGINITIS: Primary | ICD-10-CM

## 2020-12-03 RX ORDER — FLUCONAZOLE 200 MG/1
200 TABLET ORAL DAILY
Qty: 5 TABLET | Refills: 0 | Status: SHIPPED | OUTPATIENT
Start: 2020-12-03 | End: 2020-12-08

## 2020-12-03 NOTE — TELEPHONE ENCOUNTER
Gave patient her results of yeast and informed her of the medication and how to take it. Patient had no further questions

## 2020-12-06 ENCOUNTER — PATIENT MESSAGE (OUTPATIENT)
Dept: FAMILY MEDICINE | Facility: CLINIC | Age: 20
End: 2020-12-06

## 2020-12-08 RX ORDER — DEXTROAMPHETAMINE SACCHARATE, AMPHETAMINE ASPARTATE, DEXTROAMPHETAMINE SULFATE AND AMPHETAMINE SULFATE 5; 5; 5; 5 MG/1; MG/1; MG/1; MG/1
1 TABLET ORAL 2 TIMES DAILY
Qty: 60 TABLET | Refills: 0 | Status: SHIPPED | OUTPATIENT
Start: 2020-12-08 | End: 2021-02-17 | Stop reason: SDUPTHER

## 2021-01-04 LAB — FUNGUS SPEC CULT: ABNORMAL

## 2021-01-06 ENCOUNTER — PATIENT OUTREACH (OUTPATIENT)
Dept: ADMINISTRATIVE | Facility: OTHER | Age: 21
End: 2021-01-06

## 2021-01-07 ENCOUNTER — OFFICE VISIT (OUTPATIENT)
Dept: OBSTETRICS AND GYNECOLOGY | Facility: CLINIC | Age: 21
End: 2021-01-07
Attending: OBSTETRICS & GYNECOLOGY
Payer: COMMERCIAL

## 2021-01-07 VITALS
BODY MASS INDEX: 27.29 KG/M2 | SYSTOLIC BLOOD PRESSURE: 104 MMHG | HEIGHT: 65 IN | DIASTOLIC BLOOD PRESSURE: 72 MMHG | WEIGHT: 163.81 LBS

## 2021-01-07 DIAGNOSIS — N76.0 RECURRENT VAGINITIS: ICD-10-CM

## 2021-01-07 DIAGNOSIS — L29.2 VULVAR ITCHING: Primary | ICD-10-CM

## 2021-01-07 DIAGNOSIS — B37.31 CANDIDIASIS OF VULVA AND VAGINA: ICD-10-CM

## 2021-01-07 DIAGNOSIS — K51.211 ULCERATIVE PROCTITIS WITH RECTAL BLEEDING: ICD-10-CM

## 2021-01-07 PROCEDURE — 1126F PR PAIN SEVERITY QUANTIFIED, NO PAIN PRESENT: ICD-10-PCS | Mod: S$GLB,,, | Performed by: OBSTETRICS & GYNECOLOGY

## 2021-01-07 PROCEDURE — 1126F AMNT PAIN NOTED NONE PRSNT: CPT | Mod: S$GLB,,, | Performed by: OBSTETRICS & GYNECOLOGY

## 2021-01-07 PROCEDURE — 99214 PR OFFICE/OUTPT VISIT, EST, LEVL IV, 30-39 MIN: ICD-10-PCS | Mod: S$GLB,,, | Performed by: OBSTETRICS & GYNECOLOGY

## 2021-01-07 PROCEDURE — 87210 SMEAR WET MOUNT SALINE/INK: CPT | Mod: QW,S$GLB,, | Performed by: OBSTETRICS & GYNECOLOGY

## 2021-01-07 PROCEDURE — 3008F PR BODY MASS INDEX (BMI) DOCUMENTED: ICD-10-PCS | Mod: CPTII,S$GLB,, | Performed by: OBSTETRICS & GYNECOLOGY

## 2021-01-07 PROCEDURE — 3008F BODY MASS INDEX DOCD: CPT | Mod: CPTII,S$GLB,, | Performed by: OBSTETRICS & GYNECOLOGY

## 2021-01-07 PROCEDURE — 87102 FUNGUS ISOLATION CULTURE: CPT

## 2021-01-07 PROCEDURE — 99999 PR PBB SHADOW E&M-EST. PATIENT-LVL III: CPT | Mod: PBBFAC,,, | Performed by: OBSTETRICS & GYNECOLOGY

## 2021-01-07 PROCEDURE — 87210 PR  SMEAR,STAIN,WET MNT,INTERP: ICD-10-PCS | Mod: QW,S$GLB,, | Performed by: OBSTETRICS & GYNECOLOGY

## 2021-01-07 PROCEDURE — 99999 PR PBB SHADOW E&M-EST. PATIENT-LVL III: ICD-10-PCS | Mod: PBBFAC,,, | Performed by: OBSTETRICS & GYNECOLOGY

## 2021-01-07 PROCEDURE — 99214 OFFICE O/P EST MOD 30 MIN: CPT | Mod: S$GLB,,, | Performed by: OBSTETRICS & GYNECOLOGY

## 2021-01-07 RX ORDER — TRIAMCINOLONE ACETONIDE 1 MG/G
OINTMENT TOPICAL
Qty: 30 G | Refills: 3 | Status: SHIPPED | OUTPATIENT
Start: 2021-01-07 | End: 2021-04-20

## 2021-02-08 LAB — FUNGUS SPEC CULT: NORMAL

## 2021-03-05 ENCOUNTER — OFFICE VISIT (OUTPATIENT)
Dept: DERMATOLOGY | Facility: CLINIC | Age: 21
End: 2021-03-05
Payer: COMMERCIAL

## 2021-03-05 DIAGNOSIS — L73.9 FOLLICULITIS: Primary | ICD-10-CM

## 2021-03-05 PROCEDURE — 99999 PR PBB SHADOW E&M-EST. PATIENT-LVL II: ICD-10-PCS | Mod: PBBFAC,,, | Performed by: STUDENT IN AN ORGANIZED HEALTH CARE EDUCATION/TRAINING PROGRAM

## 2021-03-05 PROCEDURE — 99999 PR PBB SHADOW E&M-EST. PATIENT-LVL II: CPT | Mod: PBBFAC,,, | Performed by: STUDENT IN AN ORGANIZED HEALTH CARE EDUCATION/TRAINING PROGRAM

## 2021-03-05 PROCEDURE — 99213 PR OFFICE/OUTPT VISIT, EST, LEVL III, 20-29 MIN: ICD-10-PCS | Mod: S$GLB,,, | Performed by: STUDENT IN AN ORGANIZED HEALTH CARE EDUCATION/TRAINING PROGRAM

## 2021-03-05 PROCEDURE — 99213 OFFICE O/P EST LOW 20 MIN: CPT | Mod: S$GLB,,, | Performed by: STUDENT IN AN ORGANIZED HEALTH CARE EDUCATION/TRAINING PROGRAM

## 2021-03-05 RX ORDER — DOXYCYCLINE 100 MG/1
100 CAPSULE ORAL DAILY
Qty: 90 CAPSULE | Refills: 0 | Status: SHIPPED | OUTPATIENT
Start: 2021-03-05 | End: 2021-05-26

## 2021-03-05 RX ORDER — MUPIROCIN 20 MG/G
OINTMENT TOPICAL DAILY
Qty: 30 G | Refills: 0 | Status: SHIPPED | OUTPATIENT
Start: 2021-03-05 | End: 2021-05-26

## 2021-03-06 ENCOUNTER — PATIENT MESSAGE (OUTPATIENT)
Dept: DERMATOLOGY | Facility: CLINIC | Age: 21
End: 2021-03-06

## 2021-04-14 ENCOUNTER — PATIENT OUTREACH (OUTPATIENT)
Dept: ADMINISTRATIVE | Facility: OTHER | Age: 21
End: 2021-04-14

## 2021-04-16 ENCOUNTER — OFFICE VISIT (OUTPATIENT)
Dept: DERMATOLOGY | Facility: CLINIC | Age: 21
End: 2021-04-16
Payer: COMMERCIAL

## 2021-04-16 DIAGNOSIS — L73.9 FOLLICULITIS: Primary | ICD-10-CM

## 2021-04-16 PROCEDURE — 1126F AMNT PAIN NOTED NONE PRSNT: CPT | Mod: S$GLB,,, | Performed by: STUDENT IN AN ORGANIZED HEALTH CARE EDUCATION/TRAINING PROGRAM

## 2021-04-16 PROCEDURE — 1126F PR PAIN SEVERITY QUANTIFIED, NO PAIN PRESENT: ICD-10-PCS | Mod: S$GLB,,, | Performed by: STUDENT IN AN ORGANIZED HEALTH CARE EDUCATION/TRAINING PROGRAM

## 2021-04-16 PROCEDURE — 99999 PR PBB SHADOW E&M-EST. PATIENT-LVL III: ICD-10-PCS | Mod: PBBFAC,,, | Performed by: STUDENT IN AN ORGANIZED HEALTH CARE EDUCATION/TRAINING PROGRAM

## 2021-04-16 PROCEDURE — 99213 OFFICE O/P EST LOW 20 MIN: CPT | Mod: S$GLB,,, | Performed by: STUDENT IN AN ORGANIZED HEALTH CARE EDUCATION/TRAINING PROGRAM

## 2021-04-16 PROCEDURE — 99213 PR OFFICE/OUTPT VISIT, EST, LEVL III, 20-29 MIN: ICD-10-PCS | Mod: S$GLB,,, | Performed by: STUDENT IN AN ORGANIZED HEALTH CARE EDUCATION/TRAINING PROGRAM

## 2021-04-16 PROCEDURE — 99999 PR PBB SHADOW E&M-EST. PATIENT-LVL III: CPT | Mod: PBBFAC,,, | Performed by: STUDENT IN AN ORGANIZED HEALTH CARE EDUCATION/TRAINING PROGRAM

## 2021-04-20 RX ORDER — DEXTROAMPHETAMINE SACCHARATE, AMPHETAMINE ASPARTATE, DEXTROAMPHETAMINE SULFATE AND AMPHETAMINE SULFATE 5; 5; 5; 5 MG/1; MG/1; MG/1; MG/1
1 TABLET ORAL 2 TIMES DAILY
Qty: 60 TABLET | Refills: 0 | OUTPATIENT
Start: 2021-04-20 | End: 2021-05-20

## 2021-04-20 RX ORDER — DULOXETIN HYDROCHLORIDE 60 MG/1
60 CAPSULE, DELAYED RELEASE ORAL DAILY
COMMUNITY
Start: 2021-03-08

## 2021-04-23 RX ORDER — DEXTROAMPHETAMINE SACCHARATE, AMPHETAMINE ASPARTATE, DEXTROAMPHETAMINE SULFATE AND AMPHETAMINE SULFATE 5; 5; 5; 5 MG/1; MG/1; MG/1; MG/1
1 TABLET ORAL 2 TIMES DAILY
Qty: 60 TABLET | Refills: 0 | Status: CANCELLED | OUTPATIENT
Start: 2021-04-23 | End: 2021-05-23

## 2021-04-26 ENCOUNTER — PATIENT MESSAGE (OUTPATIENT)
Dept: FAMILY MEDICINE | Facility: CLINIC | Age: 21
End: 2021-04-26

## 2021-04-26 DIAGNOSIS — F32.A ANXIETY AND DEPRESSION: Primary | ICD-10-CM

## 2021-04-26 DIAGNOSIS — F41.9 ANXIETY AND DEPRESSION: Primary | ICD-10-CM

## 2021-04-26 DIAGNOSIS — F90.0 ADHD, PREDOMINANTLY INATTENTIVE TYPE: ICD-10-CM

## 2021-05-04 ENCOUNTER — PATIENT MESSAGE (OUTPATIENT)
Dept: FAMILY MEDICINE | Facility: CLINIC | Age: 21
End: 2021-05-04

## 2021-05-26 ENCOUNTER — LAB VISIT (OUTPATIENT)
Dept: LAB | Facility: HOSPITAL | Age: 21
End: 2021-05-26
Attending: OBSTETRICS & GYNECOLOGY
Payer: COMMERCIAL

## 2021-05-26 ENCOUNTER — OFFICE VISIT (OUTPATIENT)
Dept: OBSTETRICS AND GYNECOLOGY | Facility: CLINIC | Age: 21
End: 2021-05-26
Payer: COMMERCIAL

## 2021-05-26 VITALS
BODY MASS INDEX: 28.03 KG/M2 | SYSTOLIC BLOOD PRESSURE: 122 MMHG | WEIGHT: 168.44 LBS | DIASTOLIC BLOOD PRESSURE: 80 MMHG

## 2021-05-26 DIAGNOSIS — Z30.41 ENCOUNTER FOR SURVEILLANCE OF CONTRACEPTIVE PILLS: ICD-10-CM

## 2021-05-26 DIAGNOSIS — Z01.419 VISIT FOR GYNECOLOGIC EXAMINATION: Primary | ICD-10-CM

## 2021-05-26 DIAGNOSIS — Z11.3 VENEREAL DISEASE SCREENING: ICD-10-CM

## 2021-05-26 PROCEDURE — 86803 HEPATITIS C AB TEST: CPT | Performed by: OBSTETRICS & GYNECOLOGY

## 2021-05-26 PROCEDURE — 99999 PR PBB SHADOW E&M-EST. PATIENT-LVL III: CPT | Mod: PBBFAC,,, | Performed by: OBSTETRICS & GYNECOLOGY

## 2021-05-26 PROCEDURE — 87481 CANDIDA DNA AMP PROBE: CPT | Mod: 59 | Performed by: OBSTETRICS & GYNECOLOGY

## 2021-05-26 PROCEDURE — 86703 HIV-1/HIV-2 1 RESULT ANTBDY: CPT | Performed by: OBSTETRICS & GYNECOLOGY

## 2021-05-26 PROCEDURE — 99999 PR PBB SHADOW E&M-EST. PATIENT-LVL III: ICD-10-PCS | Mod: PBBFAC,,, | Performed by: OBSTETRICS & GYNECOLOGY

## 2021-05-26 PROCEDURE — 87491 CHLMYD TRACH DNA AMP PROBE: CPT | Mod: 59 | Performed by: OBSTETRICS & GYNECOLOGY

## 2021-05-26 PROCEDURE — 86592 SYPHILIS TEST NON-TREP QUAL: CPT | Performed by: OBSTETRICS & GYNECOLOGY

## 2021-05-26 PROCEDURE — 1126F PR PAIN SEVERITY QUANTIFIED, NO PAIN PRESENT: ICD-10-PCS | Mod: S$GLB,,, | Performed by: OBSTETRICS & GYNECOLOGY

## 2021-05-26 PROCEDURE — 1126F AMNT PAIN NOTED NONE PRSNT: CPT | Mod: S$GLB,,, | Performed by: OBSTETRICS & GYNECOLOGY

## 2021-05-26 PROCEDURE — 36415 COLL VENOUS BLD VENIPUNCTURE: CPT | Performed by: OBSTETRICS & GYNECOLOGY

## 2021-05-26 PROCEDURE — 3008F BODY MASS INDEX DOCD: CPT | Mod: CPTII,S$GLB,, | Performed by: OBSTETRICS & GYNECOLOGY

## 2021-05-26 PROCEDURE — 87340 HEPATITIS B SURFACE AG IA: CPT | Performed by: OBSTETRICS & GYNECOLOGY

## 2021-05-26 PROCEDURE — 86780 TREPONEMA PALLIDUM: CPT | Performed by: OBSTETRICS & GYNECOLOGY

## 2021-05-26 PROCEDURE — 87591 N.GONORRHOEAE DNA AMP PROB: CPT | Mod: 59 | Performed by: OBSTETRICS & GYNECOLOGY

## 2021-05-26 PROCEDURE — 3008F PR BODY MASS INDEX (BMI) DOCUMENTED: ICD-10-PCS | Mod: CPTII,S$GLB,, | Performed by: OBSTETRICS & GYNECOLOGY

## 2021-05-26 PROCEDURE — 99395 PR PREVENTIVE VISIT,EST,18-39: ICD-10-PCS | Mod: S$GLB,,, | Performed by: OBSTETRICS & GYNECOLOGY

## 2021-05-26 PROCEDURE — 99395 PREV VISIT EST AGE 18-39: CPT | Mod: S$GLB,,, | Performed by: OBSTETRICS & GYNECOLOGY

## 2021-05-26 PROCEDURE — 88175 CYTOPATH C/V AUTO FLUID REDO: CPT | Performed by: OBSTETRICS & GYNECOLOGY

## 2021-05-26 RX ORDER — DROSPIRENONE AND ETHINYL ESTRADIOL 0.03MG-3MG
1 KIT ORAL DAILY
Qty: 84 TABLET | Refills: 4 | Status: SHIPPED | OUTPATIENT
Start: 2021-05-26 | End: 2022-07-28

## 2021-05-27 LAB
HBV SURFACE AG SERPL QL IA: NEGATIVE
HCV AB SERPL QL IA: NEGATIVE
HIV 1+2 AB+HIV1 P24 AG SERPL QL IA: NEGATIVE
RPR SER QL: NORMAL
T PALLIDUM AB SER QL IF: NORMAL

## 2021-05-28 LAB
BACTERIAL VAGINOSIS DNA: NEGATIVE
CANDIDA GLABRATA DNA: NEGATIVE
CANDIDA KRUSEI DNA: NEGATIVE
CANDIDA RRNA VAG QL PROBE: NEGATIVE
T VAGINALIS RRNA GENITAL QL PROBE: NEGATIVE

## 2021-05-31 LAB
C TRACH DNA SPEC QL NAA+PROBE: NOT DETECTED
N GONORRHOEA DNA SPEC QL NAA+PROBE: NOT DETECTED

## 2021-06-01 LAB
CLINICAL INFO: NORMAL
CYTO CVX: NORMAL
CYTOLOGIST CVX/VAG CYTO: NORMAL
CYTOLOGY CMNT CVX/VAG CYTO-IMP: NORMAL
CYTOLOGY PAP THIN PREP EXPLANATION: NORMAL
DATE OF PREVIOUS PAP: NORMAL
DATE PREVIOUS BX: NO
LMP START DATE: NORMAL
SPECIMEN SOURCE CVX/VAG CYTO: NORMAL
STAT OF ADQ CVX/VAG CYTO-IMP: NORMAL

## 2021-06-06 ENCOUNTER — PATIENT MESSAGE (OUTPATIENT)
Dept: OBSTETRICS AND GYNECOLOGY | Facility: CLINIC | Age: 21
End: 2021-06-06

## 2021-07-15 ENCOUNTER — PATIENT MESSAGE (OUTPATIENT)
Dept: INTERNAL MEDICINE | Facility: CLINIC | Age: 21
End: 2021-07-15

## 2021-12-27 ENCOUNTER — PATIENT MESSAGE (OUTPATIENT)
Dept: ADMINISTRATIVE | Facility: OTHER | Age: 21
End: 2021-12-27
Payer: COMMERCIAL

## 2022-01-04 ENCOUNTER — PATIENT MESSAGE (OUTPATIENT)
Dept: ADMINISTRATIVE | Facility: OTHER | Age: 22
End: 2022-01-04
Payer: COMMERCIAL

## 2022-01-05 ENCOUNTER — OFFICE VISIT (OUTPATIENT)
Dept: URGENT CARE | Facility: CLINIC | Age: 22
End: 2022-01-05
Payer: COMMERCIAL

## 2022-01-05 VITALS
RESPIRATION RATE: 18 BRPM | SYSTOLIC BLOOD PRESSURE: 118 MMHG | TEMPERATURE: 98 F | HEIGHT: 65 IN | HEART RATE: 92 BPM | BODY MASS INDEX: 28.32 KG/M2 | DIASTOLIC BLOOD PRESSURE: 78 MMHG | OXYGEN SATURATION: 99 % | WEIGHT: 170 LBS

## 2022-01-05 DIAGNOSIS — R05.9 COUGH: ICD-10-CM

## 2022-01-05 DIAGNOSIS — U07.1 COVID-19 VIRUS INFECTION: Primary | ICD-10-CM

## 2022-01-05 LAB
CTP QC/QA: YES
SARS-COV-2 RDRP RESP QL NAA+PROBE: POSITIVE

## 2022-01-05 PROCEDURE — 99213 OFFICE O/P EST LOW 20 MIN: CPT | Mod: S$GLB,,, | Performed by: NURSE PRACTITIONER

## 2022-01-05 PROCEDURE — 99213 PR OFFICE/OUTPT VISIT, EST, LEVL III, 20-29 MIN: ICD-10-PCS | Mod: S$GLB,,, | Performed by: NURSE PRACTITIONER

## 2022-01-05 PROCEDURE — 3078F PR MOST RECENT DIASTOLIC BLOOD PRESSURE < 80 MM HG: ICD-10-PCS | Mod: CPTII,S$GLB,, | Performed by: NURSE PRACTITIONER

## 2022-01-05 PROCEDURE — 3074F SYST BP LT 130 MM HG: CPT | Mod: CPTII,S$GLB,, | Performed by: NURSE PRACTITIONER

## 2022-01-05 PROCEDURE — 3074F PR MOST RECENT SYSTOLIC BLOOD PRESSURE < 130 MM HG: ICD-10-PCS | Mod: CPTII,S$GLB,, | Performed by: NURSE PRACTITIONER

## 2022-01-05 PROCEDURE — 3008F PR BODY MASS INDEX (BMI) DOCUMENTED: ICD-10-PCS | Mod: CPTII,S$GLB,, | Performed by: NURSE PRACTITIONER

## 2022-01-05 PROCEDURE — 1160F PR REVIEW ALL MEDS BY PRESCRIBER/CLIN PHARMACIST DOCUMENTED: ICD-10-PCS | Mod: CPTII,S$GLB,, | Performed by: NURSE PRACTITIONER

## 2022-01-05 PROCEDURE — U0002: ICD-10-PCS | Mod: QW,S$GLB,, | Performed by: NURSE PRACTITIONER

## 2022-01-05 PROCEDURE — 1160F RVW MEDS BY RX/DR IN RCRD: CPT | Mod: CPTII,S$GLB,, | Performed by: NURSE PRACTITIONER

## 2022-01-05 PROCEDURE — U0002 COVID-19 LAB TEST NON-CDC: HCPCS | Mod: QW,S$GLB,, | Performed by: NURSE PRACTITIONER

## 2022-01-05 PROCEDURE — 3008F BODY MASS INDEX DOCD: CPT | Mod: CPTII,S$GLB,, | Performed by: NURSE PRACTITIONER

## 2022-01-05 PROCEDURE — 1159F MED LIST DOCD IN RCRD: CPT | Mod: CPTII,S$GLB,, | Performed by: NURSE PRACTITIONER

## 2022-01-05 PROCEDURE — 1159F PR MEDICATION LIST DOCUMENTED IN MEDICAL RECORD: ICD-10-PCS | Mod: CPTII,S$GLB,, | Performed by: NURSE PRACTITIONER

## 2022-01-05 PROCEDURE — 3078F DIAST BP <80 MM HG: CPT | Mod: CPTII,S$GLB,, | Performed by: NURSE PRACTITIONER

## 2022-01-05 NOTE — PATIENT INSTRUCTIONS
Use an antihistamine such as Claritin, Zyrtec or Allegra to dry you out.     Use pseudoephedrine (behind the counter) to decongest. Pseudoephedrine  30 mg up to 240 mg /day. It can raise your blood pressure and give you palpitations.    Use Flonase 1-2 sprays/nostril per day. It is a local acting steroid nasal spray, if you develop a bloody nose, stop using the medication immediately.    Use warm salt water gargles to ease your throat pain.  Hot tea helps soothe.     Use over-the-counter cough syrup such as Robitussin or Delsym to help with cough suppression    Over the counter you can use Tylenol (acetominophen) or Ibuprofen for your minor aches and pains as long as you have no contraindications.    Good nutrition. Lots of rest. Plenty of fluids    Must quarantine for 5 days from the start of symptoms    You must understand that you've received an Urgent Care treatment only and that you may be released before all your medical problems are known or treated. You, the patient, will arrange for follow up care as instructed.  Follow up with your PCP or specialty clinic as directed in the next 1-2 weeks if not improved or as needed.  You can call [phone] to schedule an appointment with the appropriate provider.  If your condition worsens we recommend that you receive another evaluation at the emergency room immediately or contact your primary medical clinics after hours call service to discuss your concerns.  Please return here or go to the Emergency Department for any concerns or worsening of condition.    Your test was POSITIVE for COVID-19 (coronavirus).       Please isolate yourself at home.  You may leave home and/or return to work once the following conditions are met:    If you were not hospitalized and are not moderately to severely immunocompromised:    More than 5 days since symptoms first appeared AND   More than 24 hours fever free without medications AND   Symptoms are improving   Continue to wear  a mask around others for 5 additional days.    If you were hospitalized OR are moderately to severely immunocompromised:   More than 20 days since symptoms first appeared   More than 24 hours fever free without medications   Symptoms have improved    If you had no symptoms but tested positive:   More than 5 days since the date of the first positive test (20 days if moderately to severely immunocompromised). If you develop symptoms, then use the guidelines above.   Continue to wear a mask around others for 5 additional days.      Contact Tracing    As one of the next steps, you will receive a call or text from the [place] Department of Health ([facility]) COVID-19 Tracing Team. See the contact information below so you know not to ignore the health departments call. It is important that you contact them back immediately so they can help.      Contact Tracer Number:  [phone]  Caller ID for most carriers: [place] Dept Health     What is contact tracing?  · Contact tracing is a process that helps identify everyone who has been in close contact with an infected person. Contact tracers let those people know they may have been exposed and guide them on next steps. Confidentiality is important for everyone; no one will be told who may have exposed them to the virus.  · Your involvement is important. The more we know about where and how this virus is spreading, the better chance we have at stopping it from spreading further.  What does exposure mean?  · Exposure means you have been within 6 feet for more than 15 minutes with a person who has or had COVID-19.  What kind of questions do the contact tracers ask?  · A contact tracer will confirm your basic contact information including name, address, phone number, and next of kin, as well as asking about any symptoms you may have had. Theyll also ask you how you think you may have gotten sick, such as places where you may have been exposed to the virus, and people you were  with. Those names will never be shared with anyone outside of that call, and will only be used to help trace and stop the spread of the virus.   I have privacy concerns. How will the state use my information?  · Your privacy about your health is important. All calls are completed using call centers that use the appropriate health privacy protection measures (HIPAA compliance), meaning that your patient information is safe. No one will ever ask you any questions related to immigration status. Your health comes first.   Do I have to participate?  · You do not have to participate, but we strongly encourage you to. Contact tracing can help us catch and control new outbreaks as theyre developing to keep your friends and family safe.   What if I dont hear from anyone?  · If you dont receive a call within 24 hours, you can call the number above right away to inquire about your status. That line is open from 8:00 am - 8:00 p.m., 7 days a week.  Contact tracing saves lives! Together, we have the power to beat this virus and keep our loved ones and neighbors safe.    For more information see CDC link below.      https://www.cdc.gov/coronavirus/2019-ncov/hcp/guidance-prevent-spread.html#precautions        Sources:  [facility], [facility] and [facility]           Sincerely,     Dorita Roth NP

## 2022-01-05 NOTE — LETTER
[address] [place] [address] ? [place], [address] ? Phone [phone] ? Fax [phone]           Return to Work/School    Patient: Elvia Carr  YOB: 2000   Date: 01/05/2022      To Whom It May Concern:     Elvia Carr was in contact with/seen in my office on 01/05/2022. COVID-19 is present in our communities across the Counts include 234 beds at the [facility]. Not all patients are eligible or appropriate to be tested. In this situation, your employee meets the following criteria:     Elvia Carr has met the criteria for COVID-19 testing and has a POSITIVE result. She can return to work once they are asymptomatic for 24 hours without the use of fever reducing medications AND at least five days from the start of symptoms (or from the first positive result if they have no symptoms).      If you have any questions or concerns, or if I can be of further assistance, please do not hesitate to contact me.     Sincerely,    Dorita Roth NP

## 2022-01-05 NOTE — PROGRESS NOTES
"Subjective:       Patient ID: Elvia Carr is a 21 y.o. female.    Vitals:  height is 5' 5" (1.651 m) and weight is 77.1 kg (170 lb). Her tympanic temperature is 97.6 °F (36.4 °C). Her blood pressure is 118/78 and her pulse is 92. Her respiration is 18 and oxygen saturation is 99%.     Chief Complaint: Nasal Congestion    Pt c/o a dry cough, body aches, congestion and a sore throat. Pt states sx began 01/02/22. Pt is vaccinated but not boosted. Pt was exposed to covid 12/29/21.     Cough  This is a new problem. The current episode started in the past 7 days. The problem has been gradually worsening. The cough is productive of sputum. Associated symptoms include myalgias, nasal congestion, postnasal drip and a sore throat. Pertinent negatives include no ear pain, fever or headaches. Nothing aggravates the symptoms. She has tried nothing for the symptoms. Her past medical history is significant for asthma. There is no history of environmental allergies.       Constitution: Negative for fever.   HENT: Positive for congestion, postnasal drip, sinus pain, sinus pressure and sore throat. Negative for ear pain.    Respiratory: Positive for cough. Negative for sputum production.    Gastrointestinal: Negative for nausea, vomiting and diarrhea.   Musculoskeletal: Positive for muscle ache.   Allergic/Immunologic: Negative for environmental allergies.   Neurological: Negative for headaches.       Objective:      Physical Exam   Constitutional: She is oriented to person, place, and time. She appears well-developed and well-nourished. She is cooperative.  Non-toxic appearance. She does not appear ill. No distress.   HENT:   Head: Normocephalic and atraumatic.   Ears:   Right Ear: Hearing, tympanic membrane, external ear and ear canal normal.   Left Ear: Hearing, tympanic membrane, external ear and ear canal normal.   Nose: Nose normal. No mucosal edema, rhinorrhea or nasal deformity. No epistaxis. Right sinus " exhibits no maxillary sinus tenderness and no frontal sinus tenderness. Left sinus exhibits no maxillary sinus tenderness and no frontal sinus tenderness.   Mouth/Throat: Uvula is midline, oropharynx is clear and moist and mucous membranes are normal. No trismus in the jaw. Normal dentition. No uvula swelling. No posterior oropharyngeal erythema.   Sinus congestion  PND      Comments: Sinus congestion  PND  Eyes: Conjunctivae and lids are normal. Right eye exhibits no discharge. Left eye exhibits no discharge. No scleral icterus.   Neck: Trachea normal and phonation normal. Neck supple.   Cardiovascular: Normal rate, regular rhythm, normal heart sounds, intact distal pulses and normal pulses.   Pulmonary/Chest: Effort normal and breath sounds normal. No respiratory distress.   Abdominal: Normal appearance and bowel sounds are normal. She exhibits no distension and no mass. Soft. There is no abdominal tenderness.   Musculoskeletal: Normal range of motion.         General: No deformity or edema. Normal range of motion.   Neurological: She is alert and oriented to person, place, and time. She exhibits normal muscle tone. Coordination normal.   Skin: Skin is warm, dry, intact, not diaphoretic and not pale.   Psychiatric: She has a normal mood and affect. Her speech is normal and behavior is normal. Judgment and thought content normal.   Nursing note and vitals reviewed.        Results for orders placed or performed in visit on 01/05/22   POCT COVID-19 Rapid Screening   Result Value Ref Range    POC Rapid COVID Positive (A) Negative     Acceptable Yes      Assessment:       1. COVID-19 virus infection    2. Cough          Plan:         COVID-19 virus infection    Cough  -     POCT COVID-19 Rapid Screening      Patient Instructions   Use an antihistamine such as Claritin, Zyrtec or Allegra to dry you out.     Use pseudoephedrine (behind the counter) to decongest. Pseudoephedrine  30 mg up to 240 mg /day. It  can raise your blood pressure and give you palpitations.    Use Flonase 1-2 sprays/nostril per day. It is a local acting steroid nasal spray, if you develop a bloody nose, stop using the medication immediately.    Use warm salt water gargles to ease your throat pain.  Hot tea helps soothe.     Use over-the-counter cough syrup such as Robitussin or Delsym to help with cough suppression    Over the counter you can use Tylenol (acetominophen) or Ibuprofen for your minor aches and pains as long as you have no contraindications.    Good nutrition. Lots of rest. Plenty of fluids    Must quarantine for 5 days from the start of symptoms    You must understand that you've received an Urgent Care treatment only and that you may be released before all your medical problems are known or treated. You, the patient, will arrange for follow up care as instructed.  Follow up with your PCP or specialty clinic as directed in the next 1-2 weeks if not improved or as needed.  You can call [phone] to schedule an appointment with the appropriate provider.  If your condition worsens we recommend that you receive another evaluation at the emergency room immediately or contact your primary medical clinics after hours call service to discuss your concerns.  Please return here or go to the Emergency Department for any concerns or worsening of condition.    Your test was POSITIVE for COVID-19 (coronavirus).       Please isolate yourself at home.  You may leave home and/or return to work once the following conditions are met:    If you were not hospitalized and are not moderately to severely immunocompromised:    More than 5 days since symptoms first appeared AND   More than 24 hours fever free without medications AND   Symptoms are improving   Continue to wear a mask around others for 5 additional days.    If you were hospitalized OR are moderately to severely immunocompromised:   More than 20 days since symptoms first  appeared   More than 24 hours fever free without medications   Symptoms have improved    If you had no symptoms but tested positive:   More than 5 days since the date of the first positive test (20 days if moderately to severely immunocompromised). If you develop symptoms, then use the guidelines above.   Continue to wear a mask around others for 5 additional days.      Contact Tracing    As one of the next steps, you will receive a call or text from the [place] Department of Health ([facility]) COVID-19 Tracing Team. See the contact information below so you know not to ignore the health departments call. It is important that you contact them back immediately so they can help.      Contact Tracer Number:  [phone]  Caller ID for most carriers: [place] Dept Health     What is contact tracing?  · Contact tracing is a process that helps identify everyone who has been in close contact with an infected person. Contact tracers let those people know they may have been exposed and guide them on next steps. Confidentiality is important for everyone; no one will be told who may have exposed them to the virus.  · Your involvement is important. The more we know about where and how this virus is spreading, the better chance we have at stopping it from spreading further.  What does exposure mean?  · Exposure means you have been within 6 feet for more than 15 minutes with a person who has or had COVID-19.  What kind of questions do the contact tracers ask?  · A contact tracer will confirm your basic contact information including name, address, phone number, and next of kin, as well as asking about any symptoms you may have had. Theyll also ask you how you think you may have gotten sick, such as places where you may have been exposed to the virus, and people you were with. Those names will never be shared with anyone outside of that call, and will only be used to help trace and stop the spread of the virus.   I have privacy  concerns. How will the state use my information?  · Your privacy about your health is important. All calls are completed using call centers that use the appropriate health privacy protection measures (HIPAA compliance), meaning that your patient information is safe. No one will ever ask you any questions related to immigration status. Your health comes first.   Do I have to participate?  · You do not have to participate, but we strongly encourage you to. Contact tracing can help us catch and control new outbreaks as theyre developing to keep your friends and family safe.   What if I dont hear from anyone?  · If you dont receive a call within 24 hours, you can call the number above right away to inquire about your status. That line is open from 8:00 am - 8:00 p.m., 7 days a week.  Contact tracing saves lives! Together, we have the power to beat this virus and keep our loved ones and neighbors safe.    For more information see CDC link below.      https://www.cdc.gov/coronavirus/2019-ncov/hcp/guidance-prevent-spread.html#precautions        Sources:  [facility], [place] [facility] and [facility]           Sincerely,     Dorita Roth NP

## 2022-02-14 ENCOUNTER — PATIENT MESSAGE (OUTPATIENT)
Dept: RESEARCH | Facility: HOSPITAL | Age: 22
End: 2022-02-14
Payer: COMMERCIAL

## 2022-03-11 ENCOUNTER — OFFICE VISIT (OUTPATIENT)
Dept: INTERNAL MEDICINE | Facility: CLINIC | Age: 22
End: 2022-03-11
Payer: COMMERCIAL

## 2022-03-11 VITALS
TEMPERATURE: 98 F | BODY MASS INDEX: 27.7 KG/M2 | OXYGEN SATURATION: 99 % | SYSTOLIC BLOOD PRESSURE: 118 MMHG | HEART RATE: 71 BPM | WEIGHT: 166.44 LBS | DIASTOLIC BLOOD PRESSURE: 72 MMHG

## 2022-03-11 DIAGNOSIS — J32.9 SINUSITIS, UNSPECIFIED CHRONICITY, UNSPECIFIED LOCATION: ICD-10-CM

## 2022-03-11 DIAGNOSIS — N76.0 ACUTE VAGINITIS: Primary | ICD-10-CM

## 2022-03-11 DIAGNOSIS — R11.0 NAUSEA: ICD-10-CM

## 2022-03-11 PROBLEM — R10.84 ABDOMINAL PAIN, GENERALIZED: Status: RESOLVED | Noted: 2019-01-03 | Resolved: 2022-03-11

## 2022-03-11 PROBLEM — R10.2 PELVIC PAIN IN FEMALE: Status: RESOLVED | Noted: 2020-11-05 | Resolved: 2022-03-11

## 2022-03-11 PROCEDURE — 99999 PR PBB SHADOW E&M-EST. PATIENT-LVL III: ICD-10-PCS | Mod: PBBFAC,,, | Performed by: INTERNAL MEDICINE

## 2022-03-11 PROCEDURE — 3008F PR BODY MASS INDEX (BMI) DOCUMENTED: ICD-10-PCS | Mod: CPTII,S$GLB,, | Performed by: INTERNAL MEDICINE

## 2022-03-11 PROCEDURE — 99214 PR OFFICE/OUTPT VISIT, EST, LEVL IV, 30-39 MIN: ICD-10-PCS | Mod: 25,S$GLB,, | Performed by: INTERNAL MEDICINE

## 2022-03-11 PROCEDURE — 1159F PR MEDICATION LIST DOCUMENTED IN MEDICAL RECORD: ICD-10-PCS | Mod: CPTII,S$GLB,, | Performed by: INTERNAL MEDICINE

## 2022-03-11 PROCEDURE — 3074F SYST BP LT 130 MM HG: CPT | Mod: CPTII,S$GLB,, | Performed by: INTERNAL MEDICINE

## 2022-03-11 PROCEDURE — 99214 OFFICE O/P EST MOD 30 MIN: CPT | Mod: 25,S$GLB,, | Performed by: INTERNAL MEDICINE

## 2022-03-11 PROCEDURE — 1159F MED LIST DOCD IN RCRD: CPT | Mod: CPTII,S$GLB,, | Performed by: INTERNAL MEDICINE

## 2022-03-11 PROCEDURE — 99999 PR PBB SHADOW E&M-EST. PATIENT-LVL III: CPT | Mod: PBBFAC,,, | Performed by: INTERNAL MEDICINE

## 2022-03-11 PROCEDURE — 3078F PR MOST RECENT DIASTOLIC BLOOD PRESSURE < 80 MM HG: ICD-10-PCS | Mod: CPTII,S$GLB,, | Performed by: INTERNAL MEDICINE

## 2022-03-11 PROCEDURE — 3078F DIAST BP <80 MM HG: CPT | Mod: CPTII,S$GLB,, | Performed by: INTERNAL MEDICINE

## 2022-03-11 PROCEDURE — 96372 PR INJECTION,THERAP/PROPH/DIAG2ST, IM OR SUBCUT: ICD-10-PCS | Mod: S$GLB,,, | Performed by: INTERNAL MEDICINE

## 2022-03-11 PROCEDURE — 96372 THER/PROPH/DIAG INJ SC/IM: CPT | Mod: S$GLB,,, | Performed by: INTERNAL MEDICINE

## 2022-03-11 PROCEDURE — 3074F PR MOST RECENT SYSTOLIC BLOOD PRESSURE < 130 MM HG: ICD-10-PCS | Mod: CPTII,S$GLB,, | Performed by: INTERNAL MEDICINE

## 2022-03-11 PROCEDURE — 3008F BODY MASS INDEX DOCD: CPT | Mod: CPTII,S$GLB,, | Performed by: INTERNAL MEDICINE

## 2022-03-11 RX ORDER — TRIAMCINOLONE ACETONIDE 40 MG/ML
40 INJECTION, SUSPENSION INTRA-ARTICULAR; INTRAMUSCULAR ONCE
Status: COMPLETED | OUTPATIENT
Start: 2022-03-11 | End: 2022-03-11

## 2022-03-11 RX ORDER — FLUCONAZOLE 150 MG/1
TABLET ORAL
Qty: 2 TABLET | Refills: 1 | Status: SHIPPED | OUTPATIENT
Start: 2022-03-11 | End: 2022-07-06

## 2022-03-11 RX ORDER — GUANFACINE 1 MG/1
1 TABLET ORAL NIGHTLY PRN
COMMUNITY
Start: 2022-01-05

## 2022-03-11 RX ORDER — AMOXICILLIN AND CLAVULANATE POTASSIUM 875; 125 MG/1; MG/1
1 TABLET, FILM COATED ORAL EVERY 12 HOURS
Qty: 20 TABLET | Refills: 0 | Status: SHIPPED | OUTPATIENT
Start: 2022-03-11 | End: 2022-07-06

## 2022-03-11 RX ORDER — ONDANSETRON 4 MG/1
4 TABLET, ORALLY DISINTEGRATING ORAL EVERY 8 HOURS PRN
Qty: 30 TABLET | Refills: 0 | Status: SHIPPED | OUTPATIENT
Start: 2022-03-11 | End: 2024-01-03

## 2022-03-11 RX ADMIN — TRIAMCINOLONE ACETONIDE 40 MG: 40 INJECTION, SUSPENSION INTRA-ARTICULAR; INTRAMUSCULAR at 10:03

## 2022-03-11 NOTE — PROGRESS NOTES
[facility] Note    Chief Complaint      Chief Complaint   Patient presents with    Nasal Congestion    Otalgia     Pressure in both ears, mostly left.     Vomiting    Vaginitis     History of Present Illness      Elvia Carr is a 21 y.o. female who presents today for nasal congestion, ear pain.  Patient comes to appointment alone.    Started with body aches, nasal congestion, slight fever on 3/1/2022.  Temp up to 99.5.  Took Tylenol which helped some.  Congestion has not improved, now having headaches/nausea.  Did vomit last night, looked like food she eaten.  No abd pain.  No hematemesis.  No diarrhea, last BM was normal.  No sinus pressure. Coughing on occasion.  Took COVID test x2 that was negative.  Started with vaginal itching yesterday, has had yeast infections in past.    Problem List Items Addressed This Visit    None     Visit Diagnoses     Acute vaginitis    -  Primary    Relevant Medications    fluconazole (DIFLUCAN) 150 MG Tab    Sinusitis, unspecified chronicity, unspecified location        Relevant Medications    amoxicillin-clavulanate 875-125mg (AUGMENTIN) 875-125 mg per tablet    triamcinolone acetonide injection 40 mg (Start on 3/11/2022 10:00 AM)    Nausea        Relevant Medications    ondansetron (ZOFRAN-ODT) 4 MG TbDL          Health Maintenance   Topic Date Due    TETANUS VACCINE  07/31/2019    Chlamydia Screening  05/26/2022    Pap Smear  05/26/2024    Hepatitis C Screening  Completed    Lipid Panel  Completed    HPV Vaccines  Completed       Past Medical History:   Diagnosis Date    Asthma     UC (ulcerative colitis) 04/2017       Past Surgical History:   Procedure Laterality Date    COLONOSCOPY N/A 04/17/2017    Procedure: COLONOSCOPY;  Surgeon: Drake Arnold MD;  Location: [facility];  Service: Endoscopy;  Laterality: N/A;    COLONOSCOPY N/A 08/23/2019    Procedure: COLONOSCOPY;  Surgeon: Taylor Cook MD;  [facility]: [facility]  ENDO;  Service: Endoscopy;  Laterality: N/A;    INSERTION OF CONTRACEPTIVE CAPSULE  05/2016    NONE         family history includes Alcohol abuse in her maternal grandmother; Arthritis in her mother; Depression in her mother; Heart failure in her maternal grandfather; Learning disabilities in her mother; Migraines in her mother; Miscarriages / Stillbirths in her mother; Osteoarthritis in her mother.    Social History     Tobacco Use    Smoking status: Current Some Day Smoker     Packs/day: 0.00     Years: 1.00     Pack years: 0.00     Types: Cigarettes    Smokeless tobacco: Never Used   Substance Use Topics    Alcohol use: Yes     Alcohol/week: 1.0 - 2.0 standard drink     Types: 1 - 2 Shots of liquor per week    Drug use: No       Review of Systems   Constitutional: Negative for chills and fever.   HENT: Negative for sore throat.    Respiratory: Negative for cough and shortness of breath.    Cardiovascular: Negative for chest pain and palpitations.   Gastrointestinal: Negative for constipation, diarrhea, nausea and vomiting.   Genitourinary: Negative for dysuria and hematuria.   Musculoskeletal: Negative for falls.   Neurological: Negative for headaches.        Outpatient Encounter Medications as of 3/11/2022   Medication Sig Dispense Refill    dextroamphetamine-amphetamine (ADDERALL) 20 mg tablet Take 1 tablet by mouth 2 (two) times daily. 60 tablet 0    drospirenone-ethinyl estradioL (ANNABELLE) 3-0.03 mg per tablet Take 1 tablet by mouth once daily. 84 tablet 4    DULoxetine (CYMBALTA) 60 MG capsule Take 60 mg by mouth once daily.      guanFACINE (TENEX) 1 MG Tab Take 1 mg by mouth nightly as needed.      amoxicillin-clavulanate 875-125mg (AUGMENTIN) 875-125 mg per tablet Take 1 tablet by mouth every 12 (twelve) hours. 20 tablet 0    fluconazole (DIFLUCAN) 150 MG Tab Take one tablet by mouth, can take 2nd tablet on day 3 if still having symptoms 2 tablet 1    ondansetron (ZOFRAN-ODT) 4 MG TbDL Take 1  tablet (4 mg total) by mouth every 8 (eight) hours as needed (nausea). 30 tablet 0    [DISCONTINUED] multivitamin capsule Take 1 capsule by mouth once daily.      [DISCONTINUED] pimecrolimus (ELIDEL) 1 % cream APPLY EXTERNALLY TO THE AFFECTED AREA TWICE DAILY 100 g 0    [DISCONTINUED] traZODone (DESYREL) 50 MG tablet TK 1/2 TO 1 T PO QHS PRF SLP       Facility-Administered Encounter Medications as of 3/11/2022   Medication Dose Route Frequency Provider Last Rate Last Admin    triamcinolone acetonide injection 40 mg  40 mg Intramuscular Once Arlene Montelongo MD            Review of patient's allergies indicates:   Allergen Reactions    Thimerosal Blisters, Dermatitis, Itching, Rash and Swelling       Physical Exam      Vital Signs  Temp: 98 °F (36.7 °C)  Pulse: 71  SpO2: 99 %  BP: 118/72  Pain Score: 0-No pain  Height and Weight  Weight: 75.5 kg (166 lb 7.2 oz)]    Physical Exam  Constitutional:       Appearance: She is well-developed.   HENT:      Head: Normocephalic and atraumatic.      Right Ear: External ear normal.      Left Ear: External ear normal.   Eyes:      General:         Right eye: No discharge.         Left eye: No discharge.   Cardiovascular:      Rate and Rhythm: Normal rate and regular rhythm.      Heart sounds: Normal heart sounds. No murmur heard.  Pulmonary:      Effort: Pulmonary effort is normal. No respiratory distress.      Breath sounds: Normal breath sounds.   Abdominal:      General: There is no distension.      Palpations: Abdomen is soft.      Tenderness: There is no abdominal tenderness. There is no guarding.   Musculoskeletal:         General: Normal range of motion.      Cervical back: Normal range of motion.   Skin:     General: Skin is warm and dry.   Neurological:      Mental Status: She is alert and oriented to person, place, and time.   Psychiatric:         Behavior: Behavior normal.        Laboratory:  CBC:  No results for input(s): WBC, RBC, HGB, HCT, PLT, MCV, MCH, MCHC  in the last 2160 hours.  CMP:  No results for input(s): GLU, CALCIUM, ALBUMIN, PROT, NA, K, CO2, CL, BUN, ALKPHOS, ALT, AST, BILITOT in the last 2160 hours.    Invalid input(s): CREATININ  URINALYSIS:  No results for input(s): COLORU, CLARITYU, SPECGRAV, PHUR, PROTEINUA, GLUCOSEU, BILIRUBINCON, BLOODU, WBCU, RBCU, BACTERIA, MUCUS, NITRITE, LEUKOCYTESUR, UROBILINOGEN, HYALINECASTS in the last 2160 hours.   LIPIDS:  No results for input(s): TSH, HDL, CHOL, TRIG, LDLCALC, CHOLHDL, NONHDLCHOL, TOTALCHOLEST in the last 2160 hours.  TSH:  No results for input(s): TSH in the last 2160 hours.  A1C:  No results for input(s): HGBA1C in the last 2160 hours.    Radiology:  No results found in the last 30 days.     Assessment/Plan     Elvia Carr is a 21 y.o.female with:    1. Acute vaginitis  - fluconazole (DIFLUCAN) 150 MG Tab; Take one tablet by mouth, can take 2nd tablet on day 3 if still having symptoms  Dispense: 2 tablet; Refill: 1    2. Sinusitis, unspecified chronicity, unspecified location  - amoxicillin-clavulanate 875-125mg (AUGMENTIN) 875-125 mg per tablet; Take 1 tablet by mouth every 12 (twelve) hours.  Dispense: 20 tablet; Refill: 0  - triamcinolone acetonide injection 40 mg    3. Nausea  - ondansetron (ZOFRAN-ODT) 4 MG TbDL; Take 1 tablet (4 mg total) by mouth every 8 (eight) hours as needed (nausea).  Dispense: 30 tablet; Refill: 0    -counseled on drinking lots of water  -zofran for nausea  -anthistamine, flonase, mucinex OTC for symptoms  -Kenalog today  -Diflucan for vagininits  -Continue current medications and maintain follow up with specialists.    -Follow up if symptoms worsen or fail to improve.       Arlene Montelongo MD  [facility]

## 2022-03-11 NOTE — PATIENT INSTRUCTIONS
Try the following over the counter medications to help with your symptoms:  1. Antihistamine once daily (either Zyrtec, Allegra, Claritin or Xyzal)  2. Flonase nasal spray once daily (fluticasone is generic)    3. Mucinex twice daily (Guaifenesin is generic).  Not DM, D, PE.

## 2022-05-31 ENCOUNTER — LAB VISIT (OUTPATIENT)
Dept: LAB | Facility: HOSPITAL | Age: 22
End: 2022-05-31
Attending: INTERNAL MEDICINE
Payer: COMMERCIAL

## 2022-05-31 ENCOUNTER — OFFICE VISIT (OUTPATIENT)
Dept: GASTROENTEROLOGY | Facility: CLINIC | Age: 22
End: 2022-05-31
Payer: COMMERCIAL

## 2022-05-31 DIAGNOSIS — K51.313: ICD-10-CM

## 2022-05-31 DIAGNOSIS — K51.313: Primary | ICD-10-CM

## 2022-05-31 LAB
25(OH)D3+25(OH)D2 SERPL-MCNC: 38 NG/ML (ref 30–96)
BASOPHILS # BLD AUTO: 0.04 K/UL (ref 0–0.2)
BASOPHILS NFR BLD: 0.4 % (ref 0–1.9)
CRP SERPL-MCNC: 1.4 MG/L (ref 0–8.2)
DIFFERENTIAL METHOD: NORMAL
EOSINOPHIL # BLD AUTO: 0.1 K/UL (ref 0–0.5)
EOSINOPHIL NFR BLD: 1.3 % (ref 0–8)
ERYTHROCYTE [DISTWIDTH] IN BLOOD BY AUTOMATED COUNT: 12.1 % (ref 11.5–14.5)
HCT VFR BLD AUTO: 41.5 % (ref 37–48.5)
HGB BLD-MCNC: 13.3 G/DL (ref 12–16)
IGA SERPL-MCNC: 182 MG/DL (ref 40–350)
IMM GRANULOCYTES # BLD AUTO: 0.02 K/UL (ref 0–0.04)
IMM GRANULOCYTES NFR BLD AUTO: 0.2 % (ref 0–0.5)
LYMPHOCYTES # BLD AUTO: 3.1 K/UL (ref 1–4.8)
LYMPHOCYTES NFR BLD: 33.5 % (ref 18–48)
MCH RBC QN AUTO: 29 PG (ref 27–31)
MCHC RBC AUTO-ENTMCNC: 32 G/DL (ref 32–36)
MCV RBC AUTO: 91 FL (ref 82–98)
MONOCYTES # BLD AUTO: 0.5 K/UL (ref 0.3–1)
MONOCYTES NFR BLD: 5 % (ref 4–15)
NEUTROPHILS # BLD AUTO: 5.6 K/UL (ref 1.8–7.7)
NEUTROPHILS NFR BLD: 59.6 % (ref 38–73)
NRBC BLD-RTO: 0 /100 WBC
PLATELET # BLD AUTO: 295 K/UL (ref 150–450)
PMV BLD AUTO: 10.4 FL (ref 9.2–12.9)
RBC # BLD AUTO: 4.58 M/UL (ref 4–5.4)
WBC # BLD AUTO: 9.35 K/UL (ref 3.9–12.7)

## 2022-05-31 PROCEDURE — 86140 C-REACTIVE PROTEIN: CPT | Performed by: INTERNAL MEDICINE

## 2022-05-31 PROCEDURE — 82784 ASSAY IGA/IGD/IGG/IGM EACH: CPT | Performed by: INTERNAL MEDICINE

## 2022-05-31 PROCEDURE — 99204 PR OFFICE/OUTPT VISIT, NEW, LEVL IV, 45-59 MIN: ICD-10-PCS | Mod: 95,,, | Performed by: INTERNAL MEDICINE

## 2022-05-31 PROCEDURE — 83516 IMMUNOASSAY NONANTIBODY: CPT | Performed by: INTERNAL MEDICINE

## 2022-05-31 PROCEDURE — 82306 VITAMIN D 25 HYDROXY: CPT | Performed by: INTERNAL MEDICINE

## 2022-05-31 PROCEDURE — 85025 COMPLETE CBC W/AUTO DIFF WBC: CPT | Performed by: INTERNAL MEDICINE

## 2022-05-31 PROCEDURE — 99204 OFFICE O/P NEW MOD 45 MIN: CPT | Mod: 95,,, | Performed by: INTERNAL MEDICINE

## 2022-05-31 PROCEDURE — 36415 COLL VENOUS BLD VENIPUNCTURE: CPT | Mod: PO | Performed by: INTERNAL MEDICINE

## 2022-05-31 RX ORDER — PANTOPRAZOLE SODIUM 40 MG/1
40 TABLET, DELAYED RELEASE ORAL DAILY
Qty: 30 TABLET | Refills: 11 | Status: SHIPPED | OUTPATIENT
Start: 2022-05-31 | End: 2022-11-10

## 2022-05-31 NOTE — PROGRESS NOTES
[facility]   Clinic Note              Patient Name: Elvia Carr  Age: 21 y.o.  Sex: female  MRN: [id number]    TODAY'S DATE:  5/31/2022  REFERRING PROVIDER:  No ref. provider found     HPI:  Elvia Carr is a 21 y.o. female with a prior diagnosis of ulcerative colitis who was seen to establish care.    Today patient notes begin diagnosed with UC at age 15 by Dr. Garrett, at which time she was having abdominal pain and rectal bleeding.  She was started on apriso and rowasa, which she used for  2-3 weeks with improvement in symptoms.  She continued on apriso for the next two years, and after repeat colonoscopy in 2019 medications were stopped.    Today patient notes subsequent change in diagnosis to IBS after followup colonsocopy in 2019.  At present her biggest symptom is persistent nausea, with occasional emesis (only 3 times total).  She has tried tums, with minimal help.  Zofran helps some too.  Never been on a PPI.  Her diet is inconsistent, eating noodles and potatoes. No fast food/fried foods.  Sometimes adds noelle's to grits.  No sensation of heartburn.  No known family history of IBD/colon cancer.    Regarding bowel habits, she borders on constipation, but does tend to have one formed BM/day.  Occasionally has urgency as well.   No blood in stool.  No tobacco, social alcohol.      Last Colonoscopy:    08/23/19:  Impression:           - The examined portion of the ileum was normal.                         - The entire examined colon is normal. Biopsied to                         evaluate for UC. Endoscopically this is not                         consistent with UC.   - Path: 3. Rectum, biopsy:  - Focal active proctitis.  - Negative for dysplasia.       Other Endoscopies:    04/17/17:  Impression:           - The examined portion of the ileum was normal.                         Biopsied.                         - The sigmoid colon and cecum are normal. Biopsied.                          - Congested, erythematous, eroded and                         vascular-pattern-decreased mucosa from 10 to 20 cm                         proximal to the anus. Biopsied.                         - Congested, erythematous, eroded, friable (with                         contact bleeding) and vascular-pattern-decreased                         mucosa in the mid rectum and in the distal rectum.                         Biopsied.                         - The examination was otherwise normal on direct                         and retroflexion views.                         - The examination was suspicious for ulcerative                         colitis with proctitis ulcerative colitis. Biopsied.     Path: 3. Sigmoid biopsy:  -Focally denuded colonic mucosa with stromal edema and decreased stromal lymphocytes  -Normal crypt architecture  -Negative for dysplasia or malignancy  -Correlate clinically  4. 20 cm to 10 cm random biopsy:  -Acute colitis with cryptitis and focal ulceration  -Negative for dysplasia or malignancy  -See note  5. Distal rectum biopsy:  -Acute ulcerative proctitis  -Negative for dysplasia or malignancy  -See note  Note: Acute inflammatory changes are present. Differential includes inflammatory bowel disease, infection,  medication/NSAID, etc. Correlate clinically.      ROS: Negative other than above      Review of patient's allergies indicates:   Allergen Reactions    Thimerosal Blisters, Dermatitis, Itching, Rash and Swelling       No outpatient medications have been marked as taking for the 5/31/22 encounter (Appointment) with Willie Roque MD.       Past Medical History:   Diagnosis Date    Asthma     UC (ulcerative colitis) 04/2017       Past Surgical History:   Procedure Laterality Date    COLONOSCOPY N/A 04/17/2017    Procedure: COLONOSCOPY;  Surgeon: Drake Arnold MD;  [facility];  Service: Endoscopy;  Laterality: N/A;    COLONOSCOPY N/A 08/23/2019     Procedure: COLONOSCOPY;  Surgeon: Taylor Cook MD;  Location: [facility];  Service: Endoscopy;  Laterality: N/A;    INSERTION OF CONTRACEPTIVE CAPSULE  05/2016    NONE         Family History   Problem Relation Age of Onset    Osteoarthritis Mother     Migraines Mother     Arthritis Mother     Depression Mother     Learning disabilities Mother     Miscarriages / Stillbirths Mother     Heart failure Maternal Grandfather     Alcohol abuse Maternal Grandmother     Breast cancer Neg Hx     Colon cancer Neg Hx     Ovarian cancer Neg Hx        Social History     Socioeconomic History    Marital status: Single    Number of children: 0   Occupational History    Occupation: student   Tobacco Use    Smoking status: Current Some Day Smoker     Packs/day: 0.00     Years: 1.00     Pack years: 0.00     Types: Cigarettes    Smokeless tobacco: Never Used   Substance and Sexual Activity    Alcohol use: Yes     Alcohol/week: 1.0 - 2.0 standard drink     Types: 1 - 2 Shots of liquor per week    Drug use: No    Sexual activity: Yes     Partners: Male     Birth control/protection: Other-see comments     Comment: Jenifer   Social History Narrative    Exercises regularly (3-5 days weekly): resistance/cardio.      Diet is fair/good.      Satisfied with weight.    She does drink at least 1/2 gallon water daily.    She drinks 0 coffee/tea/caffeine-containing soft drinks daily.    Total sleep time at night is 7 hours.    She does wear seat belts.    Hobbies include none.         Vital Signs:  There were no vitals taken for this visit.     General: Awoke and orientedx3, in no acute distress  HEENT: Normocephalic, atruamatic, Moist mucous membranes  CV: Regular rate and rhythm, no JVD  Pulm: Normal inspiratory effort, no audible wheezing  Abdomen: nondistended abdomen  Extremities: No clubbing, cyanosis or edema  Psych: Normal affect. Good eye contact     Labs:   Lab Results   Component Value Date    CRP 1.1  01/03/2019    CALPROTECTIN <15.6 01/04/2019     Lab Results   Component Value Date    HEPBSAG Negative 05/26/2021    HEPBCAB Negative 01/03/2019     Lab Results   Component Value Date    TBGOLDPLUS Negative 01/03/2019     Lab Results   Component Value Date    PONWZOCB92 561 03/20/2017     Lab Results   Component Value Date    WBC 8.68 01/03/2019    HGB 13.2 01/03/2019    HCT 39.9 01/03/2019    MCV 88 01/03/2019     01/03/2019     Lab Results   Component Value Date    CREATININE 0.7 01/03/2019    ALBUMIN 4.1 01/03/2019    BILITOT 0.4 01/03/2019    ALKPHOS 41 (L) 01/03/2019    AST 20 01/03/2019    ALT 22 01/03/2019       Assessment/Plan:  Elvia Carr is a 21 y.o. female with a prior diagnosis of ulcerative colitis who was seen to establish care.    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  We discussed that possibility that her UC was caught early, treated with rowasa, and in remission since that time.  I would like to evaluate for ongoing inflammation noninvasively with fecal fadia and CRP.  In the meantime I will empirically treat dyspepsia with PPI    -- Labs today: CRP, fecal fadia, Vit D, celiac serologies (IgA never checked in the past)  -- Begin PPI  -- Further recs pending response/review of labs    RTC 3 mo    Thank you for involving us in the care of this patient.        Willie Roque MD  Department of Gastroenterology & Hepatology    The patient location is: [place]  The chief complaint leading to consultation is:     Visit type: audiovisual    Face to Face time with patient: 20 minutes  45 minutes of total time spent on the encounter, which includes face to face time and non-face to face time preparing to see the patient (eg, review of tests), Obtaining and/or reviewing separately obtained history, Documenting clinical information in the  electronic or other health record, Independently interpreting results (not separately reported) and communicating results to the patient/family/caregiver, or Care coordination (not separately reported).     Each patient to whom he or she provides medical services by telemedicine is:  (1) informed of the relationship between the physician and patient and the respective role of any other health care provider with respect to management of the patient; and (2) notified that he or she may decline to receive medical services by telemedicine and may withdraw from such care at any time.

## 2022-06-06 LAB — TTG IGA SER-ACNC: 4 UNITS

## 2022-07-06 ENCOUNTER — PATIENT MESSAGE (OUTPATIENT)
Dept: GASTROENTEROLOGY | Facility: CLINIC | Age: 22
End: 2022-07-06
Payer: COMMERCIAL

## 2022-07-06 ENCOUNTER — OFFICE VISIT (OUTPATIENT)
Dept: OBSTETRICS AND GYNECOLOGY | Facility: CLINIC | Age: 22
End: 2022-07-06
Payer: COMMERCIAL

## 2022-07-06 ENCOUNTER — TELEPHONE (OUTPATIENT)
Dept: OBSTETRICS AND GYNECOLOGY | Facility: CLINIC | Age: 22
End: 2022-07-06

## 2022-07-06 VITALS
BODY MASS INDEX: 28.1 KG/M2 | DIASTOLIC BLOOD PRESSURE: 70 MMHG | WEIGHT: 168.63 LBS | SYSTOLIC BLOOD PRESSURE: 104 MMHG | HEIGHT: 65 IN

## 2022-07-06 DIAGNOSIS — B37.31 CANDIDAL VULVOVAGINITIS: ICD-10-CM

## 2022-07-06 DIAGNOSIS — N94.9 VAGINAL BURNING: ICD-10-CM

## 2022-07-06 DIAGNOSIS — N89.8 VAGINAL ITCHING: Primary | ICD-10-CM

## 2022-07-06 PROCEDURE — 87186 SC STD MICRODIL/AGAR DIL: CPT | Performed by: OBSTETRICS & GYNECOLOGY

## 2022-07-06 PROCEDURE — 3008F BODY MASS INDEX DOCD: CPT | Mod: CPTII,S$GLB,, | Performed by: OBSTETRICS & GYNECOLOGY

## 2022-07-06 PROCEDURE — 87102 FUNGUS ISOLATION CULTURE: CPT | Performed by: OBSTETRICS & GYNECOLOGY

## 2022-07-06 PROCEDURE — 1159F PR MEDICATION LIST DOCUMENTED IN MEDICAL RECORD: ICD-10-PCS | Mod: CPTII,S$GLB,, | Performed by: OBSTETRICS & GYNECOLOGY

## 2022-07-06 PROCEDURE — 3078F DIAST BP <80 MM HG: CPT | Mod: CPTII,S$GLB,, | Performed by: OBSTETRICS & GYNECOLOGY

## 2022-07-06 PROCEDURE — 87106 FUNGI IDENTIFICATION YEAST: CPT | Performed by: OBSTETRICS & GYNECOLOGY

## 2022-07-06 PROCEDURE — 99999 PR PBB SHADOW E&M-EST. PATIENT-LVL III: ICD-10-PCS | Mod: PBBFAC,,, | Performed by: OBSTETRICS & GYNECOLOGY

## 2022-07-06 PROCEDURE — 1160F RVW MEDS BY RX/DR IN RCRD: CPT | Mod: CPTII,S$GLB,, | Performed by: OBSTETRICS & GYNECOLOGY

## 2022-07-06 PROCEDURE — 87210 SMEAR WET MOUNT SALINE/INK: CPT | Mod: QW,S$GLB,, | Performed by: OBSTETRICS & GYNECOLOGY

## 2022-07-06 PROCEDURE — 1159F MED LIST DOCD IN RCRD: CPT | Mod: CPTII,S$GLB,, | Performed by: OBSTETRICS & GYNECOLOGY

## 2022-07-06 PROCEDURE — 87210 PR  SMEAR,STAIN,WET MNT,INTERP: ICD-10-PCS | Mod: QW,S$GLB,, | Performed by: OBSTETRICS & GYNECOLOGY

## 2022-07-06 PROCEDURE — 99214 PR OFFICE/OUTPT VISIT, EST, LEVL IV, 30-39 MIN: ICD-10-PCS | Mod: S$GLB,,, | Performed by: OBSTETRICS & GYNECOLOGY

## 2022-07-06 PROCEDURE — 3074F SYST BP LT 130 MM HG: CPT | Mod: CPTII,S$GLB,, | Performed by: OBSTETRICS & GYNECOLOGY

## 2022-07-06 PROCEDURE — 3074F PR MOST RECENT SYSTOLIC BLOOD PRESSURE < 130 MM HG: ICD-10-PCS | Mod: CPTII,S$GLB,, | Performed by: OBSTETRICS & GYNECOLOGY

## 2022-07-06 PROCEDURE — 99999 PR PBB SHADOW E&M-EST. PATIENT-LVL III: CPT | Mod: PBBFAC,,, | Performed by: OBSTETRICS & GYNECOLOGY

## 2022-07-06 PROCEDURE — 3078F PR MOST RECENT DIASTOLIC BLOOD PRESSURE < 80 MM HG: ICD-10-PCS | Mod: CPTII,S$GLB,, | Performed by: OBSTETRICS & GYNECOLOGY

## 2022-07-06 PROCEDURE — 99214 OFFICE O/P EST MOD 30 MIN: CPT | Mod: S$GLB,,, | Performed by: OBSTETRICS & GYNECOLOGY

## 2022-07-06 PROCEDURE — 3008F PR BODY MASS INDEX (BMI) DOCUMENTED: ICD-10-PCS | Mod: CPTII,S$GLB,, | Performed by: OBSTETRICS & GYNECOLOGY

## 2022-07-06 PROCEDURE — 1160F PR REVIEW ALL MEDS BY PRESCRIBER/CLIN PHARMACIST DOCUMENTED: ICD-10-PCS | Mod: CPTII,S$GLB,, | Performed by: OBSTETRICS & GYNECOLOGY

## 2022-07-06 RX ORDER — DULOXETIN HYDROCHLORIDE 30 MG/1
30 CAPSULE, DELAYED RELEASE ORAL DAILY
COMMUNITY
Start: 2022-05-24

## 2022-07-06 RX ORDER — TRIAMCINOLONE ACETONIDE 5 MG/G
CREAM TOPICAL
Qty: 454 G | Refills: 1 | Status: SHIPPED | OUTPATIENT
Start: 2022-07-06 | End: 2022-11-10

## 2022-07-06 RX ORDER — FLUCONAZOLE 200 MG/1
200 TABLET ORAL DAILY
Qty: 5 TABLET | Refills: 0 | Status: SHIPPED | OUTPATIENT
Start: 2022-07-06 | End: 2022-07-11

## 2022-07-06 NOTE — PROGRESS NOTES
Subjective:     Patient ID: Elvia Carr is a 21 y.o. female.     Chief Complaint: Vaginal Discharge and Vaginal Itching     History of Present Illness: This patient is a 21 y.o. female, who presents to the [facility] for evaluation of vaginal discharge vulvovaginal discomfort and itching.  She is using Deanna birth control pills.  And denies menstrual abnormalities    Patient's last menstrual period was 06/08/2022.    Review of Systems    GENERAL: No fever, chills, fatigability or weightchange  SKIN: No rashes, itching or changes in color or texture of skin.  HEAD: No headaches or recent head trauma.  EYES: Visual acuity fine. No photophobia,r diplopia.  EARS: Denies earache or vertigo  NOSE: No loss of smell, no epistaxis or postnasal drip.  MOUTH & THROAT: No hoarseness or change in voice.   NODES: Denies swollen glands.  CHEST: Denies WOLFF, cyanosis, wheezing, cough and sputum production.  CARDIOVASCULAR: Denies chest pain, PND, orthopnea or reduced exercise tolerance.  ABDOMEN: Appetite fine. No weight loss. bloating, Denies diarrhea, abdominal pain, hematemesis or blood in stool.  URINARY: No flank pain, dysuria or hematuria.  PERIPHERAL VASCULAR: No claudication or cyanosis.Varicosities  MUSCULOSKELETAL: No joint stiffness or swelling. Denies back pain.muscle aches  NEUROLOGIC: No history of seizures, paralysis, alteration of gait or coordination.       Objective:       Physical Exam     APPEARANCE: Well nourished, well developed, in no acute distress.    GENITOURINARY:  Vulva:  Scaling of the skin inflammation and irritation noted. Normal female genital architecture.   Urethral Meatus: Normal size and location, no lesions, no prolapse.  Urethra: No masses, tenderness, prolapse or scarring.  Vagina:  Moist with rugae, creamy, white discharge noted, no significant cystocele or rectocele.  Cervix: No lesions, normal diameter, no stenosis, no cervical motion tenderness. .  Uterus: 6 week size,  regular shape, mobile, non-tender, normal position, good support.  Adnexa: No masses, tenderness or CDS nodularity.  Anus Perineum: No lesions, no relaxation, no external hemorrhoids.  Abdomen: No masses, tenderness, hernia or ascites, no hepatasplenomegaly  Skin: No rashes, lesions, ulcers, acne, hirsutism.  Peripheral/lower extremities: No edema, erythema or tenderness.  Lymphatic: No axillary, neck or groin nodes palp.  Mental Status: Alert, oriented x 3, normal affect and mood.          @PROCEDURE:@  Wet Prep:  pH = 4.0  -WBCS = occasion  -Lactobacilli = numerous with leptothrix noted  -BV = Amsel negative clue cells negative whiff  -Candida = Hyphae numerous hyphae noted  -Trichomnas = none seen  -Cells- Basal and Parabasal, Superficial: Maturation:  Superficial cells predominate.  -Impression:  Candida vulvovaginitis  -Treatment:  Diflucan 200 mg strength tablets 1 daily for 5 straight days, Kenalog ointment to be applied twice a day for week once a day for week.  -RTC [facility] p.r.n. symptoms         Assessment:      1. Vaginal itching    2. Vaginal burning    3. History of Candidal vulvovaginitis               Plan:  1.  Kenalog ointment to be applied twice a day for week, once a day for week.  2.  Diflucan 200 mg strength tablets 1 daily for 5 days.  3.  Candida culture of the vagina taken today.  4.  Return to clinic p.r.n. symptoms                  No orders of the defined types were placed in this encounter.

## 2022-07-06 NOTE — TELEPHONE ENCOUNTER
----- Message from Teresa Lozano sent at 7/6/2022  3:27 PM CDT -----  Regarding: Medication  Contact: KEVIN DUARTE [[id number]]  Name of Who is Calling:KEVIN DUARTE [[id number]]          What is the request in detail:   Pt states she needs medication that was sent ovet today needs to be resent , she states the pharmacy doesn't have it . Please advise     [id number] - [place], [place] - [address] AT [facility] the clinic reply by [facility]: No           What Number to Call Back if not in [facility]:

## 2022-07-06 NOTE — TELEPHONE ENCOUNTER
Spoke with pt. [facility] pharmacy did not get meds.     Attempted to call pharmacy. Phone disconnects alf through while on hold. Pt will go to pharmacy and let me speak to pharmacy

## 2022-07-08 ENCOUNTER — PATIENT MESSAGE (OUTPATIENT)
Dept: OBSTETRICS AND GYNECOLOGY | Facility: CLINIC | Age: 22
End: 2022-07-08
Payer: COMMERCIAL

## 2022-07-14 ENCOUNTER — PATIENT MESSAGE (OUTPATIENT)
Dept: OBSTETRICS AND GYNECOLOGY | Facility: CLINIC | Age: 22
End: 2022-07-14
Payer: COMMERCIAL

## 2022-07-22 LAB — FUNGUS SPEC CULT: ABNORMAL

## 2022-07-26 ENCOUNTER — PATIENT MESSAGE (OUTPATIENT)
Dept: OBSTETRICS AND GYNECOLOGY | Facility: CLINIC | Age: 22
End: 2022-07-26
Payer: COMMERCIAL

## 2022-10-06 ENCOUNTER — PATIENT MESSAGE (OUTPATIENT)
Dept: RESEARCH | Facility: HOSPITAL | Age: 22
End: 2022-10-06
Payer: COMMERCIAL

## 2022-10-30 ENCOUNTER — PATIENT MESSAGE (OUTPATIENT)
Dept: GASTROENTEROLOGY | Facility: CLINIC | Age: 22
End: 2022-10-30
Payer: COMMERCIAL

## 2022-10-30 DIAGNOSIS — R10.9 ABDOMINAL PAIN, UNSPECIFIED ABDOMINAL LOCATION: Primary | ICD-10-CM

## 2022-10-31 ENCOUNTER — TELEPHONE (OUTPATIENT)
Dept: GASTROENTEROLOGY | Facility: CLINIC | Age: 22
End: 2022-10-31
Payer: COMMERCIAL

## 2022-10-31 RX ORDER — OMEPRAZOLE 40 MG/1
40 CAPSULE, DELAYED RELEASE ORAL
Qty: 60 CAPSULE | Refills: 11 | Status: SHIPPED | OUTPATIENT
Start: 2022-10-31 | End: 2023-10-31

## 2022-10-31 NOTE — TELEPHONE ENCOUNTER
Called patient back to discuss cyclical symptoms.  We will trial change of PPI from protonix to prilosec 40mg BID; she has clinic followup in 2 weeks at which time we will see if this change has helped and discuss further.  I have also ordered an endoscopy which we will work on scheduling ASAP.

## 2022-10-31 NOTE — TELEPHONE ENCOUNTER
Received a message from Dr. Roque wanted us to schedule patient for EDG. Patient was scheduled for a call on 12/08/2022 @ 10:30 am from the [facility] at [facility].

## 2022-11-01 ENCOUNTER — TELEPHONE (OUTPATIENT)
Dept: ENDOSCOPY | Facility: HOSPITAL | Age: 22
End: 2022-11-01
Payer: COMMERCIAL

## 2022-11-02 ENCOUNTER — TELEPHONE (OUTPATIENT)
Dept: ENDOSCOPY | Facility: HOSPITAL | Age: 22
End: 2022-11-02
Payer: COMMERCIAL

## 2022-11-02 VITALS — HEIGHT: 65 IN | BODY MASS INDEX: 28.32 KG/M2 | WEIGHT: 170 LBS

## 2022-11-02 DIAGNOSIS — R10.9 ABDOMINAL PAIN, UNSPECIFIED ABDOMINAL LOCATION: Primary | ICD-10-CM

## 2022-11-06 ENCOUNTER — ANESTHESIA EVENT (OUTPATIENT)
Dept: ENDOSCOPY | Facility: HOSPITAL | Age: 22
End: 2022-11-06
Payer: COMMERCIAL

## 2022-11-06 RX ORDER — SODIUM CHLORIDE 9 MG/ML
INJECTION, SOLUTION INTRAVENOUS CONTINUOUS
Status: CANCELLED | OUTPATIENT
Start: 2022-11-06

## 2022-11-06 RX ORDER — LIDOCAINE HYDROCHLORIDE 10 MG/ML
1 INJECTION, SOLUTION EPIDURAL; INFILTRATION; INTRACAUDAL; PERINEURAL ONCE
Status: CANCELLED | OUTPATIENT
Start: 2022-11-06 | End: 2022-11-06

## 2022-11-07 ENCOUNTER — HOSPITAL ENCOUNTER (OUTPATIENT)
Facility: HOSPITAL | Age: 22
Discharge: HOME | End: 2022-11-07
Attending: INTERNAL MEDICINE | Admitting: INTERNAL MEDICINE
Payer: COMMERCIAL

## 2022-11-07 ENCOUNTER — ANESTHESIA (OUTPATIENT)
Dept: ENDOSCOPY | Facility: HOSPITAL | Age: 22
End: 2022-11-07
Payer: COMMERCIAL

## 2022-11-07 VITALS
TEMPERATURE: 99 F | RESPIRATION RATE: 14 BRPM | DIASTOLIC BLOOD PRESSURE: 82 MMHG | OXYGEN SATURATION: 100 % | HEART RATE: 84 BPM | SYSTOLIC BLOOD PRESSURE: 124 MMHG

## 2022-11-07 DIAGNOSIS — R10.13 EPIGASTRIC PAIN: Primary | ICD-10-CM

## 2022-11-07 LAB
B-HCG UR QL: NEGATIVE
CTP QC/QA: YES

## 2022-11-07 PROCEDURE — 88305 TISSUE EXAM BY PATHOLOGIST: CPT | Mod: 26,,, | Performed by: PATHOLOGY

## 2022-11-07 PROCEDURE — 43239 PR EGD, FLEX, W/BIOPSY, SGL/MULTI: ICD-10-PCS | Mod: ,,, | Performed by: INTERNAL MEDICINE

## 2022-11-07 PROCEDURE — 81025 URINE PREGNANCY TEST: CPT | Performed by: INTERNAL MEDICINE

## 2022-11-07 PROCEDURE — D9220A PRA ANESTHESIA: ICD-10-PCS | Mod: ANES,,, | Performed by: ANESTHESIOLOGY

## 2022-11-07 PROCEDURE — 43239 EGD BIOPSY SINGLE/MULTIPLE: CPT | Mod: ,,, | Performed by: INTERNAL MEDICINE

## 2022-11-07 PROCEDURE — 43239 EGD BIOPSY SINGLE/MULTIPLE: CPT | Performed by: INTERNAL MEDICINE

## 2022-11-07 PROCEDURE — 88305 TISSUE EXAM BY PATHOLOGIST: ICD-10-PCS | Mod: 26,,, | Performed by: PATHOLOGY

## 2022-11-07 PROCEDURE — D9220A PRA ANESTHESIA: Mod: ANES,,, | Performed by: ANESTHESIOLOGY

## 2022-11-07 PROCEDURE — 27201012 HC FORCEPS, HOT/COLD, DISP: Performed by: INTERNAL MEDICINE

## 2022-11-07 PROCEDURE — 25000003 PHARM REV CODE 250: Performed by: INTERNAL MEDICINE

## 2022-11-07 PROCEDURE — D9220A PRA ANESTHESIA: Mod: CRNA,,, | Performed by: NURSE ANESTHETIST, CERTIFIED REGISTERED

## 2022-11-07 PROCEDURE — 37000008 HC ANESTHESIA 1ST 15 MINUTES: Performed by: INTERNAL MEDICINE

## 2022-11-07 PROCEDURE — D9220A PRA ANESTHESIA: ICD-10-PCS | Mod: CRNA,,, | Performed by: NURSE ANESTHETIST, CERTIFIED REGISTERED

## 2022-11-07 PROCEDURE — 88305 TISSUE EXAM BY PATHOLOGIST: CPT | Performed by: PATHOLOGY

## 2022-11-07 PROCEDURE — 25000003 PHARM REV CODE 250: Performed by: NURSE ANESTHETIST, CERTIFIED REGISTERED

## 2022-11-07 PROCEDURE — 37000009 HC ANESTHESIA EA ADD 15 MINS: Performed by: INTERNAL MEDICINE

## 2022-11-07 PROCEDURE — 63600175 PHARM REV CODE 636 W HCPCS: Performed by: NURSE ANESTHETIST, CERTIFIED REGISTERED

## 2022-11-07 RX ORDER — LIDOCAINE HYDROCHLORIDE 20 MG/ML
INJECTION, SOLUTION EPIDURAL; INFILTRATION; INTRACAUDAL; PERINEURAL
Status: DISCONTINUED
Start: 2022-11-07 | End: 2022-11-07 | Stop reason: HOSPADM

## 2022-11-07 RX ORDER — PROPOFOL 10 MG/ML
INJECTION, EMULSION INTRAVENOUS
Status: DISCONTINUED
Start: 2022-11-07 | End: 2022-11-07 | Stop reason: HOSPADM

## 2022-11-07 RX ORDER — LIDOCAINE HYDROCHLORIDE 20 MG/ML
INJECTION INTRAVENOUS
Status: DISCONTINUED | OUTPATIENT
Start: 2022-11-07 | End: 2022-11-07

## 2022-11-07 RX ORDER — PROPOFOL 10 MG/ML
VIAL (ML) INTRAVENOUS
Status: DISCONTINUED | OUTPATIENT
Start: 2022-11-07 | End: 2022-11-07

## 2022-11-07 RX ORDER — SODIUM CHLORIDE 9 MG/ML
INJECTION, SOLUTION INTRAVENOUS CONTINUOUS
Status: DISCONTINUED | OUTPATIENT
Start: 2022-11-07 | End: 2022-11-07 | Stop reason: HOSPADM

## 2022-11-07 RX ADMIN — PROPOFOL 100 MG: 10 INJECTION, EMULSION INTRAVENOUS at 03:11

## 2022-11-07 RX ADMIN — LIDOCAINE HYDROCHLORIDE 100 MG: 20 INJECTION, SOLUTION INTRAVENOUS at 03:11

## 2022-11-07 RX ADMIN — SODIUM CHLORIDE: 0.9 INJECTION, SOLUTION INTRAVENOUS at 02:11

## 2022-11-07 RX ADMIN — PROPOFOL 150 MG: 10 INJECTION, EMULSION INTRAVENOUS at 03:11

## 2022-11-07 RX ADMIN — PROPOFOL 50 MG: 10 INJECTION, EMULSION INTRAVENOUS at 03:11

## 2022-11-07 NOTE — ANESTHESIA POSTPROCEDURE EVALUATION
Anesthesia Post Evaluation    Patient: Elvia Carr    Procedure(s) Performed: Procedure(s) (LRB):  EGD (ESOPHAGOGASTRODUODENOSCOPY) (N/A)    Final Anesthesia Type: general      Patient location during evaluation: GI PACU  Patient participation: Yes- Able to Participate  Level of consciousness: awake and alert  Post-procedure vital signs: reviewed and stable  Pain management: adequate  Airway patency: patent    PONV status at discharge: No PONV  Anesthetic complications: no      Cardiovascular status: hemodynamically stable  Respiratory status: unassisted and spontaneous ventilation  Hydration status: euvolemic  Follow-up not needed.          Vitals Value Taken Time   /78 11/07/22 1530   Temp 37 °C (98.6 °F) 11/07/22 1530   Pulse 114 11/07/22 1530   Resp 17 11/07/22 1530   SpO2 98 % 11/07/22 1530         No case tracking events are documented in the log.      Pain/Natalio Score: Natalio Score: 9 (11/7/2022  3:30 PM)

## 2022-11-07 NOTE — PLAN OF CARE
Procedure and recovery complete. Awake and alert. No c/o pain or discomfort. Resp. Even and unlabored. Father at bedside. Discharge instructions given. Verbalized understanding. Gait steady. Able to ambulate without assistance. No acute distress noted.

## 2022-11-07 NOTE — TRANSFER OF CARE
Anesthesia Transfer of Care Note    Patient: Elvia Carr    Procedure(s) Performed: Procedure(s) (LRB):  EGD (ESOPHAGOGASTRODUODENOSCOPY) (N/A)    Patient location: GI    Anesthesia Type: general    Transport from OR: Transported from OR on room air with adequate spontaneous ventilation    Post pain: adequate analgesia    Post assessment: no apparent anesthetic complications and tolerated procedure well    Post vital signs: stable    Level of consciousness: awake, alert and oriented    Nausea/Vomiting: no nausea/vomiting    Complications: none    Transfer of care protocol was followed      Last vitals:   Visit Vitals  /78 (BP Location: Left arm)   Pulse (!) 114   Temp 37 °C (98.6 °F) (Oral)   Resp 17   SpO2 98%   Breastfeeding No

## 2022-11-07 NOTE — ANESTHESIA PREPROCEDURE EVALUATION
11/07/2022  Elvia Carr is a 22 y.o., female.    Pre-operative evaluation for Procedure(s) (LRB):  EGD (ESOPHAGOGASTRODUODENOSCOPY) (N/A)    NPO >8  METS >4        Patient Active Problem List   Diagnosis    Ulcerative proctitis with rectal bleeding    Anxiety and depression    Ulcerative colitis    ADHD, predominantly inattentive type       Review of patient's allergies indicates:   Allergen Reactions    Thimerosal Blisters, Dermatitis, Itching, Rash and Swelling       No current facility-administered medications on file prior to encounter.     Current Outpatient Medications on File Prior to Encounter   Medication Sig Dispense Refill    dextroamphetamine-amphetamine (ADDERALL) 20 mg tablet Take 1 tablet by mouth 2 (two) times daily. 60 tablet 0    drospirenone-ethinyl estradioL (ANNABELLE) 3-0.03 mg per tablet Take 1 tablet by mouth once daily. 28 tablet 1    DULoxetine (CYMBALTA) 30 MG capsule Take 30 mg by mouth once daily.      DULoxetine (CYMBALTA) 60 MG capsule Take 60 mg by mouth once daily.      guanFACINE (TENEX) 1 MG Tab Take 1 mg by mouth nightly as needed.      omeprazole (PRILOSEC) 40 MG capsule Take 1 capsule (40 mg total) by mouth 2 (two) times daily before meals. 60 capsule 11    ondansetron (ZOFRAN-ODT) 4 MG TbDL Take 1 tablet (4 mg total) by mouth every 8 (eight) hours as needed (nausea). 30 tablet 0    pantoprazole (PROTONIX) 40 MG tablet Take 1 tablet (40 mg total) by mouth once daily. 30 tablet 11    triamcinolone acetonide 0.5% (KENALOG) 0.5 % Crea Apply twice a day for 2 weeks, then once a week thereafter 454 g 1       Past Surgical History:   Procedure Laterality Date    COLONOSCOPY N/A 04/17/2017    Procedure: COLONOSCOPY;  Surgeon: Drake Arnold MD;  Location: [facility];  Service: Endoscopy;  Laterality: N/A;    COLONOSCOPY N/A 08/23/2019     Procedure: COLONOSCOPY;  Surgeon: Taylor Cook MD;  Location: [facility];  Service: Endoscopy;  Laterality: N/A;    INSERTION OF CONTRACEPTIVE CAPSULE  05/2016    NONE         Social History     Socioeconomic History    Marital status: Single    Number of children: 0   Occupational History    Occupation: student   Tobacco Use    Smoking status: Some Days     Packs/day: 0.00     Years: 1.00     Pack years: 0.00     Types: Cigarettes    Smokeless tobacco: Never   Substance and Sexual Activity    Alcohol use: Yes     Alcohol/week: 1.0 - 2.0 standard drink     Types: 1 - 2 Shots of liquor per week    Drug use: No    Sexual activity: Yes     Partners: Male     Birth control/protection: OCP     Comment: Jenifer   Social History Narrative    Exercises regularly (3-5 days weekly): resistance/cardio.      Diet is fair/good.      Satisfied with weight.    She does drink at least 1/2 gallon water daily.    She drinks 0 coffee/tea/caffeine-containing soft drinks daily.    Total sleep time at night is 7 hours.    She does wear seat belts.    Hobbies include none.         Lab Results   Component Value Date    WBC 9.35 05/31/2022    HGB 13.3 05/31/2022    HCT 41.5 05/31/2022    MCV 91 05/31/2022     05/31/2022       CMP  Sodium   Date Value Ref Range Status   01/03/2019 138 136 - 145 mmol/L Final     Potassium   Date Value Ref Range Status   01/03/2019 4.0 3.5 - 5.1 mmol/L Final     Chloride   Date Value Ref Range Status   01/03/2019 106 95 - 110 mmol/L Final     CO2   Date Value Ref Range Status   01/03/2019 24 23 - 29 mmol/L Final     Glucose   Date Value Ref Range Status   01/03/2019 98 70 - 110 mg/dL Final     BUN   Date Value Ref Range Status   01/03/2019 10 6 - 20 mg/dL Final     Creatinine   Date Value Ref Range Status   01/03/2019 0.7 0.5 - 1.4 mg/dL Final     Calcium   Date Value Ref Range Status   01/03/2019 9.6 8.7 - 10.5 mg/dL Final     Total Protein   Date Value Ref Range Status   01/03/2019 7.5  6.0 - 8.4 g/dL Final     Albumin   Date Value Ref Range Status   2019 4.1 3.5 - 5.2 g/dL Final     Total Bilirubin   Date Value Ref Range Status   2019 0.4 0.1 - 1.0 mg/dL Final     Comment:     For infants and newborns, interpretation of results should be based  on gestational age, weight and in agreement with clinical  observations.  Premature Infant recommended reference ranges:  Up to 24 hours.............<8.0 mg/dL  Up to 48 hours............<12.0 mg/dL  3-5 days..................<15.0 mg/dL  6-29 days.................<15.0 mg/dL       Alkaline Phosphatase   Date Value Ref Range Status   2019 41 (L) 55 - 135 U/L Final     AST   Date Value Ref Range Status   2019 20 10 - 40 U/L Final     ALT   Date Value Ref Range Status   2019 22 10 - 44 U/L Final     Anion Gap   Date Value Ref Range Status   2019 8 8 - 16 mmol/L Final     eGFR if    Date Value Ref Range Status   2019 >60 >60 mL/min/1.73 m^2 Final     eGFR if non    Date Value Ref Range Status   2019 >60 >60 mL/min/1.73 m^2 Final     Comment:     Calculation used to obtain the estimated glomerular filtration  rate (eGFR) is the CKD-EPI equation.            Diagnostic Studies:      EKD Echo:  No results found for this or any previous visit.    Pre-op Assessment    I have reviewed the Patient Summary Reports.    I have reviewed the NPO Status.   I have reviewed the Medications.     Review of Systems  Anesthesia Hx:  No problems with previous Anesthesia  History of prior surgery of interest to airway management or planning: Denies Family Hx of Anesthesia complications.   Denies Personal Hx of Anesthesia complications.   Social:  Smoker, Alcohol Use    Hematology/Oncology:  Hematology Normal        Cardiovascular:  Cardiovascular Normal Exercise tolerance: good     Pulmonary:   Asthma asymptomatic    Renal/:  Renal/ Normal     Hepatic/GI:   PUD, GERD UC    Neurological:  Neurology Normal    Endocrine:  Endocrine Normal    Psych:   Psychiatric History          Physical Exam  General: Cooperative, Alert, Well nourished and Oriented    Airway:  Mallampati: III   Mouth Opening: Normal  TM Distance: > 6 cm      Dental:  Intact        Anesthesia Plan  Type of Anesthesia, risks & benefits discussed:    Anesthesia Type: Gen Natural Airway  Intra-op Monitoring Plan: Standard ASA Monitors  Induction:  IV  Informed Consent: Informed consent signed with the Patient and all parties understand the risks and agree with anesthesia plan.  All questions answered.   ASA Score: 2    Ready For Surgery From Anesthesia Perspective.     .

## 2022-11-07 NOTE — PROVATION PATIENT INSTRUCTIONS
Discharge Summary/Instructions after an Endoscopic Procedure  Patient Name: Elvia Carr  Patient MRN: [id number]  Patient YOB: 2000 Monday, November 7, 2022  Willie Roque MD  Dear patient,  As a result of recent federal legislation (The Federal Cures Act), you may   receive lab or pathology results from your procedure in your [facility]   account before your physician is able to contact you. Your physician or   their representative will relay the results to you with their   recommendations at their soonest availability.  Thank you,  RESTRICTIONS:  During your procedure today, you received medications for sedation.  These   medications may affect your judgment, balance and coordination.  Therefore,   for 24 hours, you have the following restrictions:   - DO NOT drive a car, operate machinery, make legal/financial decisions,   sign important papers or drink alcohol.    ACTIVITY:  Today: no heavy lifting, straining or running due to procedural   sedation/anesthesia.  The following day: return to full activity including work.  DIET:  Eat and drink normally unless instructed otherwise.     TREATMENT FOR COMMON SIDE EFFECTS:  - Mild abdominal pain, nausea, belching, bloating or excessive gas:  rest,   eat lightly and use a heating pad.  - Sore Throat: treat with throat lozenges and/or gargle with warm salt   water.  - Because air was used during the procedure, expelling large amounts of air   from your rectum or belching is normal.  - If a bowel prep was taken, you may not have a bowel movement for 1-3 days.    This is normal.  SYMPTOMS TO WATCH FOR AND REPORT TO YOUR PHYSICIAN:  1. Abdominal pain or bloating, other than gas cramps.  2. Chest pain.  3. Back pain.  4. Signs of infection such as: chills or fever occurring within 24 hours   after the procedure.  5. Rectal bleeding, which would show as bright red, maroon, or black stools.   (A tablespoon of blood from the rectum is not serious, especially  if   hemorrhoids are present.)  6. Vomiting.  7. Weakness or dizziness.  GO DIRECTLY TO THE NEAREST EMERGENCY ROOM IF YOU HAVE ANY OF THE FOLLOWING:      Difficulty breathing              Chills and/or fever over 101 F   Persistent vomiting and/or vomiting blood   Severe abdominal pain   Severe chest pain   Black, tarry stools   Bleeding- more than one tablespoon   Any other symptom or condition that you feel may need urgent attention  Your doctor recommends these additional instructions:  If any biopsies were taken, your doctors clinic will contact you in 1 to 2   weeks with any results.  - Discharge patient to home.   - Resume previous diet.   - Continue present medications.   - Await pathology results.   - Return to GI clinic as previously scheduled.  For questions, problems or results please call your physician - Willie Roque MD at Work:  [phone].  [facility] can be reached at [phone]     IF A COMPLICATION OR EMERGENCY SITUATION ARISES AND YOU ARE UNABLE TO REACH   YOUR PHYSICIAN - GO DIRECTLY TO THE EMERGENCY ROOM.  Willie Roque MD  11/7/2022 3:25:23 PM  This report has been verified and signed electronically.  Dear patient,  As a result of recent federal legislation (The Federal Cures Act), you may   receive lab or pathology results from your procedure in your [facility]   account before your physician is able to contact you. Your physician or   their representative will relay the results to you with their   recommendations at their soonest availability.  Thank you,  PROVATION

## 2022-11-07 NOTE — H&P
Short Stay Endoscopy   Pre-Procedure Note    PCP - Primary Doctor No  Referring Physician - Willie Roque MD  [address] [place] [address]    Procedure - Endoscopy    ASA - per anesthesia  Mallampati - per anesthesia    HPI  22 y.o. female scheduled for endoscopy for evaluation of    1. Epigastric pain         Medical History:  has a past medical history of Asthma and UC (ulcerative colitis) (04/2017).    Surgical History:  has a past surgical history that includes NONE; Colonoscopy (N/A, 04/17/2017); Insertion of contraceptive capsule (05/2016); and Colonoscopy (N/A, 08/23/2019).    Family History: family history includes Alcohol abuse in her maternal grandmother; Arthritis in her mother; Depression in her mother; Heart failure in her maternal grandfather; Learning disabilities in her mother; Migraines in her mother; Miscarriages / Stillbirths in her mother; Osteoarthritis in her mother..    Social History:  reports that she has been smoking cigarettes. She has never used smokeless tobacco. She reports current alcohol use of about 1.0 - 2.0 standard drink per week. She reports that she does not use drugs.    Review of patient's allergies indicates:   Allergen Reactions    Thimerosal Blisters, Dermatitis, Itching, Rash and Swelling       Medications:   Medications Prior to Admission   Medication Sig Dispense Refill Last Dose    DULoxetine (CYMBALTA) 30 MG capsule Take 30 mg by mouth once daily.   11/6/2022    DULoxetine (CYMBALTA) 60 MG capsule Take 60 mg by mouth once daily.   11/6/2022    guanFACINE (TENEX) 1 MG Tab Take 1 mg by mouth nightly as needed.   11/6/2022    omeprazole (PRILOSEC) 40 MG capsule Take 1 capsule (40 mg total) by mouth 2 (two) times daily before meals. 60 capsule 11 11/6/2022    pantoprazole (PROTONIX) 40 MG tablet Take 1 tablet (40 mg total) by mouth once daily. 30 tablet 11 11/6/2022    dextroamphetamine-amphetamine (ADDERALL) 20 mg tablet Take 1 tablet by mouth 2 (two)  times daily. 60 tablet 0     drospirenone-ethinyl estradioL (ANNABELLE) 3-0.03 mg per tablet Take 1 tablet by mouth once daily. 28 tablet 1     ondansetron (ZOFRAN-ODT) 4 MG TbDL Take 1 tablet (4 mg total) by mouth every 8 (eight) hours as needed (nausea). 30 tablet 0     triamcinolone acetonide 0.5% (KENALOG) 0.5 % Crea Apply twice a day for 2 weeks, then once a week thereafter 454 g 1          Labs:  Lab Results   Component Value Date    WBC 9.35 05/31/2022    HGB 13.3 05/31/2022    HCT 41.5 05/31/2022     05/31/2022    CHOL 132 06/15/2018    TRIG 59 06/15/2018    HDL 49 06/15/2018    ALT 22 01/03/2019    AST 20 01/03/2019     01/03/2019    K 4.0 01/03/2019     01/03/2019    CREATININE 0.7 01/03/2019    BUN 10 01/03/2019    CO2 24 01/03/2019    TSH 1.537 06/15/2018    INR 1.0 03/28/2017       Risks and benefits of this endoscopic procedure, including but not limited to bleeding, inflammation, infection, perforation, and death, explained to the patient prior to procedure      Willie Roque MD

## 2022-11-09 LAB
FINAL PATHOLOGIC DIAGNOSIS: NORMAL
GROSS: NORMAL
Lab: NORMAL

## 2022-11-10 ENCOUNTER — LAB VISIT (OUTPATIENT)
Dept: LAB | Facility: HOSPITAL | Age: 22
End: 2022-11-10
Attending: OBSTETRICS & GYNECOLOGY
Payer: COMMERCIAL

## 2022-11-10 ENCOUNTER — OFFICE VISIT (OUTPATIENT)
Dept: OBSTETRICS AND GYNECOLOGY | Facility: CLINIC | Age: 22
End: 2022-11-10
Payer: COMMERCIAL

## 2022-11-10 VITALS
DIASTOLIC BLOOD PRESSURE: 80 MMHG | WEIGHT: 169.06 LBS | HEIGHT: 65 IN | BODY MASS INDEX: 28.17 KG/M2 | SYSTOLIC BLOOD PRESSURE: 122 MMHG

## 2022-11-10 DIAGNOSIS — Z30.41 SURVEILLANCE OF PREVIOUSLY PRESCRIBED CONTRACEPTIVE PILL: ICD-10-CM

## 2022-11-10 DIAGNOSIS — Z71.1 CONCERN ABOUT STD IN FEMALE WITHOUT DIAGNOSIS: ICD-10-CM

## 2022-11-10 DIAGNOSIS — Z01.419 WELL WOMAN EXAM WITH ROUTINE GYNECOLOGICAL EXAM: Primary | ICD-10-CM

## 2022-11-10 DIAGNOSIS — Z01.419 WELL WOMAN EXAM WITH ROUTINE GYNECOLOGICAL EXAM: ICD-10-CM

## 2022-11-10 PROCEDURE — 3008F PR BODY MASS INDEX (BMI) DOCUMENTED: ICD-10-PCS | Mod: CPTII,S$GLB,, | Performed by: OBSTETRICS & GYNECOLOGY

## 2022-11-10 PROCEDURE — 86592 SYPHILIS TEST NON-TREP QUAL: CPT | Performed by: OBSTETRICS & GYNECOLOGY

## 2022-11-10 PROCEDURE — 80074 ACUTE HEPATITIS PANEL: CPT | Performed by: OBSTETRICS & GYNECOLOGY

## 2022-11-10 PROCEDURE — 3079F PR MOST RECENT DIASTOLIC BLOOD PRESSURE 80-89 MM HG: ICD-10-PCS | Mod: CPTII,S$GLB,, | Performed by: OBSTETRICS & GYNECOLOGY

## 2022-11-10 PROCEDURE — 99395 PR PREVENTIVE VISIT,EST,18-39: ICD-10-PCS | Mod: S$GLB,,, | Performed by: OBSTETRICS & GYNECOLOGY

## 2022-11-10 PROCEDURE — 87389 HIV-1 AG W/HIV-1&-2 AB AG IA: CPT | Performed by: OBSTETRICS & GYNECOLOGY

## 2022-11-10 PROCEDURE — 99999 PR PBB SHADOW E&M-EST. PATIENT-LVL III: ICD-10-PCS | Mod: PBBFAC,,, | Performed by: OBSTETRICS & GYNECOLOGY

## 2022-11-10 PROCEDURE — 99999 PR PBB SHADOW E&M-EST. PATIENT-LVL III: CPT | Mod: PBBFAC,,, | Performed by: OBSTETRICS & GYNECOLOGY

## 2022-11-10 PROCEDURE — 1159F PR MEDICATION LIST DOCUMENTED IN MEDICAL RECORD: ICD-10-PCS | Mod: CPTII,S$GLB,, | Performed by: OBSTETRICS & GYNECOLOGY

## 2022-11-10 PROCEDURE — 1160F PR REVIEW ALL MEDS BY PRESCRIBER/CLIN PHARMACIST DOCUMENTED: ICD-10-PCS | Mod: CPTII,S$GLB,, | Performed by: OBSTETRICS & GYNECOLOGY

## 2022-11-10 PROCEDURE — 87491 CHLMYD TRACH DNA AMP PROBE: CPT | Performed by: OBSTETRICS & GYNECOLOGY

## 2022-11-10 PROCEDURE — 99395 PREV VISIT EST AGE 18-39: CPT | Mod: S$GLB,,, | Performed by: OBSTETRICS & GYNECOLOGY

## 2022-11-10 PROCEDURE — 3074F PR MOST RECENT SYSTOLIC BLOOD PRESSURE < 130 MM HG: ICD-10-PCS | Mod: CPTII,S$GLB,, | Performed by: OBSTETRICS & GYNECOLOGY

## 2022-11-10 PROCEDURE — 1160F RVW MEDS BY RX/DR IN RCRD: CPT | Mod: CPTII,S$GLB,, | Performed by: OBSTETRICS & GYNECOLOGY

## 2022-11-10 PROCEDURE — 36415 COLL VENOUS BLD VENIPUNCTURE: CPT | Mod: PN | Performed by: OBSTETRICS & GYNECOLOGY

## 2022-11-10 PROCEDURE — 1159F MED LIST DOCD IN RCRD: CPT | Mod: CPTII,S$GLB,, | Performed by: OBSTETRICS & GYNECOLOGY

## 2022-11-10 PROCEDURE — 3079F DIAST BP 80-89 MM HG: CPT | Mod: CPTII,S$GLB,, | Performed by: OBSTETRICS & GYNECOLOGY

## 2022-11-10 PROCEDURE — 87591 N.GONORRHOEAE DNA AMP PROB: CPT | Performed by: OBSTETRICS & GYNECOLOGY

## 2022-11-10 PROCEDURE — 3074F SYST BP LT 130 MM HG: CPT | Mod: CPTII,S$GLB,, | Performed by: OBSTETRICS & GYNECOLOGY

## 2022-11-10 PROCEDURE — 3008F BODY MASS INDEX DOCD: CPT | Mod: CPTII,S$GLB,, | Performed by: OBSTETRICS & GYNECOLOGY

## 2022-11-10 PROCEDURE — 81514 NFCT DS BV&VAGINITIS DNA ALG: CPT | Performed by: OBSTETRICS & GYNECOLOGY

## 2022-11-10 RX ORDER — DROSPIRENONE AND ETHINYL ESTRADIOL 0.03MG-3MG
1 KIT ORAL DAILY
Qty: 30 TABLET | Refills: 13 | Status: SHIPPED | OUTPATIENT
Start: 2022-11-10 | End: 2023-06-21 | Stop reason: SDUPTHER

## 2022-11-10 NOTE — PROGRESS NOTES
Subjective:     Patient ID: Elvia Carr is a 22 y.o. female.     Chief Complaint: No chief complaint on file.     History of Present Illness: This patient is a 22 y.o.  female, who presents to the GYN clinic for her gyn well woman yearly exam.  She had a negative Pap smear in May of 2021.  She uses Deanna birth control pills for contraception.  She denies gyn complaints.      Patient's last menstrual period was 10/13/2022.    Review of Systems    GENERAL: No fever, chills, fatigability or weightchange  SKIN: No rashes, itching or changes in color or texture of skin.  HEAD: No headaches or recent head trauma.  EYES: Visual acuity fine. No photophobia,r diplopia.  EARS: Denies earache or vertigo  NOSE: No loss of smell, no epistaxis or postnasal drip.  MOUTH & THROAT: No hoarseness or change in voice.   NODES: Denies swollen glands.  CHEST: Denies WOLFF, cyanosis, wheezing, cough and sputum production.  CARDIOVASCULAR: Denies chest pain, PND, orthopnea or reduced exercise tolerance.  ABDOMEN: Appetite fine. No weight loss. bloating, Denies diarrhea, abdominal pain, hematemesis or blood in stool.  URINARY: No flank pain, dysuria or hematuria.  PERIPHERAL VASCULAR: No claudication or cyanosis.Varicosities  MUSCULOSKELETAL: No joint stiffness or swelling. Denies back pain.muscle aches  NEUROLOGIC: No history of seizures, paralysis, alteration of gait or coordination.       Objective:       Physical Exam     APPEARANCE: Well nourished, well developed, in no acute distress.    GENITOURINARY:  Vulva: No lesions. Normal female genital architecture.  Urethral Meatus: Normal size and location, no lesions, no prolapse.  Urethra: No masses, tenderness, prolapse or scarring.  Vagina: Moist with rugae, no discharge, no significant cystocele or rectocele.  Cervix: No lesions, normal diameter, no stenosis, no cervical motion tenderness. .  Uterus: 6 week size, regular shape, mobile, non-tender, normal position, good  support.  Adnexa: No masses, tenderness or CDS nodularity.  Anus Perineum: No lesions, no relaxation, no external hemorrhoids.  Breasts: Symmetrical, no skin changes or visible lesions. No palpable masses, nipple discharge or adenopathy bilaterally.  Abdomen: No masses, tenderness, hernia or ascites, no hepatasplenomegaly  Neck: Supple. Symmetric without masses. No thyromegaly.  Skin: No rashes, lesions, ulcers, acne, hirsutism.  Respiratory: Breath sounds clear bilateraly. Good air movement. No rales. No wheezes.  Cardiovascular: Normal rate. Regular rhythm.  Peripheral/lower extremities: No edema, erythema or tenderness.  Lymphatic: No axillary, neck or groin nodes palp.  Mental Status: Alert, oriented x 3, normal affect and mood.          @PROCEDURE:@           Assessment:      1. Well woman exam with routine gynecological exam    2. Surveillance of previously prescribed contraceptive pill    3. Concern about STD in female without diagnosis               Plan:  STD workup per patient request.  Continue birth control pill usage.  Return to clinic p.r.n. symptoms or for well-woman exam                        No orders of the defined types were placed in this encounter.

## 2022-11-11 ENCOUNTER — PATIENT MESSAGE (OUTPATIENT)
Dept: GASTROENTEROLOGY | Facility: CLINIC | Age: 22
End: 2022-11-11
Payer: COMMERCIAL

## 2022-11-11 DIAGNOSIS — R10.9 ABDOMINAL PAIN, UNSPECIFIED ABDOMINAL LOCATION: Primary | ICD-10-CM

## 2022-11-11 LAB
HAV IGM SERPL QL IA: NORMAL
HBV CORE IGM SERPL QL IA: NORMAL
HBV SURFACE AG SERPL QL IA: NORMAL
HCV AB SERPL QL IA: NORMAL
HIV 1+2 AB+HIV1 P24 AG SERPL QL IA: NORMAL
RPR SER QL: NORMAL

## 2022-11-11 NOTE — PROGRESS NOTES
Called patient to discuss another flare of nausea.  With recent normal endoscopy, I recommended proceeding with right upper quadrant ultrasound to evaluate for biliary disease.  She also requested a referral to Allergy to rule out food allergy as cause.  Referral placed.  I also recommended trial of peppermint oil such as FDGuard

## 2022-11-12 LAB
BACTERIAL VAGINOSIS DNA: NEGATIVE
C TRACH DNA SPEC QL NAA+PROBE: NOT DETECTED
CANDIDA GLABRATA DNA: NEGATIVE
CANDIDA KRUSEI DNA: NEGATIVE
CANDIDA RRNA VAG QL PROBE: NEGATIVE
N GONORRHOEA DNA SPEC QL NAA+PROBE: NOT DETECTED
T VAGINALIS RRNA GENITAL QL PROBE: NEGATIVE

## 2022-11-29 ENCOUNTER — HOSPITAL ENCOUNTER (OUTPATIENT)
Dept: RADIOLOGY | Facility: HOSPITAL | Age: 22
Discharge: HOME | End: 2022-11-29
Attending: INTERNAL MEDICINE
Payer: COMMERCIAL

## 2022-11-29 DIAGNOSIS — R10.9 ABDOMINAL PAIN, UNSPECIFIED ABDOMINAL LOCATION: ICD-10-CM

## 2022-11-29 PROCEDURE — 76705 US ABDOMEN LIMITED: ICD-10-PCS | Mod: 26,,, | Performed by: INTERNAL MEDICINE

## 2022-11-29 PROCEDURE — 76705 ECHO EXAM OF ABDOMEN: CPT | Mod: TC

## 2022-11-29 PROCEDURE — 76705 ECHO EXAM OF ABDOMEN: CPT | Mod: 26,,, | Performed by: INTERNAL MEDICINE

## 2023-03-08 ENCOUNTER — TELEPHONE (OUTPATIENT)
Dept: OBSTETRICS AND GYNECOLOGY | Facility: CLINIC | Age: 23
End: 2023-03-08
Payer: COMMERCIAL

## 2023-03-08 NOTE — TELEPHONE ENCOUNTER
----- Message from Jalyn Roth sent at 3/8/2023 11:32 AM CST -----  Pt needs a refill on her Deanna birth control. She stated she submitted a request through the portal 2x already. She only have 2 days left. Pt# [phone]

## 2023-06-21 DIAGNOSIS — Z30.41 SURVEILLANCE OF PREVIOUSLY PRESCRIBED CONTRACEPTIVE PILL: ICD-10-CM

## 2023-06-21 DIAGNOSIS — Z71.1 CONCERN ABOUT STD IN FEMALE WITHOUT DIAGNOSIS: ICD-10-CM

## 2023-06-21 DIAGNOSIS — Z01.419 WELL WOMAN EXAM WITH ROUTINE GYNECOLOGICAL EXAM: ICD-10-CM

## 2023-06-21 RX ORDER — DROSPIRENONE AND ETHINYL ESTRADIOL 0.03MG-3MG
1 KIT ORAL DAILY
Qty: 30 TABLET | Refills: 13 | Status: SHIPPED | OUTPATIENT
Start: 2023-06-21 | End: 2024-07-17

## 2024-01-03 ENCOUNTER — OFFICE VISIT (OUTPATIENT)
Dept: OBSTETRICS AND GYNECOLOGY | Facility: CLINIC | Age: 24
End: 2024-01-03
Payer: COMMERCIAL

## 2024-01-03 VITALS — BODY MASS INDEX: 27.99 KG/M2 | HEIGHT: 65 IN | WEIGHT: 168 LBS

## 2024-01-03 DIAGNOSIS — Z30.41 ENCOUNTER FOR BIRTH CONTROL PILLS MAINTENANCE: ICD-10-CM

## 2024-01-03 DIAGNOSIS — Z01.419 WELL WOMAN EXAM WITH ROUTINE GYNECOLOGICAL EXAM: Primary | ICD-10-CM

## 2024-01-03 DIAGNOSIS — Z20.2 POSSIBLE EXPOSURE TO STD: ICD-10-CM

## 2024-01-03 PROCEDURE — 87491 CHLMYD TRACH DNA AMP PROBE: CPT | Performed by: OBSTETRICS & GYNECOLOGY

## 2024-01-03 PROCEDURE — 99999 PR PBB SHADOW E&M-EST. PATIENT-LVL III: CPT | Mod: PBBFAC,,, | Performed by: OBSTETRICS & GYNECOLOGY

## 2024-01-03 PROCEDURE — 1159F MED LIST DOCD IN RCRD: CPT | Mod: CPTII,S$GLB,, | Performed by: OBSTETRICS & GYNECOLOGY

## 2024-01-03 PROCEDURE — 99395 PREV VISIT EST AGE 18-39: CPT | Mod: S$GLB,,, | Performed by: OBSTETRICS & GYNECOLOGY

## 2024-01-03 PROCEDURE — 3008F BODY MASS INDEX DOCD: CPT | Mod: CPTII,S$GLB,, | Performed by: OBSTETRICS & GYNECOLOGY

## 2024-01-03 PROCEDURE — 1160F RVW MEDS BY RX/DR IN RCRD: CPT | Mod: CPTII,S$GLB,, | Performed by: OBSTETRICS & GYNECOLOGY

## 2024-01-03 RX ORDER — DEXTROAMPHETAMINE SACCHARATE, AMPHETAMINE ASPARTATE MONOHYDRATE, DEXTROAMPHETAMINE SULFATE AND AMPHETAMINE SULFATE 5; 5; 5; 5 MG/1; MG/1; MG/1; MG/1
CAPSULE, EXTENDED RELEASE ORAL EVERY MORNING
COMMUNITY
Start: 2023-12-21

## 2024-01-03 NOTE — PROGRESS NOTES
Subjective:     Patient ID: Elvia Carr is a 23 y.o. female.     Chief Complaint: Well Woman     History of Present Illness: This patient is a 23 y.o.  female, who presents to the GYN clinic for her gyn well woman yearly exam.  She is using birth control pills for contraception.  She denies gyn complaints.  She request STD screening.      Patient's last menstrual period was 12/11/2023.    Review of Systems    GENERAL: No fever, chills, fatigability or weightchange  SKIN: No rashes, itching or changes in color or texture of skin.  HEAD: No headaches or recent head trauma.  EYES: Visual acuity fine. No photophobia,r diplopia.  EARS: Denies earache or vertigo  NOSE: No loss of smell, no epistaxis or postnasal drip.  MOUTH & THROAT: No hoarseness or change in voice.   NODES: Denies swollen glands.  CHEST: Denies WOLFF, cyanosis, wheezing, cough and sputum production.  CARDIOVASCULAR: Denies chest pain, PND, orthopnea or reduced exercise tolerance.  ABDOMEN: Appetite fine. No weight loss. bloating, Denies diarrhea, abdominal pain, hematemesis or blood in stool.  URINARY: No flank pain, dysuria or hematuria.  PERIPHERAL VASCULAR: No claudication or cyanosis.Varicosities  MUSCULOSKELETAL: No joint stiffness or swelling. Denies back pain.muscle aches  NEUROLOGIC: No history of seizures, paralysis, alteration of gait or coordination.       Objective:       Physical Exam     APPEARANCE: Well nourished, well developed, in no acute distress.    GENITOURINARY:  Vulva: No lesions. Normal female genital architecture.  Urethral Meatus: Normal size and location, no lesions, no prolapse.  Urethra: No masses, tenderness, prolapse or scarring.  Vagina:  Moist with rugae, no discharge, no significant cystocele or rectocele.  Cervix: No lesions, normal diameter, no stenosis, no cervical motion tenderness. .  Uterus: 6 week size, regular shape, mobile, non-tender, normal position, good support.  Adnexa: No masses, tenderness  or CDS nodularity.  Anus Perineum: No lesions, no relaxation, no external hemorrhoids.  Breasts: Symmetrical, no skin changes or visible lesions. No palpable masses, nipple discharge or adenopathy bilaterally.  Abdomen: No masses, tenderness, hernia or ascites, no hepatasplenomegaly  Neck: Supple. Symmetric without masses. No thyromegaly.  Skin: No rashes, lesions, ulcers, acne, hirsutism.  Respiratory: Breath sounds clear bilateraly. Good air movement. No rales. No wheezes.  Cardiovascular: Normal rate. Regular rhythm.  Peripheral/lower extremities: No edema, erythema or tenderness.  Lymphatic: No axillary, neck or groin nodes palp.  Mental Status: Alert, oriented x 3, normal affect and mood.          @PROCEDURE:@           Assessment:      1. Well woman exam with routine gynecological exam    2. Possible exposure to STD    3. Encounter for birth control pills maintenance               Plan:  Patient has no questions concerning the pros cons risks and benefits of contraceptive birth control pills.  The patient request STD screening.  Return to clinic p.r.n. symptoms or for well-woman exam.                  No orders of the defined types were placed in this encounter.

## 2024-01-04 LAB
C TRACH DNA SPEC QL NAA+PROBE: NOT DETECTED
N GONORRHOEA DNA SPEC QL NAA+PROBE: NOT DETECTED

## 2024-02-29 ENCOUNTER — PATIENT MESSAGE (OUTPATIENT)
Dept: OBSTETRICS AND GYNECOLOGY | Facility: CLINIC | Age: 24
End: 2024-02-29
Payer: COMMERCIAL

## 2024-05-23 ENCOUNTER — TELEPHONE (OUTPATIENT)
Dept: INTERNAL MEDICINE | Facility: CLINIC | Age: 24
End: 2024-05-23
Payer: COMMERCIAL

## 2024-05-23 NOTE — TELEPHONE ENCOUNTER
Spoke with patient and informed her that Dr. Lorenzo is not available until October/2024. So I offered an appointment with Dr. Martinez for a sooner appointment. Patient scheduled below:    Appointment Information   Name: KEVIN DUARTE MRN: [id number]   Date: 5/27/2024 Status: [facility]   Appt Time: 8:40 AM Length: 40   Visit Type: NP - PRIMARY CARE ([facility]) [476] Copay: $20.00   Provider: Randall Martinez MD Dept: [facility], Internal Medicine       Dept Address: [address] [place] [address]       Dept Phone Number: [phone]   Referring Provider:   MARTELL: [id number]   Appt Phone:     Notes: ECA   Made On: 5/23/2024 11:43 AM By: GABRIELLE OLMOS [[id number]] (ES)

## 2024-05-23 NOTE — TELEPHONE ENCOUNTER
----- Message from Dewitt Tiffanie Kerry sent at 5/23/2024 11:09 AM CDT -----  Regarding: call back  Type: Patient Call Back    Who called: pt     What is the request in detail: requesting call to get a new pt appt to [facility] care and get an annual exam prior to next opening late 2024    Can the clinic reply by [facility]?no    Would the patient rather a call back or a response via [facility]? call    Best call back number: [phone]     Additional Information:

## 2024-05-27 ENCOUNTER — OFFICE VISIT (OUTPATIENT)
Dept: INTERNAL MEDICINE | Facility: CLINIC | Age: 24
End: 2024-05-27
Payer: COMMERCIAL

## 2024-05-27 ENCOUNTER — LAB VISIT (OUTPATIENT)
Dept: LAB | Facility: OTHER | Age: 24
End: 2024-05-27
Attending: STUDENT IN AN ORGANIZED HEALTH CARE EDUCATION/TRAINING PROGRAM
Payer: COMMERCIAL

## 2024-05-27 VITALS
DIASTOLIC BLOOD PRESSURE: 84 MMHG | WEIGHT: 177 LBS | HEIGHT: 65 IN | BODY MASS INDEX: 29.49 KG/M2 | HEART RATE: 106 BPM | SYSTOLIC BLOOD PRESSURE: 130 MMHG | OXYGEN SATURATION: 98 %

## 2024-05-27 DIAGNOSIS — M65.4 DE QUERVAIN'S DISEASE (RADIAL STYLOID TENOSYNOVITIS): Primary | ICD-10-CM

## 2024-05-27 DIAGNOSIS — Z00.00 ANNUAL PHYSICAL EXAM: ICD-10-CM

## 2024-05-27 DIAGNOSIS — Z11.3 SCREENING EXAMINATION FOR STI: ICD-10-CM

## 2024-05-27 LAB
ALBUMIN SERPL BCP-MCNC: 3.9 G/DL (ref 3.5–5.2)
ALP SERPL-CCNC: 45 U/L (ref 55–135)
ALT SERPL W/O P-5'-P-CCNC: 27 U/L (ref 10–44)
ANION GAP SERPL CALC-SCNC: 10 MMOL/L (ref 8–16)
AST SERPL-CCNC: 23 U/L (ref 10–40)
BASOPHILS # BLD AUTO: 0.03 K/UL (ref 0–0.2)
BASOPHILS NFR BLD: 0.4 % (ref 0–1.9)
BILIRUB SERPL-MCNC: 0.2 MG/DL (ref 0.1–1)
BUN SERPL-MCNC: 16 MG/DL (ref 6–20)
CALCIUM SERPL-MCNC: 9.9 MG/DL (ref 8.7–10.5)
CHLORIDE SERPL-SCNC: 110 MMOL/L (ref 95–110)
CHOLEST SERPL-MCNC: 211 MG/DL (ref 120–199)
CHOLEST/HDLC SERPL: 3.1 {RATIO} (ref 2–5)
CO2 SERPL-SCNC: 23 MMOL/L (ref 23–29)
CREAT SERPL-MCNC: 0.8 MG/DL (ref 0.5–1.4)
DIFFERENTIAL METHOD BLD: NORMAL
EOSINOPHIL # BLD AUTO: 0.2 K/UL (ref 0–0.5)
EOSINOPHIL NFR BLD: 2.6 % (ref 0–8)
ERYTHROCYTE [DISTWIDTH] IN BLOOD BY AUTOMATED COUNT: 12.2 % (ref 11.5–14.5)
EST. GFR  (NO RACE VARIABLE): >60 ML/MIN/1.73 M^2
GLUCOSE SERPL-MCNC: 88 MG/DL (ref 70–110)
HCT VFR BLD AUTO: 43.9 % (ref 37–48.5)
HDLC SERPL-MCNC: 68 MG/DL (ref 40–75)
HDLC SERPL: 32.2 % (ref 20–50)
HGB BLD-MCNC: 14.2 G/DL (ref 12–16)
HIV 1+2 AB+HIV1 P24 AG SERPL QL IA: NEGATIVE
IMM GRANULOCYTES # BLD AUTO: 0.01 K/UL (ref 0–0.04)
IMM GRANULOCYTES NFR BLD AUTO: 0.1 % (ref 0–0.5)
LDLC SERPL CALC-MCNC: 121.4 MG/DL (ref 63–159)
LYMPHOCYTES # BLD AUTO: 3.3 K/UL (ref 1–4.8)
LYMPHOCYTES NFR BLD: 42.6 % (ref 18–48)
MCH RBC QN AUTO: 28.3 PG (ref 27–31)
MCHC RBC AUTO-ENTMCNC: 32.3 G/DL (ref 32–36)
MCV RBC AUTO: 88 FL (ref 82–98)
MONOCYTES # BLD AUTO: 0.5 K/UL (ref 0.3–1)
MONOCYTES NFR BLD: 6.5 % (ref 4–15)
NEUTROPHILS # BLD AUTO: 3.7 K/UL (ref 1.8–7.7)
NEUTROPHILS NFR BLD: 47.8 % (ref 38–73)
NONHDLC SERPL-MCNC: 143 MG/DL
NRBC BLD-RTO: 0 /100 WBC
PLATELET # BLD AUTO: 252 K/UL (ref 150–450)
PMV BLD AUTO: 9.2 FL (ref 9.2–12.9)
POTASSIUM SERPL-SCNC: 4.4 MMOL/L (ref 3.5–5.1)
PROT SERPL-MCNC: 7.3 G/DL (ref 6–8.4)
RBC # BLD AUTO: 5.01 M/UL (ref 4–5.4)
SODIUM SERPL-SCNC: 143 MMOL/L (ref 136–145)
TREPONEMA PALLIDUM IGG+IGM AB [PRESENCE] IN SERUM OR PLASMA BY IMMUNOASSAY: NONREACTIVE
TRIGL SERPL-MCNC: 108 MG/DL (ref 30–150)
TSH SERPL DL<=0.005 MIU/L-ACNC: 1.23 UIU/ML (ref 0.4–4)
WBC # BLD AUTO: 7.72 K/UL (ref 3.9–12.7)

## 2024-05-27 PROCEDURE — 1159F MED LIST DOCD IN RCRD: CPT | Mod: CPTII,S$GLB,, | Performed by: STUDENT IN AN ORGANIZED HEALTH CARE EDUCATION/TRAINING PROGRAM

## 2024-05-27 PROCEDURE — 3075F SYST BP GE 130 - 139MM HG: CPT | Mod: CPTII,S$GLB,, | Performed by: STUDENT IN AN ORGANIZED HEALTH CARE EDUCATION/TRAINING PROGRAM

## 2024-05-27 PROCEDURE — 80061 LIPID PANEL: CPT | Performed by: STUDENT IN AN ORGANIZED HEALTH CARE EDUCATION/TRAINING PROGRAM

## 2024-05-27 PROCEDURE — 36415 COLL VENOUS BLD VENIPUNCTURE: CPT | Performed by: STUDENT IN AN ORGANIZED HEALTH CARE EDUCATION/TRAINING PROGRAM

## 2024-05-27 PROCEDURE — 84443 ASSAY THYROID STIM HORMONE: CPT | Performed by: STUDENT IN AN ORGANIZED HEALTH CARE EDUCATION/TRAINING PROGRAM

## 2024-05-27 PROCEDURE — 87591 N.GONORRHOEAE DNA AMP PROB: CPT | Performed by: STUDENT IN AN ORGANIZED HEALTH CARE EDUCATION/TRAINING PROGRAM

## 2024-05-27 PROCEDURE — 85025 COMPLETE CBC W/AUTO DIFF WBC: CPT | Performed by: STUDENT IN AN ORGANIZED HEALTH CARE EDUCATION/TRAINING PROGRAM

## 2024-05-27 PROCEDURE — 87389 HIV-1 AG W/HIV-1&-2 AB AG IA: CPT | Performed by: STUDENT IN AN ORGANIZED HEALTH CARE EDUCATION/TRAINING PROGRAM

## 2024-05-27 PROCEDURE — 3079F DIAST BP 80-89 MM HG: CPT | Mod: CPTII,S$GLB,, | Performed by: STUDENT IN AN ORGANIZED HEALTH CARE EDUCATION/TRAINING PROGRAM

## 2024-05-27 PROCEDURE — 99999 PR PBB SHADOW E&M-EST. PATIENT-LVL III: CPT | Mod: PBBFAC,,, | Performed by: STUDENT IN AN ORGANIZED HEALTH CARE EDUCATION/TRAINING PROGRAM

## 2024-05-27 PROCEDURE — 86593 SYPHILIS TEST NON-TREP QUANT: CPT | Performed by: STUDENT IN AN ORGANIZED HEALTH CARE EDUCATION/TRAINING PROGRAM

## 2024-05-27 PROCEDURE — 3008F BODY MASS INDEX DOCD: CPT | Mod: CPTII,S$GLB,, | Performed by: STUDENT IN AN ORGANIZED HEALTH CARE EDUCATION/TRAINING PROGRAM

## 2024-05-27 PROCEDURE — 99395 PREV VISIT EST AGE 18-39: CPT | Mod: S$GLB,,, | Performed by: STUDENT IN AN ORGANIZED HEALTH CARE EDUCATION/TRAINING PROGRAM

## 2024-05-27 PROCEDURE — 80053 COMPREHEN METABOLIC PANEL: CPT | Performed by: STUDENT IN AN ORGANIZED HEALTH CARE EDUCATION/TRAINING PROGRAM

## 2024-05-27 PROCEDURE — 87491 CHLMYD TRACH DNA AMP PROBE: CPT | Performed by: STUDENT IN AN ORGANIZED HEALTH CARE EDUCATION/TRAINING PROGRAM

## 2024-05-27 NOTE — PROGRESS NOTES
[facility]  Subjective:       Patient ID: Elvia Carr is a 23 y.o. female.  Chief Complaint: Establish care  History was obtained from the patient and supplemented through chart review.    HPI:  Patient is a 23 y.o. female who presents for the above chief complaint.     Takes cymbalta for depression and Adderall for ADHD. These are both managed by her psychiatrist Dr. Israel Inman.  Reports her symptoms are well-controlled on this regimen.    Is in [employer] school currently. She has been writing a lot and learning how to suture. Her right wrist has been hurting. Its an aching pain uysually but at its worst it feels like a stabbing robert at the base of her thumb. This has been going on for about a month.  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  She was currently staying with her parents in [place] for the summer.    Never smoked  Etoh: about 1 drink per week on avearge. No other drugs.   Is from [place]. Has a good relationship with her parrents who live here.   Exercise: tries to walk once per week.     She was interested in STI testing and repeating basic labs includin cholesterol and thyroid studies.    Medical History  Past Medical History:   Diagnosis Date    Anxiety     Asthma     Depression     Dyspareunia     UC (ulcerative colitis) 04/2017         Surgical hx, family hx, social hx   Family History   Problem Relation Name Age of Onset    Osteoarthritis Mother Lexy Reis     Migraines Mother Lexy Reis     Arthritis Mother Lexy Reis     Depression Mother Lexy Reis     Learning disabilities Mother Lexy Reis     Miscarriages / Stillbirths Mother Lexy Reis     Hypertension Father Brooks Carr     Heart failure Maternal Grandfather Brooks Reis     Alcohol abuse Maternal Grandmother Marysol Reis     Breast cancer Neg Hx      Colon cancer Neg Hx      Ovarian cancer Neg Hx       Past Surgical  History:   Procedure Laterality Date    COLONOSCOPY N/A 04/17/2017    Procedure: COLONOSCOPY;  Surgeon: Drake Arnold MD;  Location[facility] ENDO;  Service: Endoscopy;  Laterality: N/A;    COLONOSCOPY N/A 08/23/2019    Procedure: COLONOSCOPY;  Surgeon: Taylor Cook MD;  Location: [facility] ENDO;  Service: Endoscopy;  Laterality: N/A;    COSMETIC SURGERY  03/28/2023    Liposuction    ESOPHAGOGASTRODUODENOSCOPY N/A 11/07/2022    Procedure: EGD (ESOPHAGOGASTRODUODENOSCOPY);  Surgeon: Willie Roque MD;  Location: [facility] ENDO;  Service: Endoscopy;  Laterality: N/A;  instructions sent to [facility]    INSERTION OF CONTRACEPTIVE CAPSULE  05/2016    NONE       Social History     Socioeconomic History    Marital status: Single    Number of children: 0   Occupational History    Occupation: student   Tobacco Use    Smoking status: Some Days     Current packs/day: 0.00     Types: Cigarettes, Vaping with nicotine    Smokeless tobacco: Never   Substance and Sexual Activity    Alcohol use: Yes     Alcohol/week: 1.0 - 2.0 standard drink of alcohol     Types: 1 - 2 Shots of liquor per week    Drug use: No    Sexual activity: Not Currently     Partners: Male     Birth control/protection: OCP     Comment: Jenifer   Social History Narrative    Exercises regularly (3-5 days weekly): resistance/cardio.      Diet is fair/good.      Satisfied with weight.    She does drink at least 1/2 gallon water daily.    She drinks 0 coffee/tea/caffeine-containing soft drinks daily.    Total sleep time at night is 7 hours.    She does wear seat belts.    Hobbies include none.     Social Determinants of Health     Financial Resource Strain: Low Risk  (5/26/2024)    Overall Financial Resource Strain (CARDIA)     Difficulty of Paying Living Expenses: Not hard at all   Food Insecurity: No Food Insecurity (5/26/2024)    Hunger Vital Sign     Worried About Running Out of Food in the Last Year: Never true     Ran Out of Food in the Last  Year: Never true   Transportation Needs: No Transportation Needs (9/14/2023)    [facility] from [facility]    PRAPARE - Transportation     Lack of Transportation (Medical): No     Lack of Transportation (Non-Medical): No   Physical Activity: Insufficiently Active (5/26/2024)    Exercise Vital Sign     Days of Exercise per Week: 1 day     Minutes of Exercise per Session: 30 min   Stress: Stress Concern Present (5/26/2024)    [facility] - Occupational Stress Questionnaire     Feeling of Stress : To some extent   Housing Stability: Unknown (5/26/2024)    Housing Stability Vital Sign     Unable to Pay for Housing in the Last Year: No     Immunization History   Administered Date(s) Administered    COVID-19, vector-nr, rS-Ad26, PF (Zack) 03/14/2021    DTaP 1998, 02/23/1999, 04/13/1999, 03/09/2000    HIB 1998, 01/12/1999    HPV 9-Valent 06/15/2016, 08/02/2016, 12/07/2016    HPV Quadrivalent 08/04/2014    Hepatitis A, Pediatric/Adolescent, 2 Dose 05/31/2010    Hepatitis B 1998, 1998, 07/28/1999    Hib-HbOC 04/13/1999, 12/16/1999    IPV 1998, 01/12/1999, 03/09/2000, 09/18/2002    Influenza 11/07/2007, 10/21/2013, 10/31/2015, 09/03/2018    Influenza - Quadrivalent 10/31/2015, 10/04/2016, 09/27/2019, 10/13/2020    Influenza - Quadrivalent - MDCK - PF 11/03/2022    Influenza - Quadrivalent - PF *Preferred* (6 months and older) 11/07/2007, 10/31/2015, 10/13/2020, 10/16/2021    Influenza - Trivalent (PED) 10/21/2013    Influenza - Trivalent - PF (ADULT) 08/20/2019    Influenza - Trivalent - PF (PED) 11/07/2007, 10/21/2013, 10/31/2015    Influenza Split 10/21/2013    MMR 12/16/1999, 09/18/2002    Meningococcal Conjugate (MCV4P) 07/31/2009, 07/12/2017    PPD Test 09/14/1999, 09/12/2000, 08/28/2001, 11/03/2003     "Rabies 08/16/2023, 08/23/2023    Td (ADULT) 06/17/2022    Tdap 07/31/2009    Typhoid - ViCPs 07/12/2017    Varicella 06/13/2000, 07/31/2009     Current Outpatient Medications   Medication Instructions    dextroamphetamine-amphetamine (ADDERALL XR) 20 MG 24 hr capsule Oral, Every morning    drospirenone-ethinyl estradioL (ANNABELLE) 3-0.03 mg per tablet 1 tablet, Oral, Daily    DULoxetine (CYMBALTA) 60 mg, Oral, Daily    DULoxetine (CYMBALTA) 30 mg, Oral, Daily    guanFACINE (TENEX) 1 mg, Oral, Nightly PRN    omeprazole (PRILOSEC) 40 mg, Oral, 2 times daily before meals     Objective:      Body mass index is 29.46 kg/m².  Vitals:    05/27/24 0834   BP: 130/84   Pulse: 106   SpO2: 98%   Weight: 80.3 kg (177 lb 0.5 oz)   Height: 5' 5" (1.651 m)   PainSc:   3   PainLoc: Wrist     Physical Exam  Constitutional:       Appearance: Normal appearance.   Cardiovascular:      Rate and Rhythm: Normal rate.      Pulses: Normal pulses.      Heart sounds: No murmur heard.  Pulmonary:      Effort: Pulmonary effort is normal. No respiratory distress.   Abdominal:      General: Abdomen is flat.   Musculoskeletal:      Right hand: Bony tenderness present.      Right lower leg: No edema.      Left lower leg: No edema.      Comments: Radial styloid tenderness   Skin:     General: Skin is warm.   Neurological:      Mental Status: She is alert.           Lab Results   Component Value Date    WBC 7.72 05/27/2024    HGB 14.2 05/27/2024    HCT 43.9 05/27/2024     05/27/2024    CHOL 132 06/15/2018    TRIG 59 06/15/2018    HDL 49 06/15/2018    ALT 22 01/03/2019    AST 20 01/03/2019     01/03/2019    K 4.0 01/03/2019     01/03/2019    CREATININE 0.7 01/03/2019    BUN 10 01/03/2019    CO2 24 01/03/2019    TSH 1.537 06/15/2018    INR 1.0 03/28/2017       The ASCVD Risk score (Arelis MALIK, et al., 2019) failed to calculate for the following reasons:    The 2019 ASCVD risk score is only valid for ages 40 to 79  Assessment:       1. De " Quervain's disease (radial styloid tenosynovitis)    2. Screening examination for STI    3. Annual physical exam          Plan:       De Quervain's disease (radial styloid tenosynovitis)  -     Ambulatory referral/consult to Hand Surgery; Future; Expected date: 06/03/2024    Screening examination for STI  -     HIV 1/2 Ag/Ab (4th Gen); Future; Expected date: 05/27/2024  -     Treponema Pallidium Antibodies IgG, IgM; Future; Expected date: 05/27/2024  -     Cancel: C. trachomatis/N. gonorrhoeae by AMP DNA [facility]; Urine  -     C. trachomatis/N. gonorrhoeae by AMP DNA; Future; Expected date: 05/27/2024    Annual physical exam  -     LIPID PANEL; Future; Expected date: 05/27/2024  -     TSH; Future; Expected date: 05/27/2024  -     COMPREHENSIVE METABOLIC PANEL; Future; Expected date: 05/27/2024  -     CBC W/ AUTO DIFFERENTIAL; Future; Expected date: 05/27/2024        Health Maintenance    All of your core healthy metrics are met.    Follow up in about 1 year (around 5/27/2025). or sooner prn          Randall Martinez  [facility]  [address] [address]  Phone [phone]  Fax [phone]    This note is dictated using the M*Modal Fluency Direct word recognition program. It may contain word recognition mistakes or wrong word substitutions (commonly he/she and is/was substitutions) that were missed on review.

## 2024-05-28 ENCOUNTER — TELEPHONE (OUTPATIENT)
Dept: ORTHOPEDICS | Facility: CLINIC | Age: 24
End: 2024-05-28
Payer: COMMERCIAL

## 2024-05-28 LAB
C TRACH DNA SPEC QL NAA+PROBE: NOT DETECTED
N GONORRHOEA DNA SPEC QL NAA+PROBE: NOT DETECTED

## 2024-05-29 ENCOUNTER — TELEPHONE (OUTPATIENT)
Dept: ORTHOPEDICS | Facility: CLINIC | Age: 24
End: 2024-05-29
Payer: COMMERCIAL

## 2024-06-24 DIAGNOSIS — Z71.1 CONCERN ABOUT STD IN FEMALE WITHOUT DIAGNOSIS: ICD-10-CM

## 2024-06-24 DIAGNOSIS — Z01.419 WELL WOMAN EXAM WITH ROUTINE GYNECOLOGICAL EXAM: ICD-10-CM

## 2024-06-24 DIAGNOSIS — Z30.41 SURVEILLANCE OF PREVIOUSLY PRESCRIBED CONTRACEPTIVE PILL: ICD-10-CM

## 2024-06-24 NOTE — TELEPHONE ENCOUNTER
Refill Routing Note   Medication(s) are not appropriate for processing by [facility] for the following reason(s):        Outside of protocol( active smoker)    [facility] action(s):  Route        Medication Therapy Plan: Active Smoker      Appointments  past 12m or future 3m with PCP    Date Provider   Last Visit   1/3/2024 Gaston James III, MD   Next Visit   Visit date not found Gaston James III, MD   ED visits in past 90 days: 0        Note composed:2:48 PM 06/24/2024

## 2024-06-26 RX ORDER — DROSPIRENONE AND ETHINYL ESTRADIOL 0.03MG-3MG
1 KIT ORAL DAILY
Qty: 84 TABLET | Refills: 1 | Status: SHIPPED | OUTPATIENT
Start: 2024-06-26

## 2024-12-03 DIAGNOSIS — Z01.419 WELL WOMAN EXAM WITH ROUTINE GYNECOLOGICAL EXAM: ICD-10-CM

## 2024-12-03 DIAGNOSIS — Z30.41 SURVEILLANCE OF PREVIOUSLY PRESCRIBED CONTRACEPTIVE PILL: ICD-10-CM

## 2024-12-03 DIAGNOSIS — Z71.1 CONCERN ABOUT STD IN FEMALE WITHOUT DIAGNOSIS: ICD-10-CM

## 2024-12-04 RX ORDER — DROSPIRENONE AND ETHINYL ESTRADIOL 0.03MG-3MG
1 KIT ORAL
Qty: 84 TABLET | Refills: 1 | OUTPATIENT
Start: 2024-12-04

## 2024-12-04 RX ORDER — DROSPIRENONE AND ETHINYL ESTRADIOL 0.03MG-3MG
1 KIT ORAL
Qty: 84 TABLET | Refills: 1 | Status: SHIPPED | OUTPATIENT
Start: 2024-12-04

## 2024-12-04 NOTE — TELEPHONE ENCOUNTER
Refill Routing Note   Medication(s) are not appropriate for processing by [facility] for the following reason(s):      Failed Patient is not an active smoker    [facility] action(s):  Quick Discontinue Care Due:  None identified     Medication Therapy Plan: Failed Patient is not an active smoker      Appointments  past 12m or future 3m with PCP    Date Provider   Last Visit   1/3/2024 Gaston James III, MD   Next Visit   Visit date not found Gaston James III, MD   ED visits in past 90 days: 0        Note composed:10:54 AM 12/04/2024

## 2024-12-04 NOTE — TELEPHONE ENCOUNTER
Refill Decision Note   Elvia Carr  is requesting a refill authorization.  Brief Assessment and Rationale for Refill:  Quick Discontinue     Medication Therapy Plan:       Medication Reconciliation Completed: No   Comments:     No Care Gaps recommended.     Note composed:10:53 AM 12/04/2024

## 2025-01-02 ENCOUNTER — LAB VISIT (OUTPATIENT)
Dept: LAB | Facility: OTHER | Age: 25
End: 2025-01-02
Attending: OBSTETRICS & GYNECOLOGY
Payer: COMMERCIAL

## 2025-01-02 ENCOUNTER — OFFICE VISIT (OUTPATIENT)
Dept: OBSTETRICS AND GYNECOLOGY | Facility: CLINIC | Age: 25
End: 2025-01-02
Payer: COMMERCIAL

## 2025-01-02 VITALS — BODY MASS INDEX: 29.46 KG/M2 | DIASTOLIC BLOOD PRESSURE: 86 MMHG | HEIGHT: 65 IN | SYSTOLIC BLOOD PRESSURE: 119 MMHG

## 2025-01-02 DIAGNOSIS — Z11.3 SCREENING FOR STDS (SEXUALLY TRANSMITTED DISEASES): ICD-10-CM

## 2025-01-02 DIAGNOSIS — Z12.4 SCREENING FOR CERVICAL CANCER: ICD-10-CM

## 2025-01-02 DIAGNOSIS — Z01.419 WELL WOMAN EXAM WITH ROUTINE GYNECOLOGICAL EXAM: Primary | ICD-10-CM

## 2025-01-02 LAB
HIV 1+2 AB+HIV1 P24 AG SERPL QL IA: NEGATIVE
TREPONEMA PALLIDUM IGG+IGM AB [PRESENCE] IN SERUM OR PLASMA BY IMMUNOASSAY: NONREACTIVE

## 2025-01-02 PROCEDURE — 86593 SYPHILIS TEST NON-TREP QUANT: CPT | Performed by: OBSTETRICS & GYNECOLOGY

## 2025-01-02 PROCEDURE — 87491 CHLMYD TRACH DNA AMP PROBE: CPT | Performed by: OBSTETRICS & GYNECOLOGY

## 2025-01-02 PROCEDURE — 88175 CYTOPATH C/V AUTO FLUID REDO: CPT | Performed by: OBSTETRICS & GYNECOLOGY

## 2025-01-02 PROCEDURE — 87389 HIV-1 AG W/HIV-1&-2 AB AG IA: CPT | Performed by: OBSTETRICS & GYNECOLOGY

## 2025-01-02 PROCEDURE — 99999 PR PBB SHADOW E&M-EST. PATIENT-LVL III: CPT | Mod: PBBFAC,,, | Performed by: OBSTETRICS & GYNECOLOGY

## 2025-01-02 PROCEDURE — 36415 COLL VENOUS BLD VENIPUNCTURE: CPT | Performed by: OBSTETRICS & GYNECOLOGY

## 2025-01-02 NOTE — PROGRESS NOTES
Subjective     Patient ID: Elvia Carr is a 24 y.o. female.    Chief Complaint:  Well Woman and Contraception      History of Present Illness  24 y.o.  presents for annual exam.  Overall doing well.  Cycles regular on OCPs.  Remembers to take daily.  Sexually active without complaints.  Has had gardasil vaccine.  Nonsmoker.  No other complaints today.     Gynecologic Exam  The patient's pertinent negatives include no genital itching, genital lesions, genital odor, genital rash, missed menses, pelvic pain, vaginal bleeding or vaginal discharge. Associated symptoms include constipation (IBS/UC). Pertinent negatives include no abdominal pain, anorexia, back pain, chills, diarrhea, discolored urine, dysuria, fever, flank pain, frequency, headaches, hematuria, nausea, painful intercourse, rash, urgency or vomiting. The vaginal discharge was normal. She is sexually active. No, her partner does not have an STD. She uses oral contraceptives for contraception. Her menstrual history has been regular. Her past medical history is significant for menorrhagia and vaginosis. There is no history of an abdominal surgery, a  section, an ectopic pregnancy, endometriosis, a gynecological surgery, herpes simplex, metrorrhagia, miscarriage, ovarian cysts, perineal abscess, PID, an STD or a terminated pregnancy.         GYN & OB History  Patient's last menstrual period was 2024 (approximate).   Date of Last Pap: 2021    OB History    Para Term  AB Living   0 0 0 0 0 0   SAB IAB Ectopic Multiple Live Births   0 0 0 0     Obstetric Comments   Menarche age 9.  Menses normal and regular with 28-30 cycles, and 3-4 days of normal flow.       Review of Systems  Review of Systems   Constitutional:  Negative for chills and fever.   Respiratory:  Negative for shortness of breath.    Cardiovascular:  Negative for chest pain.   Gastrointestinal:  Positive for constipation (IBS/UC). Negative for  "abdominal pain, anorexia, diarrhea, nausea and vomiting.   Genitourinary:  Positive for menorrhagia. Negative for dysuria, flank pain, frequency, hematuria, missed menses, pelvic pain, urgency and vaginal discharge.   Musculoskeletal:  Negative for back pain.   Integumentary:  Negative for rash.   Neurological:  Negative for headaches.          Objective   Physical Exam    /86   Ht 5' 5" (1.651 m)   LMP 12/05/2024 (Approximate)   BMI 29.46 kg/m²     Gen: NAD  Resp: Normal respiratory effort  Breast: Symmetric, nontender.  No masses.  No skin changes.  No nipple discharge.   Abd: soft, NT  Pelvic: Normal-appearing external female genitalia.  No CMT.  Uterus small, mobile, nontender.  No adnexal masses or tenderness.    Ext: normal ROM  Psych: appropriate affect  Neuro: grossly intact         Assessment and Plan     Elvia was seen today for well woman and contraception.    Diagnoses and all orders for this visit:    Well woman exam with routine gynecological exam    Screening for cervical cancer  -     Liquid-Based Pap Smear, Screening    Screening for STDs (sexually transmitted diseases)  -     HIV 1/2 Ag/Ab (4th Gen); Future  -     Treponema Pallidium Antibodies IgG, IgM (Age >= 28 days); Future  -     C. trachomatis/N. gonorrhoeae by AMP DNA [facility]; Cervix          Plan:  Routine annual exam with pap smear and breast exam today.  STD screening.  Doing well on OCPs, will let me know when she needs refill.   Counseling done, precautions given, all questions answered.  RTC 1 year for annual, or prn.            "

## 2025-01-03 LAB
C TRACH DNA SPEC QL NAA+PROBE: NOT DETECTED
CLINICAL INFO: NORMAL
DATE OF PREVIOUS PAP: NORMAL
DATE PREVIOUS BX: NO
LMP START DATE: NORMAL
N GONORRHOEA DNA SPEC QL NAA+PROBE: NOT DETECTED
SPECIMEN SOURCE CVX/VAG CYTO: NORMAL

## 2025-02-26 ENCOUNTER — PATIENT MESSAGE (OUTPATIENT)
Dept: OBSTETRICS AND GYNECOLOGY | Facility: CLINIC | Age: 25
End: 2025-02-26

## 2025-02-26 DIAGNOSIS — Z01.419 WELL WOMAN EXAM WITH ROUTINE GYNECOLOGICAL EXAM: ICD-10-CM

## 2025-02-26 DIAGNOSIS — Z30.41 SURVEILLANCE OF PREVIOUSLY PRESCRIBED CONTRACEPTIVE PILL: ICD-10-CM

## 2025-02-26 DIAGNOSIS — Z71.1 CONCERN ABOUT STD IN FEMALE WITHOUT DIAGNOSIS: ICD-10-CM

## 2025-02-26 RX ORDER — DROSPIRENONE AND ETHINYL ESTRADIOL 0.03MG-3MG
1 KIT ORAL DAILY
Qty: 84 TABLET | Refills: 4 | Status: SHIPPED | OUTPATIENT
Start: 2025-02-26

## 2025-02-26 RX ORDER — DROSPIRENONE AND ETHINYL ESTRADIOL 0.03MG-3MG
1 KIT ORAL DAILY
Qty: 84 TABLET | Refills: 4 | Status: SHIPPED | OUTPATIENT
Start: 2025-02-26 | End: 2025-02-26

## 2025-05-13 ENCOUNTER — LAB VISIT (OUTPATIENT)
Dept: LAB | Facility: OTHER | Age: 25
End: 2025-05-13
Attending: STUDENT IN AN ORGANIZED HEALTH CARE EDUCATION/TRAINING PROGRAM
Payer: COMMERCIAL

## 2025-05-13 ENCOUNTER — OFFICE VISIT (OUTPATIENT)
Dept: INTERNAL MEDICINE | Facility: CLINIC | Age: 25
End: 2025-05-13
Payer: COMMERCIAL

## 2025-05-13 VITALS
WEIGHT: 150.13 LBS | HEIGHT: 65 IN | BODY MASS INDEX: 25.01 KG/M2 | SYSTOLIC BLOOD PRESSURE: 126 MMHG | OXYGEN SATURATION: 100 % | HEART RATE: 98 BPM | DIASTOLIC BLOOD PRESSURE: 83 MMHG

## 2025-05-13 DIAGNOSIS — H91.90 HEARING LOSS, UNSPECIFIED HEARING LOSS TYPE, UNSPECIFIED LATERALITY: ICD-10-CM

## 2025-05-13 DIAGNOSIS — Z00.00 ANNUAL PHYSICAL EXAM: Primary | ICD-10-CM

## 2025-05-13 DIAGNOSIS — Z00.00 ANNUAL PHYSICAL EXAM: ICD-10-CM

## 2025-05-13 LAB
ABSOLUTE EOSINOPHIL (OHS): 0.18 K/UL
ABSOLUTE MONOCYTE (OHS): 0.39 K/UL (ref 0.3–1)
ABSOLUTE NEUTROPHIL COUNT (OHS): 3.55 K/UL (ref 1.8–7.7)
ALBUMIN SERPL BCP-MCNC: 4.2 G/DL (ref 3.5–5.2)
ALP SERPL-CCNC: 32 UNIT/L (ref 40–150)
ALT SERPL W/O P-5'-P-CCNC: 27 UNIT/L (ref 10–44)
ANION GAP (OHS): 11 MMOL/L (ref 8–16)
AST SERPL-CCNC: 24 UNIT/L (ref 11–45)
BASOPHILS # BLD AUTO: 0.02 K/UL
BASOPHILS NFR BLD AUTO: 0.3 %
BILIRUB SERPL-MCNC: 0.4 MG/DL (ref 0.1–1)
BUN SERPL-MCNC: 8 MG/DL (ref 6–20)
CALCIUM SERPL-MCNC: 9.6 MG/DL (ref 8.7–10.5)
CHLORIDE SERPL-SCNC: 111 MMOL/L (ref 95–110)
CHOLEST SERPL-MCNC: 171 MG/DL (ref 120–199)
CHOLEST/HDLC SERPL: 2.8 {RATIO} (ref 2–5)
CO2 SERPL-SCNC: 18 MMOL/L (ref 23–29)
CREAT SERPL-MCNC: 0.7 MG/DL (ref 0.5–1.4)
EAG (OHS): 77 MG/DL (ref 68–131)
ERYTHROCYTE [DISTWIDTH] IN BLOOD BY AUTOMATED COUNT: 12.3 % (ref 11.5–14.5)
GFR SERPLBLD CREATININE-BSD FMLA CKD-EPI: >60 ML/MIN/1.73/M2
GLUCOSE SERPL-MCNC: 80 MG/DL (ref 70–110)
HBA1C MFR BLD: 4.3 % (ref 4–5.6)
HCT VFR BLD AUTO: 43.1 % (ref 37–48.5)
HDLC SERPL-MCNC: 61 MG/DL (ref 40–75)
HDLC SERPL: 35.7 % (ref 20–50)
HGB BLD-MCNC: 14 GM/DL (ref 12–16)
IMM GRANULOCYTES # BLD AUTO: 0.01 K/UL (ref 0–0.04)
IMM GRANULOCYTES NFR BLD AUTO: 0.1 % (ref 0–0.5)
LDLC SERPL CALC-MCNC: 89.4 MG/DL (ref 63–159)
LYMPHOCYTES # BLD AUTO: 2.66 K/UL (ref 1–4.8)
MCH RBC QN AUTO: 28.9 PG (ref 27–31)
MCHC RBC AUTO-ENTMCNC: 32.5 G/DL (ref 32–36)
MCV RBC AUTO: 89 FL (ref 82–98)
NONHDLC SERPL-MCNC: 110 MG/DL
NUCLEATED RBC (/100WBC) (OHS): 0 /100 WBC
PLATELET # BLD AUTO: 229 K/UL (ref 150–450)
PMV BLD AUTO: 9.8 FL (ref 9.2–12.9)
POTASSIUM SERPL-SCNC: 4.7 MMOL/L (ref 3.5–5.1)
PROT SERPL-MCNC: 7.4 GM/DL (ref 6–8.4)
RBC # BLD AUTO: 4.85 M/UL (ref 4–5.4)
RELATIVE EOSINOPHIL (OHS): 2.6 %
RELATIVE LYMPHOCYTE (OHS): 39.1 % (ref 18–48)
RELATIVE MONOCYTE (OHS): 5.7 % (ref 4–15)
RELATIVE NEUTROPHIL (OHS): 52.2 % (ref 38–73)
SODIUM SERPL-SCNC: 140 MMOL/L (ref 136–145)
TRIGL SERPL-MCNC: 103 MG/DL (ref 30–150)
TSH SERPL-ACNC: 0.76 UIU/ML (ref 0.4–4)
WBC # BLD AUTO: 6.81 K/UL (ref 3.9–12.7)

## 2025-05-13 PROCEDURE — 3044F HG A1C LEVEL LT 7.0%: CPT | Mod: CPTII,S$GLB,, | Performed by: STUDENT IN AN ORGANIZED HEALTH CARE EDUCATION/TRAINING PROGRAM

## 2025-05-13 PROCEDURE — 85025 COMPLETE CBC W/AUTO DIFF WBC: CPT

## 2025-05-13 PROCEDURE — 84075 ASSAY ALKALINE PHOSPHATASE: CPT

## 2025-05-13 PROCEDURE — 80061 LIPID PANEL: CPT

## 2025-05-13 PROCEDURE — 3079F DIAST BP 80-89 MM HG: CPT | Mod: CPTII,S$GLB,, | Performed by: STUDENT IN AN ORGANIZED HEALTH CARE EDUCATION/TRAINING PROGRAM

## 2025-05-13 PROCEDURE — 36415 COLL VENOUS BLD VENIPUNCTURE: CPT

## 2025-05-13 PROCEDURE — 99395 PREV VISIT EST AGE 18-39: CPT | Mod: S$GLB,,, | Performed by: STUDENT IN AN ORGANIZED HEALTH CARE EDUCATION/TRAINING PROGRAM

## 2025-05-13 PROCEDURE — 3074F SYST BP LT 130 MM HG: CPT | Mod: CPTII,S$GLB,, | Performed by: STUDENT IN AN ORGANIZED HEALTH CARE EDUCATION/TRAINING PROGRAM

## 2025-05-13 PROCEDURE — 84443 ASSAY THYROID STIM HORMONE: CPT

## 2025-05-13 PROCEDURE — 3008F BODY MASS INDEX DOCD: CPT | Mod: CPTII,S$GLB,, | Performed by: STUDENT IN AN ORGANIZED HEALTH CARE EDUCATION/TRAINING PROGRAM

## 2025-05-13 PROCEDURE — 83036 HEMOGLOBIN GLYCOSYLATED A1C: CPT

## 2025-05-13 PROCEDURE — 99999 PR PBB SHADOW E&M-EST. PATIENT-LVL III: CPT | Mod: PBBFAC,,, | Performed by: STUDENT IN AN ORGANIZED HEALTH CARE EDUCATION/TRAINING PROGRAM

## 2025-05-13 NOTE — PROGRESS NOTES
"[facility]    Subjective:    Patient ID: Elvia Carr is a 24 y.o. female.    History of Present Illness    CHIEF COMPLAINT:  Patient presents today for annual exam    HEARING CONCERNS:  She reports progressive hearing difficulties, including needing to increase volume and frequently asking for repetition in various settings including class. These issues are not limited to noisy environments. Family history includes hearing loss in mother (potentially related to rock band exposure in 1980s) and grandfather who used hearing aids.    WEIGHT MANAGEMENT:  She reports 25-pound weight loss and expresses satisfaction with current weight. She plans to begin tapering weight management efforts. She continues tirzepatide for weight management.    PSYCHIATRIC:  She is currently weaning off Cymbalta under psychiatrist supervision.    GASTROINTESTINAL:  She was diagnosed with acute colitis in 2017. Follow-up evaluation in 2019 confirmed no chronic changes, and she reports minimal symptoms since diagnosis.    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 She reports occasional alcohol use.      Current Outpatient Medications   Medication Instructions    dextroamphetamine-amphetamine (ADDERALL XR) 20 MG 24 hr capsule Every morning    drospirenone-ethinyl estradioL (ANNABELLE) 3-0.03 mg per tablet 1 tablet, Oral, Daily    DULoxetine (CYMBALTA) 30 mg, Daily    tirzepatide 12.5 mg/0.5 mL PnIj        Objective:      Body mass index is 24.98 kg/m².  Vitals:    05/13/25 1142   BP: 126/83   Pulse: 98   SpO2: 100%   Weight: 68.1 kg (150 lb 2.1 oz)   Height: 5' 5" (1.651 m)   PainSc: 0-No pain     Physical Exam   Physical Exam    General: No acute distress. Normal appearance.  Head: Normocephalic.  Eyes: Extraocular movements intact.  Neck: Thyroid enlargement.  Cardiovascular: Normal rate and rhythm. No murmur heard.  Lungs: Pulmonary effort " is normal. Normal breath sounds. No wheezing. No rales.  Abdomen: Flat.  Musculoskeletal: No edema lower extremities.  Skin: Warm. Dry.  Neurological: Alert.  Psychiatric: Normal mood. Normal behavior.        Assessment:      1. Annual physical exam    2. Hearing loss, unspecified hearing loss type, unspecified laterality             Plan (dictated):   Presents today for annual exam. She's doing well without significant complaints.   She's currently taking Duloxetine, and is working on weaning off of this with her psychiatrist. She is also on tirzepatide, and is in the process of weaning off of this as well. She's interested in annual labs. She does report difficulty hearing conversation and needing to turn the volume in her speakers and headphones up. Will refer to audiology for hearing exam. Otherwise doing well. Follow-up annually or sooner as needed.         Diagnoses and associated orders:   Annual physical exam  -     Comprehensive Metabolic Panel; Future; Expected date: 05/13/2025  -     CBC Auto Differential; Future; Expected date: 05/13/2025  -     Lipid Panel; Future; Expected date: 05/13/2025  -     TSH; Future; Expected date: 05/13/2025  -     Hemoglobin A1C; Future; Expected date: 05/13/2025    Hearing loss, unspecified hearing loss type, unspecified laterality  -     Ambulatory referral/consult to Audiology; Future; Expected date: 05/20/2025        Tests to Keep You Healthy    Cervical Cancer Screening: Met on 1/2/2025  Tobacco Cessation: NO    No follow-ups on file. or sooner prn (as needed)          Randall Cesar Bank [facility]  [address] [address]  Phone [phone]  Fax [phone]    Part of this note is dictated using the M*Kuli Kuli Fluency Direct word recognition program. It may contain word recognition mistakes or wrong word substitutions (commonly he/she and is/was substitutions) that were missed on review.    Part of this note was  generated with the assistance of ambient listening technology. Verbal consent was obtained by the patient and accompanying visitor(s) for the recording of patient appointment to facilitate this note. I attest to having reviewed and edited the generated note for accuracy, though some syntax or spelling errors may persist. Please contact the author of this note for any clarification.

## 2025-05-14 ENCOUNTER — CLINICAL SUPPORT (OUTPATIENT)
Dept: AUDIOLOGY | Facility: CLINIC | Age: 25
End: 2025-05-14
Payer: COMMERCIAL

## 2025-05-14 ENCOUNTER — RESULTS FOLLOW-UP (OUTPATIENT)
Dept: INTERNAL MEDICINE | Facility: CLINIC | Age: 25
End: 2025-05-14

## 2025-05-14 DIAGNOSIS — H93.299 ABNORMAL AUDITORY PERCEPTION, UNSPECIFIED LATERALITY: ICD-10-CM

## 2025-05-14 DIAGNOSIS — R79.9 BLOOD CHEMISTRY ABNORMALITY: Primary | ICD-10-CM

## 2025-05-14 PROCEDURE — 92567 TYMPANOMETRY: CPT | Mod: S$GLB,,,

## 2025-05-14 PROCEDURE — 92557 COMPREHENSIVE HEARING TEST: CPT | Mod: S$GLB,,,

## 2025-05-14 PROCEDURE — 99999 PR PBB SHADOW E&M-EST. PATIENT-LVL II: CPT | Mod: PBBFAC,,,

## 2025-05-14 NOTE — PROGRESS NOTES
History:  Elvia Carr, a 24 y.o. female, was seen today for an audiologic evaluation. 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 She finds environments with even low-level noise, such as class breaks with quiet chatter, are difficult listening environments. She noted some concerns for attention since college, though she has not felt she needed medical management. Patient noted occasional, brief, non-intrusive, buzzing tinnitus, which she thinks is primarily in her right ear. Patient reported family history of hearing loss (mother with HL in her 40s and history of noise exposure, and grandfather with hearing loss around his 70s). Patient denied otalgia, aural pressure, and dizziness.    Results:  Tympanometry revealed Type A tympanogram in the right ear and Type A tympanogram in the left ear.   Pure tone audiometry revealed normal hearing sensitivity, bilaterally.  Speech reception thresholds were obtained at 10 dB HL in the right ear and 10 dB HL in the left ear.  Word recognition scores were 100% in the right ear and 100% in the left ear.  The QuickSIN test was administered in sound field to evaluate hearing perception in the presence of background noise. The results of the QuickSIN revealed an SNR loss of 0.5 dBHL indicating no significant impairment in the presence of noise, consistent with normal hearing sensitivity.      Discussion of Results:  Hearing sensitivity in both ears is adequate for continued communication at this time. Patient may benefit from closer proximity to speakers, increased visual cues, and elimination/reduction of background noise when possible.     Recommendations:  Use hearing protection when in noise.  Recommend communication strategies as discussed. Patient was given an informational handout with communication strategies to review with frequent communication partners as needed.  If difficulties  persist with use of communication strategies, patient may consider discussing concerns re: attention with primary doctor and/or consider auditory processing evaluation through [facility].  Return for follow-up audiologic evaluation if changes in hearing or tinnitus are perceived.

## 2025-05-30 ENCOUNTER — LAB VISIT (OUTPATIENT)
Dept: LAB | Facility: HOSPITAL | Age: 25
End: 2025-05-30
Attending: STUDENT IN AN ORGANIZED HEALTH CARE EDUCATION/TRAINING PROGRAM
Payer: COMMERCIAL

## 2025-05-30 DIAGNOSIS — R79.9 BLOOD CHEMISTRY ABNORMALITY: ICD-10-CM

## 2025-05-30 LAB
ANION GAP (OHS): 11 MMOL/L (ref 8–16)
BUN SERPL-MCNC: 7 MG/DL (ref 6–20)
CALCIUM SERPL-MCNC: 9.6 MG/DL (ref 8.7–10.5)
CHLORIDE SERPL-SCNC: 107 MMOL/L (ref 95–110)
CO2 SERPL-SCNC: 24 MMOL/L (ref 23–29)
CREAT SERPL-MCNC: 0.6 MG/DL (ref 0.5–1.4)
GFR SERPLBLD CREATININE-BSD FMLA CKD-EPI: >60 ML/MIN/1.73/M2
GLUCOSE SERPL-MCNC: 71 MG/DL (ref 70–110)
POTASSIUM SERPL-SCNC: 3.9 MMOL/L (ref 3.5–5.1)
SODIUM SERPL-SCNC: 142 MMOL/L (ref 136–145)

## 2025-05-30 PROCEDURE — 36415 COLL VENOUS BLD VENIPUNCTURE: CPT | Mod: PN

## 2025-05-30 PROCEDURE — 80048 BASIC METABOLIC PNL TOTAL CA: CPT

## 2025-06-04 ENCOUNTER — OFFICE VISIT (OUTPATIENT)
Dept: OPTOMETRY | Facility: CLINIC | Age: 25
End: 2025-06-04
Payer: COMMERCIAL

## 2025-06-04 DIAGNOSIS — H04.123 DRY EYE SYNDROME OF BOTH EYES: Primary | ICD-10-CM

## 2025-06-04 DIAGNOSIS — H52.13 MYOPIA WITH ASTIGMATISM AND PRESBYOPIA, BILATERAL: ICD-10-CM

## 2025-06-04 DIAGNOSIS — H52.203 MYOPIA WITH ASTIGMATISM AND PRESBYOPIA, BILATERAL: ICD-10-CM

## 2025-06-04 DIAGNOSIS — H52.4 MYOPIA WITH ASTIGMATISM AND PRESBYOPIA, BILATERAL: ICD-10-CM

## 2025-06-04 PROCEDURE — 92015 DETERMINE REFRACTIVE STATE: CPT | Mod: S$GLB,,, | Performed by: OPTOMETRIST

## 2025-06-04 PROCEDURE — 99999 PR PBB SHADOW E&M-EST. PATIENT-LVL III: CPT | Mod: PBBFAC,,, | Performed by: OPTOMETRIST

## 2025-06-04 PROCEDURE — 1160F RVW MEDS BY RX/DR IN RCRD: CPT | Mod: CPTII,S$GLB,, | Performed by: OPTOMETRIST

## 2025-06-04 PROCEDURE — 92004 COMPRE OPH EXAM NEW PT 1/>: CPT | Mod: S$GLB,,, | Performed by: OPTOMETRIST

## 2025-06-04 PROCEDURE — 1159F MED LIST DOCD IN RCRD: CPT | Mod: CPTII,S$GLB,, | Performed by: OPTOMETRIST

## 2025-06-04 PROCEDURE — 3044F HG A1C LEVEL LT 7.0%: CPT | Mod: CPTII,S$GLB,, | Performed by: OPTOMETRIST

## 2025-06-19 ENCOUNTER — OFFICE VISIT (OUTPATIENT)
Dept: DERMATOLOGY | Facility: CLINIC | Age: 25
End: 2025-06-19
Payer: COMMERCIAL

## 2025-06-19 DIAGNOSIS — L23.9 ALLERGIC DERMATITIS: Primary | ICD-10-CM

## 2025-06-19 PROCEDURE — G2211 COMPLEX E/M VISIT ADD ON: HCPCS | Mod: S$GLB,,, | Performed by: STUDENT IN AN ORGANIZED HEALTH CARE EDUCATION/TRAINING PROGRAM

## 2025-06-19 PROCEDURE — 99214 OFFICE O/P EST MOD 30 MIN: CPT | Mod: S$GLB,,, | Performed by: STUDENT IN AN ORGANIZED HEALTH CARE EDUCATION/TRAINING PROGRAM

## 2025-06-19 PROCEDURE — 3044F HG A1C LEVEL LT 7.0%: CPT | Mod: CPTII,S$GLB,, | Performed by: STUDENT IN AN ORGANIZED HEALTH CARE EDUCATION/TRAINING PROGRAM

## 2025-06-19 PROCEDURE — 1159F MED LIST DOCD IN RCRD: CPT | Mod: CPTII,S$GLB,, | Performed by: STUDENT IN AN ORGANIZED HEALTH CARE EDUCATION/TRAINING PROGRAM

## 2025-06-19 PROCEDURE — 1160F RVW MEDS BY RX/DR IN RCRD: CPT | Mod: CPTII,S$GLB,, | Performed by: STUDENT IN AN ORGANIZED HEALTH CARE EDUCATION/TRAINING PROGRAM

## 2025-06-19 PROCEDURE — 99999 PR PBB SHADOW E&M-EST. PATIENT-LVL III: CPT | Mod: PBBFAC,,, | Performed by: STUDENT IN AN ORGANIZED HEALTH CARE EDUCATION/TRAINING PROGRAM

## 2025-06-19 RX ORDER — TRIAMCINOLONE ACETONIDE 1 MG/G
CREAM TOPICAL
Qty: 454 G | Refills: 0 | Status: SHIPPED | OUTPATIENT
Start: 2025-06-19

## 2025-06-19 RX ORDER — DESONIDE 0.5 MG/G
CREAM TOPICAL
COMMUNITY
Start: 2025-06-04

## 2025-06-19 RX ORDER — PREDNISONE 20 MG/1
TABLET ORAL
Qty: 42 TABLET | Refills: 0 | Status: SHIPPED | OUTPATIENT
Start: 2025-06-19

## 2025-06-19 NOTE — PROGRESS NOTES
Subjective:       Patient ID:  Elvia Carr is a 24 y.o. female who presents for   Chief Complaint   Patient presents with    Itching     C/o of itching all over the body. Patient states she is using Zyrtec and other otc medications but no relief.     History of Present Illness: The patient presents with chief complaint of persistent rash.  Location: all over the body  Duration: developed a couple of weeks ago  Signs/Symptoms: reports having a sudden onset of red itchy hives, rashes and even had swelling of the left side of the face  Prior treatments: went to urgent care, given IM steroids and steroid dose pack, which helped initially, but now symptoms are returning. Denies any rcent contact/exposures, new medications, etc prior to onset of symptoms.       Itching        Review of Systems   Constitutional:  Negative for fever and chills.   Skin:  Positive for itching and rash.        Objective:    Physical Exam   Constitutional: She appears well-developed and well-nourished. No distress.   Neurological: She is alert and oriented to person, place, and time. She is not disoriented.   Psychiatric: She has a normal mood and affect.   Skin:   Areas Examined (abnormalities noted in diagram):   Head / Face Inspection Performed  Neck Inspection Performed  RUE Inspected  LUE Inspection Performed  RLE Inspected  LLE Inspection Performed              Diagram Legend     Erythematous scaling macule/papule c/w actinic keratosis       Vascular papule c/w angioma      Pigmented verrucoid papule/plaque c/w seborrheic keratosis      Yellow umbilicated papule c/w sebaceous hyperplasia      Irregularly shaped tan macule c/w lentigo     1-2 mm smooth white papules consistent with Milia      Movable subcutaneous cyst with punctum c/w epidermal inclusion cyst      Subcutaneous movable cyst c/w pilar cyst      Firm pink to brown papule c/w dermatofibroma      Pedunculated fleshy papule(s) c/w skin tag(s)      Evenly  pigmented macule c/w junctional nevus     Mildly variegated pigmented, slightly irregular-bordered macule c/w mildly atypical nevus      Flesh colored to evenly pigmented papule c/w intradermal nevus       Pink pearly papule/plaque c/w basal cell carcinoma      Erythematous hyperkeratotic cursted plaque c/w SCC      Surgical scar with no sign of skin cancer recurrence      Open and closed comedones      Inflammatory papules and pustules      Verrucoid papule consistent consistent with wart     Erythematous eczematous patches and plaques     Dystrophic onycholytic nail with subungual debris c/w onychomycosis     Umbilicated papule    Erythematous-base heme-crusted tan verrucoid plaque consistent with inflamed seborrheic keratosis     Erythematous Silvery Scaling Plaque c/w Psoriasis     See annotation      Assessment / Plan:        Allergic dermatitis  -     predniSONE (DELTASONE) 20 MG tablet; Take 3 pills po qam with breakfast x 1 wk then take 2 po qam with breakfast x 1 wk then take 1 po qam with breakfast x 1 wk  Dispense: 42 tablet; Refill: 0  -     triamcinolone acetonide 0.1% (KENALOG) 0.1 % cream; AAA bid  Dispense: 454 g; Refill: 0  -      Recommend to continue daily antihistamines.          Follow up in about 6 weeks (around 7/31/2025).

## 2025-07-08 ENCOUNTER — PATIENT MESSAGE (OUTPATIENT)
Dept: OPTOMETRY | Facility: CLINIC | Age: 25
End: 2025-07-08
Payer: COMMERCIAL

## 2025-07-09 ENCOUNTER — OFFICE VISIT (OUTPATIENT)
Dept: OPTOMETRY | Facility: CLINIC | Age: 25
End: 2025-07-09
Payer: COMMERCIAL

## 2025-07-09 DIAGNOSIS — H52.203 MYOPIA WITH ASTIGMATISM AND PRESBYOPIA, BILATERAL: Primary | ICD-10-CM

## 2025-07-09 DIAGNOSIS — H52.13 MYOPIA WITH ASTIGMATISM AND PRESBYOPIA, BILATERAL: Primary | ICD-10-CM

## 2025-07-09 DIAGNOSIS — H52.4 MYOPIA WITH ASTIGMATISM AND PRESBYOPIA, BILATERAL: Primary | ICD-10-CM

## 2025-07-09 PROCEDURE — 99499 UNLISTED E&M SERVICE: CPT | Mod: S$GLB,,, | Performed by: OPTOMETRIST

## 2025-07-09 PROCEDURE — 99999 PR PBB SHADOW E&M-EST. PATIENT-LVL III: CPT | Mod: PBBFAC,,, | Performed by: OPTOMETRIST

## 2025-07-09 NOTE — PROGRESS NOTES
HPI    Pt  here for MRx check and states she has been wearing new glasses for 1   week now.  Pt feels that her distance vision is blurry like when she is watching   television  Last edited by Natali Garcia MA on 7/9/2025  1:44 PM.            Assessment /Plan     For exam results, see Encounter Report.    Myopia with astigmatism and presbyopia, bilateral      Pt presents today on 7/9/2025 w/issues with distance vision w/current Rx. Pt reports that reading is fine but she is having issues seeing the TV. MRx today has more minus OD. Pt likes today's MRx better when compared in phoropter.   Dr Rx change

## 2025-07-25 ENCOUNTER — OFFICE VISIT (OUTPATIENT)
Dept: DERMATOLOGY | Facility: CLINIC | Age: 25
End: 2025-07-25
Payer: COMMERCIAL

## 2025-07-25 DIAGNOSIS — L23.9 ALLERGIC DERMATITIS: Primary | ICD-10-CM

## 2025-07-25 PROCEDURE — 99999 PR PBB SHADOW E&M-EST. PATIENT-LVL III: CPT | Mod: PBBFAC,,, | Performed by: STUDENT IN AN ORGANIZED HEALTH CARE EDUCATION/TRAINING PROGRAM

## 2025-07-25 NOTE — PROGRESS NOTES
Subjective:       Patient ID:  Elvia Carr is a 24 y.o. female who presents for   Chief Complaint   Patient presents with    Folliculitis     Follow up     History of Present Illness: The patient presents for follow up of a suspected allergic dermatitis, last seen on 6/19/25, where she was having a widespread rash all over the body. She was started on a 3 weeks prednisone taper. Reports that symptoms have resolved and have not flared back up. Skin is clear with no rash or skin lesions.       Folliculitis        Review of Systems   Constitutional:  Negative for fever and chills.   Skin:  Negative for itching, rash and dry skin.        Objective:    Physical Exam   Constitutional: She appears well-developed and well-nourished. No distress.   Neurological: She is alert and oriented to person, place, and time. She is not disoriented.   Psychiatric: She has a normal mood and affect.   Skin:   Areas Examined (abnormalities noted in diagram):   Head / Face Inspection Performed  Neck Inspection Performed  RUE Inspected  LUE Inspection Performed  RLE Inspected  LLE Inspection Performed              Diagram Legend     Erythematous scaling macule/papule c/w actinic keratosis       Vascular papule c/w angioma      Pigmented verrucoid papule/plaque c/w seborrheic keratosis      Yellow umbilicated papule c/w sebaceous hyperplasia      Irregularly shaped tan macule c/w lentigo     1-2 mm smooth white papules consistent with Milia      Movable subcutaneous cyst with punctum c/w epidermal inclusion cyst      Subcutaneous movable cyst c/w pilar cyst      Firm pink to brown papule c/w dermatofibroma      Pedunculated fleshy papule(s) c/w skin tag(s)      Evenly pigmented macule c/w junctional nevus     Mildly variegated pigmented, slightly irregular-bordered macule c/w mildly atypical nevus      Flesh colored to evenly pigmented papule c/w intradermal nevus       Pink pearly papule/plaque c/w basal cell carcinoma       Erythematous hyperkeratotic cursted plaque c/w SCC      Surgical scar with no sign of skin cancer recurrence      Open and closed comedones      Inflammatory papules and pustules      Verrucoid papule consistent consistent with wart     Erythematous eczematous patches and plaques     Dystrophic onycholytic nail with subungual debris c/w onychomycosis     Umbilicated papule    Erythematous-base heme-crusted tan verrucoid plaque consistent with inflamed seborrheic keratosis     Erythematous Silvery Scaling Plaque c/w Psoriasis     See annotation      Assessment / Plan:        Allergic dermatitis - resolved s/p prednisone with no flares off medication. Monitor closely for any recurrence of symptoms.          Follow up in about 1 year (around 7/25/2026), or if symptoms worsen or fail to improve.